# Patient Record
Sex: MALE | Race: WHITE | NOT HISPANIC OR LATINO | Employment: OTHER | URBAN - METROPOLITAN AREA
[De-identification: names, ages, dates, MRNs, and addresses within clinical notes are randomized per-mention and may not be internally consistent; named-entity substitution may affect disease eponyms.]

---

## 2017-01-02 RX ORDER — SIMVASTATIN 40 MG
TABLET ORAL
Qty: 90 TABLET | Refills: 2 | Status: CANCELLED | OUTPATIENT
Start: 2017-01-02

## 2017-01-03 RX ORDER — SIMVASTATIN 40 MG
40 TABLET ORAL NIGHTLY
Qty: 90 TABLET | Refills: 0 | Status: SHIPPED | OUTPATIENT
Start: 2017-01-03 | End: 2017-03-07

## 2017-01-19 DIAGNOSIS — IMO0002 UNCONTROLLED TYPE 2 DIABETES MELLITUS WITH OTHER SPECIFIED COMPLICATION: ICD-10-CM

## 2017-01-19 DIAGNOSIS — R53.82 CHRONIC FATIGUE: ICD-10-CM

## 2017-01-19 DIAGNOSIS — E78.49 OTHER HYPERLIPIDEMIA: ICD-10-CM

## 2017-01-19 DIAGNOSIS — E11.42 DIABETIC PERIPHERAL NEUROPATHY (HCC): ICD-10-CM

## 2017-01-19 DIAGNOSIS — E55.9 VITAMIN D DEFICIENCY: ICD-10-CM

## 2017-01-19 RX ORDER — CALCIUM CITRATE/VITAMIN D3 200MG-6.25
TABLET ORAL
Qty: 100 EACH | Refills: 5 | Status: SHIPPED | OUTPATIENT
Start: 2017-01-19 | End: 2017-05-09 | Stop reason: SDUPTHER

## 2017-03-07 ENCOUNTER — OFFICE VISIT (OUTPATIENT)
Dept: ENDOCRINOLOGY | Age: 77
End: 2017-03-07

## 2017-03-07 VITALS
BODY MASS INDEX: 24.36 KG/M2 | HEIGHT: 66 IN | WEIGHT: 151.6 LBS | DIASTOLIC BLOOD PRESSURE: 74 MMHG | SYSTOLIC BLOOD PRESSURE: 134 MMHG

## 2017-03-07 DIAGNOSIS — E78.49 OTHER HYPERLIPIDEMIA: ICD-10-CM

## 2017-03-07 DIAGNOSIS — IMO0002 UNCONTROLLED TYPE 2 DIABETES MELLITUS WITH OTHER SPECIFIED COMPLICATION: ICD-10-CM

## 2017-03-07 DIAGNOSIS — E11.42 DIABETIC PERIPHERAL NEUROPATHY (HCC): ICD-10-CM

## 2017-03-07 DIAGNOSIS — M79.604 PAIN IN BOTH LOWER EXTREMITIES: ICD-10-CM

## 2017-03-07 DIAGNOSIS — IMO0001 UNCONTROLLED DIABETES MELLITUS TYPE 2 WITHOUT COMPLICATIONS, UNSPECIFIED LONG TERM INSULIN USE STATUS: Primary | ICD-10-CM

## 2017-03-07 DIAGNOSIS — R53.82 CHRONIC FATIGUE: ICD-10-CM

## 2017-03-07 DIAGNOSIS — K59.00 CONSTIPATION, UNSPECIFIED CONSTIPATION TYPE: ICD-10-CM

## 2017-03-07 DIAGNOSIS — E55.9 VITAMIN D DEFICIENCY: ICD-10-CM

## 2017-03-07 DIAGNOSIS — M79.605 PAIN IN BOTH LOWER EXTREMITIES: ICD-10-CM

## 2017-03-07 PROBLEM — M79.606 LEG PAIN: Status: ACTIVE | Noted: 2017-03-07

## 2017-03-07 PROCEDURE — 99214 OFFICE O/P EST MOD 30 MIN: CPT | Performed by: NURSE PRACTITIONER

## 2017-03-07 RX ORDER — INSULIN GLARGINE 100 [IU]/ML
INJECTION, SOLUTION SUBCUTANEOUS
Qty: 15 ML | Refills: 5 | Status: SHIPPED | OUTPATIENT
Start: 2017-03-07 | End: 2017-06-01 | Stop reason: SDUPTHER

## 2017-03-07 RX ORDER — GLUCOSAMINE HCL 500 MG
1000 TABLET ORAL DAILY
COMMUNITY

## 2017-03-07 RX ORDER — LINACLOTIDE 145 UG/1
CAPSULE, GELATIN COATED ORAL
Qty: 30 CAPSULE | Refills: 5 | Status: SHIPPED | OUTPATIENT
Start: 2017-03-07 | End: 2017-08-23

## 2017-03-07 NOTE — PATIENT INSTRUCTIONS
Take linzess 1 tab 30 min prior to food  Discontinue simvastatin to see if leg pain improves. If it does not in several weeks then restart.

## 2017-03-08 ENCOUNTER — OFFICE VISIT (OUTPATIENT)
Dept: FAMILY MEDICINE CLINIC | Facility: CLINIC | Age: 77
End: 2017-03-08

## 2017-03-08 VITALS
RESPIRATION RATE: 16 BRPM | BODY MASS INDEX: 24.27 KG/M2 | HEART RATE: 75 BPM | TEMPERATURE: 97.8 F | OXYGEN SATURATION: 97 % | HEIGHT: 66 IN | SYSTOLIC BLOOD PRESSURE: 130 MMHG | WEIGHT: 151 LBS | DIASTOLIC BLOOD PRESSURE: 70 MMHG

## 2017-03-08 DIAGNOSIS — F41.9 ANXIETY: Primary | ICD-10-CM

## 2017-03-08 LAB
25(OH)D3+25(OH)D2 SERPL-MCNC: 48.3 NG/ML (ref 30–100)
ALBUMIN SERPL-MCNC: 4.4 G/DL (ref 3.5–4.8)
ALBUMIN/GLOB SERPL: 2.2 {RATIO} (ref 1.1–2.5)
ALP SERPL-CCNC: 61 IU/L (ref 39–117)
ALT SERPL-CCNC: 6 IU/L (ref 0–44)
AST SERPL-CCNC: 12 IU/L (ref 0–40)
BILIRUB SERPL-MCNC: <0.2 MG/DL (ref 0–1.2)
BUN SERPL-MCNC: 23 MG/DL (ref 8–27)
BUN/CREAT SERPL: 20 (ref 10–22)
C PEPTIDE SERPL-MCNC: 3 NG/ML (ref 1.1–4.4)
CALCIUM SERPL-MCNC: 9.8 MG/DL (ref 8.6–10.2)
CHLORIDE SERPL-SCNC: 103 MMOL/L (ref 96–106)
CHOLEST SERPL-MCNC: 129 MG/DL (ref 100–199)
CO2 SERPL-SCNC: 23 MMOL/L (ref 18–29)
CREAT SERPL-MCNC: 1.14 MG/DL (ref 0.76–1.27)
FT4I SERPL CALC-MCNC: 2 (ref 1.2–4.9)
GLOBULIN SER CALC-MCNC: 2 G/DL (ref 1.5–4.5)
GLUCOSE SERPL-MCNC: 108 MG/DL (ref 65–99)
HBA1C MFR BLD: 8.1 % (ref 4.8–5.6)
HDLC SERPL-MCNC: 39 MG/DL
LDLC SERPL CALC-MCNC: 47 MG/DL (ref 0–99)
MICROALBUMIN UR-MCNC: <3 UG/ML
POTASSIUM SERPL-SCNC: 5 MMOL/L (ref 3.5–5.2)
PROT SERPL-MCNC: 6.4 G/DL (ref 6–8.5)
SODIUM SERPL-SCNC: 143 MMOL/L (ref 134–144)
T3FREE SERPL-MCNC: 2.3 PG/ML (ref 2–4.4)
T3RU NFR SERPL: 31 % (ref 24–39)
T4 FREE SERPL-MCNC: 1.12 NG/DL (ref 0.82–1.77)
T4 SERPL-MCNC: 6.3 UG/DL (ref 4.5–12)
TRIGL SERPL-MCNC: 213 MG/DL (ref 0–149)
TSH SERPL DL<=0.005 MIU/L-ACNC: 1.78 UIU/ML (ref 0.45–4.5)
VLDLC SERPL CALC-MCNC: 43 MG/DL (ref 5–40)

## 2017-03-08 PROCEDURE — 99212 OFFICE O/P EST SF 10 MIN: CPT | Performed by: PHYSICIAN ASSISTANT

## 2017-03-08 NOTE — PATIENT INSTRUCTIONS
Valium is a controlled substance.  I only want you to take it as needed.  I do not want you to take it routinely.  Use the lowest effective dose.  It can be habit forming.  It can make you feel drowsy.   This could effect how you operate machinery, including a car.  Caution is advised.  I would avoid taking it and then driving.  Do not take this with alcohol.      Need to consider CT chest low dose for screening

## 2017-03-08 NOTE — PROGRESS NOTES
Subjective   Courtney Medrano is a 76 y.o. male.     History of Present Illness   Courtney Medrano male 76 y.o. who presents today for follow up of Anxiety and is situational and not daily.  He reports medication is working well, patient desires to continue on Rx, and needs refill. Onset of symptoms was approximately several years ago.  He denies current suicidal and homicidal ideation. Risk factors are lifestyle of multiple roles.  Previous treatment includes current Rx.  He complains of the following medication side effects: none.  The patient declines to go to counseling..      I agree with Asia CORTEZ that he needs to stop Simvastatin for the leg cramps    Patient comes in today to see me for the first time. Is a long term patient of Dr. Marcos. Seeing me today due to Dr. Marcos no longer working here. Discussed with pt today that I will not be writing pain medications or benzodiazepines for them past a 90 day window and that we will be aggressively decreasing doses along the way. They have a choice of being sent to pain management for any narcotics taken, if they wish. I will be getting them off of any benzos.they are taking, or they can see a psychiatrist to discuss this further (list of names and phone numbers given to pt today). They are also aware that they are free to transfer their care to a different facility before the 90 days are up if they wish. After that point, they are aware that they will no longer be seen in this facility by our doctors. Again, reiterated the fact that those types of medications will not be written here past 90 days, and pt voices understanding.    The following portions of the patient's history were reviewed and updated as appropriate: allergies, current medications, past family history, past medical history, past social history, past surgical history and problem list.    Review of Systems   Constitutional: Positive for fatigue. Negative for activity change, appetite change and  unexpected weight change.   HENT: Negative for nosebleeds and trouble swallowing.    Eyes: Negative for pain and visual disturbance.   Respiratory: Negative for chest tightness, shortness of breath and wheezing.    Cardiovascular: Negative for chest pain and palpitations.   Gastrointestinal: Negative for abdominal pain and blood in stool.   Endocrine: Negative.    Genitourinary: Negative for difficulty urinating and hematuria.   Musculoskeletal: Negative for joint swelling.   Skin: Negative for color change and rash.   Allergic/Immunologic: Negative.    Neurological: Negative for syncope and speech difficulty.   Hematological: Negative for adenopathy.   Psychiatric/Behavioral: Negative for agitation and confusion. The patient is nervous/anxious.    All other systems reviewed and are negative.      Objective   Physical Exam   Constitutional: He is oriented to person, place, and time. He appears well-developed and well-nourished. No distress.   Ambulates with cane     HENT:   Head: Normocephalic.   Eyes: Conjunctivae and EOM are normal. Pupils are equal, round, and reactive to light.   Neck: Normal range of motion. Neck supple.   Cardiovascular: Normal rate, regular rhythm and normal heart sounds.    No murmur heard.  Pulmonary/Chest: Effort normal and breath sounds normal.   Musculoskeletal: Normal range of motion.   Neurological: He is alert and oriented to person, place, and time.   Skin: Skin is warm and dry. No rash noted. He is not diaphoretic.   Psychiatric: His behavior is normal. Judgment and thought content normal. His affect is not inappropriate. He is not actively hallucinating. Cognition and memory are normal.   Nursing note and vitals reviewed.      Assessment/Plan   Problems Addressed this Visit     None      Visit Diagnoses     Anxiety    -  Primary                I have reviewed the notes, assessments, and/or procedures performed by Shantell Alvarez PA-C, I concur with her/his documentation of Courtney RIVERA  Mitchell.  I have reviewed the notes, assessments, and/or procedures performed by Shantell Alvarez PA-C, I concur with her/his documentation of Courtney Medrano.

## 2017-03-09 ENCOUNTER — TELEPHONE (OUTPATIENT)
Dept: ENDOCRINOLOGY | Age: 77
End: 2017-03-09

## 2017-03-09 RX ORDER — ICOSAPENT ETHYL 1000 MG/1
2 CAPSULE ORAL 2 TIMES DAILY WITH MEALS
Qty: 120 CAPSULE | Refills: 5 | Status: SHIPPED | OUTPATIENT
Start: 2017-03-09 | End: 2017-08-28

## 2017-03-09 RX ORDER — GLIMEPIRIDE 4 MG/1
TABLET ORAL
Qty: 60 TABLET | Refills: 5 | Status: SHIPPED | OUTPATIENT
Start: 2017-03-09 | End: 2017-07-11 | Stop reason: SDUPTHER

## 2017-03-11 RX ORDER — DIAZEPAM 5 MG/1
5 TABLET ORAL DAILY
Qty: 30 TABLET | Refills: 2
Start: 2017-03-11 | End: 2017-08-23

## 2017-05-09 DIAGNOSIS — R53.82 CHRONIC FATIGUE: ICD-10-CM

## 2017-05-09 DIAGNOSIS — E11.42 DIABETIC PERIPHERAL NEUROPATHY (HCC): ICD-10-CM

## 2017-05-09 DIAGNOSIS — IMO0002 UNCONTROLLED TYPE 2 DIABETES MELLITUS WITH OTHER SPECIFIED COMPLICATION: ICD-10-CM

## 2017-05-09 DIAGNOSIS — E55.9 VITAMIN D DEFICIENCY: ICD-10-CM

## 2017-05-09 DIAGNOSIS — E78.49 OTHER HYPERLIPIDEMIA: ICD-10-CM

## 2017-05-31 DIAGNOSIS — E11.42 DIABETIC PERIPHERAL NEUROPATHY (HCC): ICD-10-CM

## 2017-05-31 DIAGNOSIS — R53.82 CHRONIC FATIGUE: ICD-10-CM

## 2017-05-31 DIAGNOSIS — IMO0002 UNCONTROLLED TYPE 2 DIABETES MELLITUS WITH OTHER SPECIFIED COMPLICATION: ICD-10-CM

## 2017-05-31 DIAGNOSIS — E78.49 OTHER HYPERLIPIDEMIA: ICD-10-CM

## 2017-05-31 DIAGNOSIS — K59.00 CONSTIPATION, UNSPECIFIED CONSTIPATION TYPE: ICD-10-CM

## 2017-05-31 DIAGNOSIS — IMO0001 UNCONTROLLED DIABETES MELLITUS TYPE 2 WITHOUT COMPLICATIONS, UNSPECIFIED LONG TERM INSULIN USE STATUS: ICD-10-CM

## 2017-05-31 DIAGNOSIS — E55.9 VITAMIN D DEFICIENCY: ICD-10-CM

## 2017-05-31 RX ORDER — INSULIN GLARGINE 100 [IU]/ML
INJECTION, SOLUTION SUBCUTANEOUS
Qty: 15 ML | Refills: 5 | Status: CANCELLED | OUTPATIENT
Start: 2017-05-31

## 2017-05-31 RX ORDER — PEN NEEDLE, DIABETIC 32GX 5/32"
NEEDLE, DISPOSABLE MISCELLANEOUS
Refills: 5 | Status: CANCELLED | OUTPATIENT
Start: 2017-05-31

## 2017-06-01 ENCOUNTER — TELEPHONE (OUTPATIENT)
Dept: ENDOCRINOLOGY | Age: 77
End: 2017-06-01

## 2017-06-01 DIAGNOSIS — IMO0001 UNCONTROLLED DIABETES MELLITUS TYPE 2 WITHOUT COMPLICATIONS, UNSPECIFIED LONG TERM INSULIN USE STATUS: ICD-10-CM

## 2017-06-01 DIAGNOSIS — R53.82 CHRONIC FATIGUE: ICD-10-CM

## 2017-06-01 DIAGNOSIS — IMO0002 UNCONTROLLED TYPE 2 DIABETES MELLITUS WITH OTHER SPECIFIED COMPLICATION: ICD-10-CM

## 2017-06-01 DIAGNOSIS — E11.42 DIABETIC PERIPHERAL NEUROPATHY (HCC): ICD-10-CM

## 2017-06-01 DIAGNOSIS — E55.9 VITAMIN D DEFICIENCY: ICD-10-CM

## 2017-06-01 DIAGNOSIS — K59.00 CONSTIPATION, UNSPECIFIED CONSTIPATION TYPE: ICD-10-CM

## 2017-06-01 DIAGNOSIS — E78.49 OTHER HYPERLIPIDEMIA: ICD-10-CM

## 2017-06-01 RX ORDER — INSULIN GLARGINE 100 [IU]/ML
INJECTION, SOLUTION SUBCUTANEOUS
Qty: 45 ML | Refills: 0 | Status: SHIPPED | OUTPATIENT
Start: 2017-06-01 | End: 2017-08-23

## 2017-06-01 NOTE — TELEPHONE ENCOUNTER
----- Message from RUSSELL Seth sent at 6/1/2017  4:33 PM EDT -----  Contact: patient   Go to er  ----- Message -----     From: Vera Babin MA     Sent: 6/1/2017   3:38 PM       To: RUSSELL Seth     Please advise.   ----- Message -----     From: Shantell Valente     Sent: 6/1/2017   3:25 PM       To: Vera Babin MA    Very fatigue  Soreness in legs the muscles in the legs  Pain is horrific   Can barely stand up    Would like a shot of something to get rid of the pain      Took zocor  For cholesterol          I spoke with patient and stated that he should go to the ER. He expressed understanding.

## 2017-06-02 ENCOUNTER — APPOINTMENT (OUTPATIENT)
Dept: CARDIOLOGY | Facility: HOSPITAL | Age: 77
End: 2017-06-02

## 2017-06-02 ENCOUNTER — HOSPITAL ENCOUNTER (EMERGENCY)
Facility: HOSPITAL | Age: 77
Discharge: LEFT BEFORE DISCHARGE | End: 2017-06-02

## 2017-06-02 VITALS
BODY MASS INDEX: 24.11 KG/M2 | RESPIRATION RATE: 15 BRPM | SYSTOLIC BLOOD PRESSURE: 132 MMHG | TEMPERATURE: 96.7 F | HEART RATE: 68 BPM | DIASTOLIC BLOOD PRESSURE: 67 MMHG | OXYGEN SATURATION: 95 % | WEIGHT: 150 LBS | HEIGHT: 66 IN

## 2017-06-02 LAB
ALBUMIN SERPL-MCNC: 4.3 G/DL (ref 3.5–5.2)
ALBUMIN/GLOB SERPL: 1.7 G/DL
ALP SERPL-CCNC: 59 U/L (ref 39–117)
ALT SERPL W P-5'-P-CCNC: 7 U/L (ref 1–41)
ANION GAP SERPL CALCULATED.3IONS-SCNC: 12.2 MMOL/L
AST SERPL-CCNC: 15 U/L (ref 1–40)
BASOPHILS # BLD AUTO: 0.04 10*3/MM3 (ref 0–0.2)
BASOPHILS NFR BLD AUTO: 0.5 % (ref 0–1.5)
BILIRUB SERPL-MCNC: 0.2 MG/DL (ref 0.1–1.2)
BUN BLD-MCNC: 28 MG/DL (ref 8–23)
BUN/CREAT SERPL: 20.7 (ref 7–25)
CALCIUM SPEC-SCNC: 11.9 MG/DL (ref 8.6–10.5)
CHLORIDE SERPL-SCNC: 103 MMOL/L (ref 98–107)
CO2 SERPL-SCNC: 25.8 MMOL/L (ref 22–29)
CREAT BLD-MCNC: 1.35 MG/DL (ref 0.76–1.27)
DEPRECATED RDW RBC AUTO: 46.4 FL (ref 37–54)
EOSINOPHIL # BLD AUTO: 0.09 10*3/MM3 (ref 0–0.7)
EOSINOPHIL NFR BLD AUTO: 1 % (ref 0.3–6.2)
ERYTHROCYTE [DISTWIDTH] IN BLOOD BY AUTOMATED COUNT: 13.7 % (ref 11.5–14.5)
GFR SERPL CREATININE-BSD FRML MDRD: 52 ML/MIN/1.73
GLOBULIN UR ELPH-MCNC: 2.6 GM/DL
GLUCOSE BLD-MCNC: 149 MG/DL (ref 65–99)
HCT VFR BLD AUTO: 37.8 % (ref 40.4–52.2)
HGB BLD-MCNC: 12.6 G/DL (ref 13.7–17.6)
HOLD SPECIMEN: NORMAL
IMM GRANULOCYTES # BLD: 0 10*3/MM3 (ref 0–0.03)
IMM GRANULOCYTES NFR BLD: 0 % (ref 0–0.5)
LYMPHOCYTES # BLD AUTO: 2.43 10*3/MM3 (ref 0.9–4.8)
LYMPHOCYTES NFR BLD AUTO: 27.6 % (ref 19.6–45.3)
MCH RBC QN AUTO: 31 PG (ref 27–32.7)
MCHC RBC AUTO-ENTMCNC: 33.3 G/DL (ref 32.6–36.4)
MCV RBC AUTO: 92.9 FL (ref 79.8–96.2)
MONOCYTES # BLD AUTO: 0.63 10*3/MM3 (ref 0.2–1.2)
MONOCYTES NFR BLD AUTO: 7.2 % (ref 5–12)
NEUTROPHILS # BLD AUTO: 5.62 10*3/MM3 (ref 1.9–8.1)
NEUTROPHILS NFR BLD AUTO: 63.7 % (ref 42.7–76)
PLATELET # BLD AUTO: 195 10*3/MM3 (ref 140–500)
PMV BLD AUTO: 10.6 FL (ref 6–12)
POTASSIUM BLD-SCNC: 4.9 MMOL/L (ref 3.5–5.2)
PROT SERPL-MCNC: 6.9 G/DL (ref 6–8.5)
RBC # BLD AUTO: 4.07 10*6/MM3 (ref 4.6–6)
SODIUM BLD-SCNC: 141 MMOL/L (ref 136–145)
WBC NRBC COR # BLD: 8.81 10*3/MM3 (ref 4.5–10.7)
WHOLE BLOOD HOLD SPECIMEN: NORMAL

## 2017-06-02 PROCEDURE — 85025 COMPLETE CBC W/AUTO DIFF WBC: CPT | Performed by: NURSE PRACTITIONER

## 2017-06-02 PROCEDURE — 80053 COMPREHEN METABOLIC PANEL: CPT | Performed by: NURSE PRACTITIONER

## 2017-06-02 PROCEDURE — 93970 EXTREMITY STUDY: CPT

## 2017-06-02 PROCEDURE — 36415 COLL VENOUS BLD VENIPUNCTURE: CPT | Performed by: NURSE PRACTITIONER

## 2017-06-02 PROCEDURE — 99211 OFF/OP EST MAY X REQ PHY/QHP: CPT

## 2017-06-02 NOTE — ED NOTES
Pt here for bilat leg pain that is worse w walking. Discomfort better once sitting. No new swelling/weakness/discoloration. Reports talking about a neuropathy dx w pcp. Was dx w dm 8 years ago.      Sanna Fraser RN  06/02/17 0275

## 2017-06-02 NOTE — PROGRESS NOTES
Vascular lab bilateral lower extremity venous doppler complete, prelim negative DVT - Ronda, APRN aware

## 2017-06-03 LAB
BH CV LOW VAS LEFT POPLITEAL SPONT: 1
BH CV LOWER VASCULAR LEFT COMMON FEMORAL AUGMENT: NORMAL
BH CV LOWER VASCULAR LEFT COMMON FEMORAL COMPETENT: NORMAL
BH CV LOWER VASCULAR LEFT COMMON FEMORAL COMPRESS: NORMAL
BH CV LOWER VASCULAR LEFT COMMON FEMORAL PHASIC: NORMAL
BH CV LOWER VASCULAR LEFT COMMON FEMORAL SPONT: NORMAL
BH CV LOWER VASCULAR LEFT DISTAL FEMORAL COMPRESS: NORMAL
BH CV LOWER VASCULAR LEFT GASTRONEMIUS COMPRESS: NORMAL
BH CV LOWER VASCULAR LEFT GREATER SAPH AK COMPRESS: NORMAL
BH CV LOWER VASCULAR LEFT GREATER SAPH BK COMPRESS: NORMAL
BH CV LOWER VASCULAR LEFT LESSER SAPH COMPRESS: NORMAL
BH CV LOWER VASCULAR LEFT MID FEMORAL AUGMENT: NORMAL
BH CV LOWER VASCULAR LEFT MID FEMORAL COMPETENT: NORMAL
BH CV LOWER VASCULAR LEFT MID FEMORAL COMPRESS: NORMAL
BH CV LOWER VASCULAR LEFT MID FEMORAL PHASIC: NORMAL
BH CV LOWER VASCULAR LEFT MID FEMORAL SPONT: NORMAL
BH CV LOWER VASCULAR LEFT PERONEAL COMPRESS: NORMAL
BH CV LOWER VASCULAR LEFT POPLITEAL AUGMENT: NORMAL
BH CV LOWER VASCULAR LEFT POPLITEAL COMPETENT: NORMAL
BH CV LOWER VASCULAR LEFT POPLITEAL COMPRESS: NORMAL
BH CV LOWER VASCULAR LEFT POPLITEAL PHASIC: NORMAL
BH CV LOWER VASCULAR LEFT POPLITEAL SPONT: NORMAL
BH CV LOWER VASCULAR LEFT POSTERIOR TIBIAL COMPRESS: NORMAL
BH CV LOWER VASCULAR LEFT PROXIMAL FEMORAL COMPRESS: NORMAL
BH CV LOWER VASCULAR LEFT SAPHENOFEMORAL JUNCTION AUGMENT: NORMAL
BH CV LOWER VASCULAR LEFT SAPHENOFEMORAL JUNCTION COMPETENT: NORMAL
BH CV LOWER VASCULAR LEFT SAPHENOFEMORAL JUNCTION COMPRESS: NORMAL
BH CV LOWER VASCULAR LEFT SAPHENOFEMORAL JUNCTION PHASIC: NORMAL
BH CV LOWER VASCULAR LEFT SAPHENOFEMORAL JUNCTION SPONT: NORMAL
BH CV LOWER VASCULAR RIGHT COMMON FEMORAL AUGMENT: NORMAL
BH CV LOWER VASCULAR RIGHT COMMON FEMORAL COMPETENT: NORMAL
BH CV LOWER VASCULAR RIGHT COMMON FEMORAL COMPRESS: NORMAL
BH CV LOWER VASCULAR RIGHT COMMON FEMORAL PHASIC: NORMAL
BH CV LOWER VASCULAR RIGHT COMMON FEMORAL SPONT: NORMAL
BH CV LOWER VASCULAR RIGHT DISTAL FEMORAL COMPRESS: NORMAL
BH CV LOWER VASCULAR RIGHT GASTRONEMIUS COMPRESS: NORMAL
BH CV LOWER VASCULAR RIGHT GREATER SAPH AK COMPRESS: NORMAL
BH CV LOWER VASCULAR RIGHT GREATER SAPH BK COMPRESS: NORMAL
BH CV LOWER VASCULAR RIGHT LESSER SAPH COMPRESS: NORMAL
BH CV LOWER VASCULAR RIGHT MID FEMORAL AUGMENT: NORMAL
BH CV LOWER VASCULAR RIGHT MID FEMORAL COMPETENT: NORMAL
BH CV LOWER VASCULAR RIGHT MID FEMORAL COMPRESS: NORMAL
BH CV LOWER VASCULAR RIGHT MID FEMORAL PHASIC: NORMAL
BH CV LOWER VASCULAR RIGHT MID FEMORAL SPONT: NORMAL
BH CV LOWER VASCULAR RIGHT PERONEAL COMPRESS: NORMAL
BH CV LOWER VASCULAR RIGHT POPLITEAL AUGMENT: NORMAL
BH CV LOWER VASCULAR RIGHT POPLITEAL COMPETENT: NORMAL
BH CV LOWER VASCULAR RIGHT POPLITEAL COMPRESS: NORMAL
BH CV LOWER VASCULAR RIGHT POPLITEAL PHASIC: NORMAL
BH CV LOWER VASCULAR RIGHT POPLITEAL SPONT: NORMAL
BH CV LOWER VASCULAR RIGHT POSTERIOR TIBIAL COMPRESS: NORMAL
BH CV LOWER VASCULAR RIGHT PROXIMAL FEMORAL COMPRESS: NORMAL
BH CV LOWER VASCULAR RIGHT SAPHENOFEMORAL JUNCTION AUGMENT: NORMAL
BH CV LOWER VASCULAR RIGHT SAPHENOFEMORAL JUNCTION COMPETENT: NORMAL
BH CV LOWER VASCULAR RIGHT SAPHENOFEMORAL JUNCTION COMPRESS: NORMAL
BH CV LOWER VASCULAR RIGHT SAPHENOFEMORAL JUNCTION PHASIC: NORMAL
BH CV LOWER VASCULAR RIGHT SAPHENOFEMORAL JUNCTION SPONT: NORMAL

## 2017-06-15 ENCOUNTER — OFFICE VISIT (OUTPATIENT)
Dept: FAMILY MEDICINE CLINIC | Facility: CLINIC | Age: 77
End: 2017-06-15

## 2017-06-15 VITALS
RESPIRATION RATE: 16 BRPM | HEART RATE: 73 BPM | HEIGHT: 66 IN | SYSTOLIC BLOOD PRESSURE: 130 MMHG | TEMPERATURE: 98.6 F | OXYGEN SATURATION: 94 % | BODY MASS INDEX: 24.27 KG/M2 | WEIGHT: 151 LBS | DIASTOLIC BLOOD PRESSURE: 64 MMHG

## 2017-06-15 DIAGNOSIS — Z86.39 HISTORY OF DIABETES MELLITUS: ICD-10-CM

## 2017-06-15 DIAGNOSIS — I73.9 INTERMITTENT CLAUDICATION (HCC): Primary | ICD-10-CM

## 2017-06-15 PROCEDURE — 99213 OFFICE O/P EST LOW 20 MIN: CPT | Performed by: PHYSICIAN ASSISTANT

## 2017-06-15 NOTE — PATIENT INSTRUCTIONS
Refer to vascular doc for work up  Stop smoking  See Dr Hilton  Peripheral Vascular Disease  Peripheral vascular disease (PVD) is a disease of the blood vessels that are not part of your heart and brain. A simple term for PVD is poor circulation. In most cases, PVD narrows the blood vessels that carry blood from your heart to the rest of your body. This can result in a decreased supply of blood to your arms, legs, and internal organs, like your stomach or kidneys. However, it most often affects a person's lower legs and feet.  There are two types of PVD.  · Organic PVD. This is the more common type. It is caused by damage to the structure of blood vessels.  · Functional PVD. This is caused by conditions that make blood vessels contract and tighten (spasm).  Without treatment, PVD tends to get worse over time.  PVD can also lead to acute ischemic limb. This is when an arm or limb suddenly has trouble getting enough blood. This is a medical emergency.  CAUSES  Each type of PVD has many different causes. The most common cause of PVD is buildup of a fatty material (plaque) inside of your arteries (atherosclerosis). Small amounts of plaque can break off from the walls of the blood vessels and become lodged in a smaller artery. This blocks blood flow and can cause acute ischemic limb.  Other common causes of PVD include:  · Blood clots that form inside of blood vessels.  · Injuries to blood vessels.  · Diseases that cause inflammation of blood vessels or cause blood vessel spasms.  · Health behaviors and health history that increase your risk of developing PVD.  RISK FACTORS   You may have a greater risk of PVD if you:  · Have a family history of PVD.  · Have certain medical conditions, including:    High cholesterol.    Diabetes.    High blood pressure (hypertension).    Coronary heart disease.    Past problems with blood clots.    Past injury, such as burns or a broken bone. These may have damaged blood vessels in  your limbs.    Buerger disease. This is caused by inflamed blood vessels in your hands and feet.    Some forms of arthritis.    Rare birth defects that affect the arteries in your legs.  · Use tobacco.  · Do not get enough exercise.  · Are obese.  · Are age 50 or older.  SIGNS AND SYMPTOMS   PVD may cause many different symptoms. Your symptoms depend on what part of your body is not getting enough blood. Some common signs and symptoms include:  · Cramps in your lower legs. This may be a symptom of poor leg circulation (claudication).  · Pain and weakness in your legs while you are physically active that goes away when you rest (intermittent claudication).  · Leg pain when at rest.  · Leg numbness, tingling, or weakness.  · Coldness in a leg or foot, especially when compared with the other leg.  · Skin or hair changes. These can include:    Hair loss.    Shiny skin.    Pale or bluish skin.    Thick toenails.  · Inability to get or maintain an erection (erectile dysfunction).  People with PVD are more prone to developing ulcers and sores on their toes, feet, or legs. These may take longer than normal to heal.  DIAGNOSIS  Your health care provider may diagnose PVD from your signs and symptoms. The health care provider will also do a physical exam. You may have tests to find out what is causing your PVD and determine its severity. Tests may include:  · Blood pressure recordings from your arms and legs and measurements of the strength of your pulses (pulse volume recordings).  · Imaging studies using sound waves to take pictures of the blood flow through your blood vessels (Doppler ultrasound).  · Injecting a dye into your blood vessels before having imaging studies using:    X-rays (angiogram or arteriogram).    Computer-generated X-rays (CT angiogram).    A powerful electromagnetic field and a computer (magnetic resonance angiogram or MRA).  TREATMENT  Treatment for PVD depends on the cause of your condition and the  severity of your symptoms. It also depends on your age. Underlying causes need to be treated and controlled. These include long-lasting (chronic) conditions, such as diabetes, high cholesterol, and high blood pressure. You may need to first try making lifestyle changes and taking medicines. Surgery may be needed if these do not work.  Lifestyle changes may include:  · Quitting smoking.  · Exercising regularly.  · Following a low-fat, low-cholesterol diet.  Medicines may include:  · Blood thinners to prevent blood clots.  · Medicines to improve blood flow.  · Medicines to improve your blood cholesterol levels.  Surgical procedures may include:  · A procedure that uses an inflated balloon to open a blocked artery and improve blood flow (angioplasty).  · A procedure to put in a tube (stent) to keep a blocked artery open (stent implant).  · Surgery to reroute blood flow around a blocked artery (peripheral bypass surgery).  · Surgery to remove dead tissue from an infected wound on the affected limb.  · Amputation. This is surgical removal of the affected limb. This may be necessary in cases of acute ischemic limb that are not improved through medical or surgical treatments.  HOME CARE INSTRUCTIONS  · Take medicines only as directed by your health care provider.  · Do not use any tobacco products, including cigarettes, chewing tobacco, or electronic cigarettes.  If you need help quitting, ask your health care provider.  · Lose weight if you are overweight, and maintain a healthy weight as directed by your health care provider.  · Eat a diet that is low in fat and cholesterol. If you need help, ask your health care provider.  · Exercise regularly. Ask your health care provider to suggest some good activities for you.  · Use compression stockings or other mechanical devices as directed by your health care provider.  · Take good care of your feet.    Wear comfortable shoes that fit well.    Check your feet often for any cuts  or sores.  SEEK MEDICAL CARE IF:  · You have cramps in your legs while walking.  · You have leg pain when you are at rest.  · You have coldness in a leg or foot.  · Your skin changes.  · You have erectile dysfunction.  · You have cuts or sores on your feet that are not healing.  SEEK IMMEDIATE MEDICAL CARE IF:  · Your arm or leg turns cold and blue.  · Your arms or legs become red, warm, swollen, painful, or numb.  · You have chest pain or trouble breathing.  · You suddenly have weakness in your face, arm, or leg.  · You become very confused or lose the ability to speak.  · You suddenly have a very bad headache or lose your vision.     This information is not intended to replace advice given to you by your health care provider. Make sure you discuss any questions you have with your health care provider.     Document Released: 01/25/2006 Document Revised: 01/08/2016 Document Reviewed: 05/28/2015  [employer] Interactive Patient Education ©2017 [employer]    Intermittent Claudication  Intermittent claudication is pain in your leg that occurs when you walk or exercise and goes away when you rest. The pain can occur in one or both legs.  CAUSES  Intermittent claudication is caused by the buildup of plaque within the major arteries in the body (atherosclerosis). The plaque, which makes arteries stiff and narrow, prevents enough blood from reaching your leg muscles. The pain occurs when you walk or exercise because your muscles need more blood when you are moving and exercising.  RISK FACTORS  Risk factors include:  · A family history of atherosclerosis.  · A personal history of stroke or heart disease.  · Older age.  · Being inactive or overweight.  · Smoking cigarettes.  · Having another health condition such as:    Diabetes.    High blood pressure.    High cholesterol.  SIGNS AND SYMPTOMS   Your hip or leg may:   · Ache.  · Cramp.  · Feel tight.  · Feel weak.  · Feel heavy.  Over time, you may feel pain in your calf,  thigh, or hip.  DIAGNOSIS   Your health care provider may diagnose intermittent claudication based on your symptoms and medical history. Your health care provider may also do tests to learn more about your condition. These may include:  · Blood tests.  · An ultrasound.  · Imaging tests such as angiography, magnetic resonance angiography (MRA), and computed tomography angiography (CTA).  TREATMENT  You may be treated for problems such as:  · High blood pressure.  · High cholesterol.  · Diabetes.  Other treatments may include:  · Lifestyle changes such as:    Starting an exercise program.    Losing weight.    Quitting smoking.  · Medicines to help restore blood flow through your legs.  · Blood vessel surgery (angioplasty) to restore blood flow if your intermittent claudication is caused by severe peripheral artery disease.  HOME CARE INSTRUCTIONS  · Manage any other health conditions you have.  · Eat a diet low in saturated fats and calories to maintain a healthy weight.  · Quit smoking, if you smoke.  · Take medicines only as directed by your health care provider.  · If your health care provider recommended an exercise program for you, follow it as directed. Your exercise program may involve:    Walking three or more times a week.    Walking until you have certain symptoms of intermittent claudication.    Resting until symptoms go away.    Gradually increasing walking time to about 50 minutes a day.  SEEK MEDICAL CARE IF:  Your condition is not getting better or is getting worse.  SEEK IMMEDIATE MEDICAL CARE IF:   · You have chest pain.  · You have difficulty breathing.  · You develop arm weakness.  · You have trouble speaking.  · Your face begins to droop.  MAKE SURE YOU:  · Understand these instructions.  · Will watch your condition.  · Will get help if you are not doing well or get worse.     This information is not intended to replace advice given to you by your health care provider. Make sure you discuss any  questions you have with your health care provider.     Document Released: 10/20/2005 Document Revised: 01/08/2016 Document Reviewed: 03/26/2015  [employer] Interactive Patient Education ©2017 [employer]    Need vascular work up soon  Stop smoking  ER if worse

## 2017-06-15 NOTE — PROGRESS NOTES
Subjective   Courtney Medrano is a 77 y.o. male.     History of Present Illness     Courtney Medrano 77 y.o. male who presents today for leg pain  he has a history of   Patient Active Problem List   Diagnosis   • Anxiety disorder   • Depression   • Constipation   • Diabetes type 2, uncontrolled   • GERD (gastroesophageal reflux disease)   • Hyperlipidemia   • Low back pain   • Prostatitis   • Pulmonary nodule   • Diabetic peripheral neuropathy   • Chronic fatigue   • Vitamin D deficiency   • Noncompliance of patient with dietary regimen   • Uncontrolled type 2 diabetes mellitus   • Constipation   • Leg pain   .    He had gone to ER 6-2-17 with leg pain and was to have doppler studies and arterial doppler and left prior to getting it done    He has DMII and sees Dr Hilton.  He has smoked for 60 years    Has to stop after walking 25 feet;  This is for a year and getting worse.  Pain is anterior and posterior tib.fib; into calf area;  It is ONLY with activity and about 25 feet; rests and then can walk another 25 feet;  Very hard to go out.  No pain waking him up in legs;  No pain with rest, sitting    He is on ASA  This is very concerning d/t his DMII on insulin and smoking 60 years.  This may already be PAD.  The following portions of the patient's history were reviewed and updated as appropriate: allergies, current medications, past family history, past medical history, past social history, past surgical history and problem list.    Review of Systems   Constitutional: Positive for fatigue. Negative for activity change, appetite change and unexpected weight change.   HENT: Negative for nosebleeds and trouble swallowing.    Eyes: Negative for pain and visual disturbance.   Respiratory: Negative for chest tightness, shortness of breath and wheezing.    Cardiovascular: Negative for chest pain and palpitations.   Gastrointestinal: Negative for abdominal pain and blood in stool.   Endocrine: Negative.    Genitourinary:  Negative for difficulty urinating and hematuria.   Musculoskeletal: Positive for arthralgias, gait problem and neck pain. Negative for joint swelling.   Skin: Negative for color change and rash.   Allergic/Immunologic: Negative.    Neurological: Negative for syncope and speech difficulty.   Hematological: Negative for adenopathy.   Psychiatric/Behavioral: Negative for agitation and confusion. The patient is nervous/anxious.    All other systems reviewed and are negative.      Objective   Physical Exam   Constitutional: He is oriented to person, place, and time. He appears well-developed and well-nourished. No distress.   HENT:   Head: Normocephalic and atraumatic.   Eyes: Conjunctivae and EOM are normal. Pupils are equal, round, and reactive to light. Right eye exhibits no discharge. Left eye exhibits no discharge. No scleral icterus.   Neck: Normal range of motion. Neck supple. No tracheal deviation present. No thyromegaly present.   Cardiovascular: Normal rate, regular rhythm, normal heart sounds, intact distal pulses and normal pulses.  Exam reveals no gallop.    No murmur heard.  I took off his socks and his lower ext are pale; no hair on left great toe and one strand right great toe;  Dorsalis pedis pulses 1+=;  Posterior tibial are 1/2-1+ bilat  Some dependent rubor medial plantar right foot;  Legs are not cold but cool to touch;  Skin is pink and vascularized;  No pain to touch  Popliteal pulses 1+=   Pulmonary/Chest: Effort normal and breath sounds normal. No respiratory distress. He has no wheezes. He has no rales.   Musculoskeletal: Normal range of motion.   Neurological: He is alert and oriented to person, place, and time. He exhibits normal muscle tone. Coordination normal.   Skin: Skin is warm. No rash noted. No erythema. No pallor.   Psychiatric: He has a normal mood and affect. His behavior is normal. Judgment and thought content normal.   Nursing note and vitals reviewed.      Assessment/Plan    Problems Addressed this Visit     None      Visit Diagnoses     Intermittent claudication    -  Primary    Relevant Orders    Ambulatory Referral to Vascular Surgery (Completed)    History of diabetes mellitus        Relevant Orders    Ambulatory Referral to Vascular Surgery (Completed)

## 2017-07-10 RX ORDER — GLIMEPIRIDE 4 MG/1
TABLET ORAL
Qty: 180 TABLET | Refills: 5 | Status: CANCELLED | OUTPATIENT
Start: 2017-07-10

## 2017-07-11 RX ORDER — GLIMEPIRIDE 4 MG/1
TABLET ORAL
Qty: 180 TABLET | Refills: 0 | Status: SHIPPED | OUTPATIENT
Start: 2017-07-11 | End: 2017-10-10 | Stop reason: SDUPTHER

## 2017-07-11 RX ORDER — SIMVASTATIN 40 MG
TABLET ORAL
Qty: 90 TABLET | Refills: 0 | OUTPATIENT
Start: 2017-07-11

## 2017-07-11 RX ORDER — SIMVASTATIN 40 MG
40 TABLET ORAL NIGHTLY
Qty: 90 TABLET | Refills: 0 | Status: SHIPPED | OUTPATIENT
Start: 2017-07-11 | End: 2017-10-10 | Stop reason: SDUPTHER

## 2017-07-13 RX ORDER — DIAZEPAM 5 MG/1
TABLET ORAL
Qty: 30 TABLET | Refills: 0 | OUTPATIENT
Start: 2017-07-13

## 2017-07-20 DIAGNOSIS — E78.49 OTHER HYPERLIPIDEMIA: ICD-10-CM

## 2017-07-20 DIAGNOSIS — E11.42 DIABETIC PERIPHERAL NEUROPATHY (HCC): ICD-10-CM

## 2017-07-20 DIAGNOSIS — E55.9 VITAMIN D DEFICIENCY: ICD-10-CM

## 2017-07-20 DIAGNOSIS — IMO0002 UNCONTROLLED TYPE 2 DIABETES MELLITUS WITH OTHER SPECIFIED COMPLICATION: ICD-10-CM

## 2017-07-20 DIAGNOSIS — R53.82 CHRONIC FATIGUE: ICD-10-CM

## 2017-07-20 RX ORDER — CALCIUM CITRATE/VITAMIN D3 200MG-6.25
TABLET ORAL
Refills: 0 | Status: CANCELLED | OUTPATIENT
Start: 2017-07-20

## 2017-07-21 DIAGNOSIS — E78.49 OTHER HYPERLIPIDEMIA: ICD-10-CM

## 2017-07-21 DIAGNOSIS — IMO0002 UNCONTROLLED TYPE 2 DIABETES MELLITUS WITH OTHER SPECIFIED COMPLICATION: ICD-10-CM

## 2017-07-21 DIAGNOSIS — R53.82 CHRONIC FATIGUE: ICD-10-CM

## 2017-07-21 DIAGNOSIS — E55.9 VITAMIN D DEFICIENCY: ICD-10-CM

## 2017-07-21 DIAGNOSIS — E11.42 DIABETIC PERIPHERAL NEUROPATHY (HCC): ICD-10-CM

## 2017-08-23 ENCOUNTER — OFFICE VISIT (OUTPATIENT)
Dept: ENDOCRINOLOGY | Age: 77
End: 2017-08-23

## 2017-08-23 VITALS
WEIGHT: 148.6 LBS | DIASTOLIC BLOOD PRESSURE: 70 MMHG | HEIGHT: 66 IN | BODY MASS INDEX: 23.88 KG/M2 | SYSTOLIC BLOOD PRESSURE: 124 MMHG

## 2017-08-23 DIAGNOSIS — E78.49 OTHER HYPERLIPIDEMIA: ICD-10-CM

## 2017-08-23 DIAGNOSIS — E55.9 VITAMIN D DEFICIENCY: ICD-10-CM

## 2017-08-23 DIAGNOSIS — Z91.119 NONCOMPLIANCE OF PATIENT WITH DIETARY REGIMEN: ICD-10-CM

## 2017-08-23 DIAGNOSIS — IMO0002 UNCONTROLLED TYPE 2 DIABETES MELLITUS WITH OTHER SPECIFIED COMPLICATION, WITH LONG-TERM CURRENT USE OF INSULIN: Primary | ICD-10-CM

## 2017-08-23 DIAGNOSIS — E11.42 DIABETIC PERIPHERAL NEUROPATHY (HCC): ICD-10-CM

## 2017-08-23 PROCEDURE — 99214 OFFICE O/P EST MOD 30 MIN: CPT | Performed by: NURSE PRACTITIONER

## 2017-08-23 RX ORDER — LISINOPRIL 10 MG/1
10 TABLET ORAL DAILY
COMMUNITY
End: 2017-08-23 | Stop reason: SDUPTHER

## 2017-08-23 RX ORDER — CILOSTAZOL 100 MG/1
100 TABLET ORAL 2 TIMES DAILY
Refills: 1 | COMMUNITY
Start: 2017-06-27 | End: 2018-07-27 | Stop reason: ALTCHOICE

## 2017-08-23 RX ORDER — LISINOPRIL 10 MG/1
10 TABLET ORAL DAILY
Qty: 90 TABLET | Refills: 1
Start: 2017-08-23 | End: 2018-02-02 | Stop reason: SDUPTHER

## 2017-08-23 RX ORDER — GABAPENTIN 100 MG/1
100 CAPSULE ORAL 3 TIMES DAILY
Qty: 90 CAPSULE | Refills: 2 | Status: SHIPPED | OUTPATIENT
Start: 2017-08-23 | End: 2017-08-23 | Stop reason: SDUPTHER

## 2017-08-23 RX ORDER — GABAPENTIN 100 MG/1
100 CAPSULE ORAL 3 TIMES DAILY
Qty: 90 CAPSULE | Refills: 2 | Status: SHIPPED | OUTPATIENT
Start: 2017-08-23 | End: 2018-02-02 | Stop reason: SDDI

## 2017-08-23 NOTE — PATIENT INSTRUCTIONS
Gabapentin 100 mg 1 pill 3 times daily for diabetic peripheral neuropathy  Lisinopril 10 mg once daily for bp and kidney protection  Labs pending

## 2017-08-23 NOTE — PROGRESS NOTES
"Subjective   Mitchpari Medrano is a 77 y.o. male is here today for follow-up.  Chief Complaint   Patient presents with   • Diabetes     no recent labs, testing BG 1 time daily, pt brought meter   • Hyperlipidemia     patient is not taking vascepa,linzess, valium   • Vitamin D Deficiency     patient has not been taking lantus either     /70  Ht 66\" (167.6 cm)  Wt 148 lb 9.6 oz (67.4 kg)  BMI 23.98 kg/m2  Current Outpatient Prescriptions on File Prior to Visit   Medication Sig   • aspirin-acetaminophen-caffeine (EXCEDRIN MIGRAINE) 250-250-65 MG per tablet Take 1 tablet by mouth every 6 (six) hours as needed for headaches.   • cholecalciferol (VITAMIN D3) 1000 UNITS tablet Take 1,000 Units by mouth Daily.   • glimepiride (AMARYL) 4 MG tablet Take 1 by mouth twice daily with meal   • glucose blood (TRUE METRIX BLOOD GLUCOSE TEST) test strip Use to test BG 1 time daily   • metFORMIN (GLUCOPHAGE) 500 MG tablet Take 2 tablets twice daily   • simvastatin (ZOCOR) 40 MG tablet Take 1 tablet by mouth Every Night.   • icosapent ethyl (VASCEPA) 1 G capsule capsule Take 2 g by mouth 2 (Two) Times a Day With Meals.   • [DISCONTINUED] diazePAM (VALIUM) 5 MG tablet Take 1 tablet by mouth Daily. Prn anxiety   • [DISCONTINUED] Insulin Pen Needle (BD PEN NEEDLE ZAY U/F) 32G X 4 MM misc Use to inject insulin one time   • [DISCONTINUED] LANTUS SOLOSTAR 100 UNIT/ML injection pen 5 units daily in am   • [DISCONTINUED] LINZESS 145 MCG capsule Take 1 daily 30 min prior to food     No current facility-administered medications on file prior to visit.      History reviewed. No pertinent family history.  Social History   Substance Use Topics   • Smoking status: Current Every Day Smoker   • Smokeless tobacco: Never Used   • Alcohol use No     Allergies   Allergen Reactions   • Flexeril [Cyclobenzaprine]          History of Present Illness   Encounter Diagnoses   Name Primary?   • Uncontrolled type 2 diabetes mellitus with other " specified complication, with long-term current use of insulin Yes   • Vitamin D deficiency    • Other hyperlipidemia    • Diabetic peripheral neuropathy      This is a 77-year-old male patient here today for a routine follow-up visit.  He is being seen for the above mentioned problems.  He is accompanied today by his wife.  He brought all his medications with him for review.  He states he recently went to the emergency room for leg pain and after spending 6 Corey he went up legal without being seen.  He stopped his statin for 3 weeks to see if the statin was causing the pain however it was not so he restarted it.  He has been diagnosed with intermittent claudication and is being followed by a specialist.  He quit taking his Lantus insulin because he was experiencing blood sugars in the 70 range which she states made him feel hypoglycemic.  He has some confusion regarding some his medications and is not sure which medications he is supposed to be taking.  He is not taking his vascepa, linzess, and Valium because he states his provider will no longer prescribe it for him.  He does have continued complaints of pain in his lower extremities that is possibly also a result of peripheral neuropathy.    The following portions of the patient's history were reviewed and updated as appropriate: allergies, current medications, past family history, past medical history, past social history, past surgical history and problem list.    Review of Systems   Constitutional: Negative for fatigue.   HENT: Negative for trouble swallowing.    Eyes: Negative for visual disturbance.   Respiratory: Negative for shortness of breath.    Cardiovascular: Negative for leg swelling.   Endocrine: Negative for polyphagia.   Skin: Negative for wound.   Neurological: Negative for numbness.       Objective   Physical Exam   Constitutional: He is oriented to person, place, and time. He appears well-developed and well-nourished. No distress.   HENT:    Head: Normocephalic and atraumatic.   Right Ear: External ear normal.   Left Ear: External ear normal.   Nose: Nose normal.   Eyes: Pupils are equal, round, and reactive to light. Right eye exhibits no discharge. Left eye exhibits no discharge.   Neck: Normal range of motion. Neck supple. Carotid bruit is not present. No tracheal deviation, no edema and no erythema present. No thyromegaly present.   Cardiovascular: Normal rate, regular rhythm, normal heart sounds and intact distal pulses.  Exam reveals no gallop and no friction rub.    No murmur heard.  Pulmonary/Chest: Effort normal and breath sounds normal. No respiratory distress. He has no wheezes. He has no rales.   Abdominal: Soft. Bowel sounds are normal. He exhibits no distension. There is no tenderness.   Musculoskeletal: He exhibits edema. He exhibits no deformity.   Uses cane. C/o pain in calf muscles   Lymphadenopathy:     He has no cervical adenopathy.   Neurological: He is alert and oriented to person, place, and time. Coordination normal.   Skin: Skin is warm and dry. No rash noted. He is not diaphoretic. No erythema. No pallor.   Psychiatric: He has a normal mood and affect. His behavior is normal. Judgment and thought content normal.   Nursing note and vitals reviewed.    Lab Results   Component Value Date    HGBA1C 8.1 (H) 03/07/2017         Assessment/Plan   Problems Addressed this Visit        Cardiovascular and Mediastinum    Hyperlipidemia       Digestive    Vitamin D deficiency       Endocrine    Diabetic peripheral neuropathy    Uncontrolled type 2 diabetes mellitus - Primary       Other    Noncompliance of patient with dietary regimen        In summary, patient was seen and examined.  Spent considerable time educating the patient regarding his medications and the mechanism of action.  He was advised that he needs to take his medications as prescribed without stopping and starting his medications on his own.  I've asked that he contact the  office with his blood sugar readings if he continues to have any problems with hypoglycemic reactions.  He was prescribed gabapentin 100 mg 1 pill 3 times daily for peripheral neuropathy.  DEEP Kincaid was reviewed at today's visit.  He was given a written prescription for the gabapentin  Gabapentin 100 mg 1 pill 3 times daily for diabetic peripheral neuropathy.  Patient was instructed in how to start off this medication taking 1 a day and titrate up to 3 times daily.  He will have extensive laboratory evaluation done at today's visit and will be notified of the results along with any further recommendations.  He is to follow-up in 4 months with labs prior.  I've encouraged him to contact the office should he have any questions or concerns prior to then  Lisinopril 10 mg once daily for bp and kidney protection  Labs pending

## 2017-08-24 LAB
25(OH)D3+25(OH)D2 SERPL-MCNC: 43.6 NG/ML (ref 30–100)
ALBUMIN SERPL-MCNC: 4 G/DL (ref 3.5–5.2)
ALBUMIN/GLOB SERPL: 1.9 G/DL
ALP SERPL-CCNC: 54 U/L (ref 39–117)
ALT SERPL-CCNC: 10 U/L (ref 1–41)
AST SERPL-CCNC: 15 U/L (ref 1–40)
BILIRUB SERPL-MCNC: <0.2 MG/DL (ref 0.1–1.2)
BUN SERPL-MCNC: 24 MG/DL (ref 8–23)
BUN/CREAT SERPL: 17.9 (ref 7–25)
C PEPTIDE SERPL-MCNC: 5 NG/ML (ref 1.1–4.4)
CALCIUM SERPL-MCNC: 9.2 MG/DL (ref 8.6–10.5)
CHLORIDE SERPL-SCNC: 105 MMOL/L (ref 98–107)
CHOLEST SERPL-MCNC: 125 MG/DL (ref 0–200)
CO2 SERPL-SCNC: 22 MMOL/L (ref 22–29)
CREAT SERPL-MCNC: 1.34 MG/DL (ref 0.76–1.27)
FT4I SERPL CALC-MCNC: 1.5 (ref 1.2–4.9)
GLOBULIN SER CALC-MCNC: 2.1 GM/DL
GLUCOSE SERPL-MCNC: 179 MG/DL (ref 65–99)
HBA1C MFR BLD: 8.4 % (ref 4.8–5.6)
HDLC SERPL-MCNC: 33 MG/DL (ref 40–60)
LDLC SERPL CALC-MCNC: 49 MG/DL (ref 0–100)
MICROALBUMIN UR-MCNC: <3 UG/ML
POTASSIUM SERPL-SCNC: 5.5 MMOL/L (ref 3.5–5.2)
PROT SERPL-MCNC: 6.1 G/DL (ref 6–8.5)
SODIUM SERPL-SCNC: 142 MMOL/L (ref 136–145)
T3FREE SERPL-MCNC: 1.7 PG/ML (ref 2–4.4)
T3RU NFR SERPL: 28 % (ref 24–39)
T4 FREE SERPL-MCNC: 1.12 NG/DL (ref 0.93–1.7)
T4 SERPL-MCNC: 5.2 UG/DL (ref 4.5–12)
TRIGL SERPL-MCNC: 215 MG/DL (ref 0–150)
TSH SERPL DL<=0.005 MIU/L-ACNC: 1.65 UIU/ML (ref 0.45–4.5)
VLDLC SERPL CALC-MCNC: 43 MG/DL (ref 5–40)

## 2017-08-28 RX ORDER — ICOSAPENT ETHYL 1000 MG/1
CAPSULE ORAL
Qty: 120 CAPSULE | Refills: 5 | Status: SHIPPED | OUTPATIENT
Start: 2017-08-28 | End: 2018-02-02

## 2017-08-28 RX ORDER — INSULIN GLARGINE 100 [IU]/ML
INJECTION, SOLUTION SUBCUTANEOUS
Qty: 15 ML | Refills: 5 | Status: SHIPPED | OUTPATIENT
Start: 2017-08-28 | End: 2018-02-02

## 2017-10-10 DIAGNOSIS — R53.82 CHRONIC FATIGUE: ICD-10-CM

## 2017-10-10 DIAGNOSIS — E11.42 DIABETIC PERIPHERAL NEUROPATHY (HCC): ICD-10-CM

## 2017-10-10 DIAGNOSIS — E55.9 VITAMIN D DEFICIENCY: ICD-10-CM

## 2017-10-10 DIAGNOSIS — E78.49 OTHER HYPERLIPIDEMIA: ICD-10-CM

## 2017-10-12 RX ORDER — CALCIUM CITRATE/VITAMIN D3 200MG-6.25
TABLET ORAL
Qty: 100 EACH | Refills: 0 | Status: SHIPPED | OUTPATIENT
Start: 2017-10-12 | End: 2018-02-06 | Stop reason: SDUPTHER

## 2017-10-12 RX ORDER — SIMVASTATIN 40 MG
TABLET ORAL
Qty: 90 TABLET | Refills: 0 | Status: SHIPPED | OUTPATIENT
Start: 2017-10-12 | End: 2018-02-02 | Stop reason: SDUPTHER

## 2017-10-12 RX ORDER — GLIMEPIRIDE 4 MG/1
TABLET ORAL
Qty: 180 TABLET | Refills: 0 | Status: SHIPPED | OUTPATIENT
Start: 2017-10-12 | End: 2018-02-02 | Stop reason: SDUPTHER

## 2018-01-17 DIAGNOSIS — IMO0002 UNCONTROLLED TYPE 2 DIABETES MELLITUS WITH OTHER SPECIFIED COMPLICATION, WITH LONG-TERM CURRENT USE OF INSULIN: Primary | ICD-10-CM

## 2018-01-17 DIAGNOSIS — E55.9 VITAMIN D DEFICIENCY: ICD-10-CM

## 2018-02-02 ENCOUNTER — OFFICE VISIT (OUTPATIENT)
Dept: ENDOCRINOLOGY | Age: 78
End: 2018-02-02

## 2018-02-02 VITALS
HEIGHT: 66 IN | SYSTOLIC BLOOD PRESSURE: 150 MMHG | RESPIRATION RATE: 16 BRPM | DIASTOLIC BLOOD PRESSURE: 86 MMHG | BODY MASS INDEX: 22.82 KG/M2 | WEIGHT: 142 LBS

## 2018-02-02 DIAGNOSIS — E11.42 DIABETIC PERIPHERAL NEUROPATHY (HCC): ICD-10-CM

## 2018-02-02 DIAGNOSIS — Z91.119 NONCOMPLIANCE OF PATIENT WITH DIETARY REGIMEN: ICD-10-CM

## 2018-02-02 DIAGNOSIS — E55.9 VITAMIN D DEFICIENCY: ICD-10-CM

## 2018-02-02 DIAGNOSIS — IMO0001 UNCONTROLLED DIABETES MELLITUS TYPE 2 WITHOUT COMPLICATIONS, UNSPECIFIED LONG TERM INSULIN USE STATUS: Primary | ICD-10-CM

## 2018-02-02 DIAGNOSIS — E78.49 OTHER HYPERLIPIDEMIA: ICD-10-CM

## 2018-02-02 PROCEDURE — 99214 OFFICE O/P EST MOD 30 MIN: CPT | Performed by: NURSE PRACTITIONER

## 2018-02-02 RX ORDER — LISINOPRIL 20 MG/1
20 TABLET ORAL DAILY
Qty: 90 TABLET | Refills: 1
Start: 2018-02-02 | End: 2018-07-27

## 2018-02-02 RX ORDER — GLIMEPIRIDE 4 MG/1
4 TABLET ORAL
Qty: 180 TABLET | Refills: 1 | Status: SHIPPED | OUTPATIENT
Start: 2018-02-02 | End: 2018-07-27 | Stop reason: SDUPTHER

## 2018-02-02 RX ORDER — SIMVASTATIN 40 MG
40 TABLET ORAL NIGHTLY
Qty: 90 TABLET | Refills: 1 | Status: SHIPPED | OUTPATIENT
Start: 2018-02-02 | End: 2019-02-01 | Stop reason: SDUPTHER

## 2018-02-02 NOTE — PROGRESS NOTES
"Subjective   Courtney Medrano is a 77 y.o. male is here today for follow-up.  Chief Complaint   Patient presents with   • Diabetes     is not taking any injections due to price; checking BG once a day; no lab review.    • Vitamin D Deficiency     Chief Complaint   Patient presents with   • Diabetes     is not taking any injections due to price; checking BG once a day; no lab review.    • Vitamin D Deficiency     /86  Resp 16  Ht 167.6 cm (66\")  Wt 64.4 kg (142 lb)  BMI 22.92 kg/m2  Current Outpatient Prescriptions on File Prior to Visit   Medication Sig   • aspirin-acetaminophen-caffeine (EXCEDRIN MIGRAINE) 250-250-65 MG per tablet Take 1 tablet by mouth every 6 (six) hours as needed for headaches.   • cholecalciferol (VITAMIN D3) 1000 UNITS tablet Take 1,000 Units by mouth Daily.   • cilostazol (PLETAL) 100 MG tablet Take 100 mg by mouth 2 (Two) Times a Day.   • glimepiride (AMARYL) 4 MG tablet TAKE 1 TABLET TWICE DAILY WITH A MEAL   • lisinopril (PRINIVIL,ZESTRIL) 10 MG tablet Take 1 tablet by mouth Daily.   • metFORMIN (GLUCOPHAGE) 500 MG tablet Take 2 tablets twice daily   • simvastatin (ZOCOR) 40 MG tablet TAKE 1 TABLET EVERY NIGHT   • TRUE METRIX BLOOD GLUCOSE TEST test strip TEST BLOOD GLUCOSE ONE TIME DAILY   • [DISCONTINUED] gabapentin (NEURONTIN) 100 MG capsule Take 1 capsule by mouth 3 (Three) Times a Day.   • [DISCONTINUED] LANTUS SOLOSTAR 100 UNIT/ML injection pen 15 units daily in am   • [DISCONTINUED] VASCEPA 1 g capsule capsule Take 2 capsules twice daily with meals     No current facility-administered medications on file prior to visit.      History reviewed. No pertinent family history.  Social History   Substance Use Topics   • Smoking status: Current Every Day Smoker   • Smokeless tobacco: Never Used   • Alcohol use No     Allergies   Allergen Reactions   • Flexeril [Cyclobenzaprine]          History of Present Illness  Encounter Diagnoses   Name Primary?   • Other hyperlipidemia Yes "   • Vitamin D deficiency    • Uncontrolled diabetes mellitus type 2 without complications, unspecified long term insulin use status    • Diabetic peripheral neuropathy      This is a 77-year-old male patient here today for routine follow-up visit.  He has not had any recent labs done.  He is being seen for the above diagnoses.  He is accompanied by his son.  He states he is tired today because.mouth at 6 AM to go the bathroom.  He is slightly hypertensive at today's visit.  He states he is taking his medications as prescribed however he states he did not get his Lantus so due to cost.  He states they wanted over $700 for that medication.  He is needing refills to his mail order pharmacy.  He has no complaints of hypoglycemic reactions.  His blood sugars were reviewed and are typically less than 1 50 mg/dL according to his meter download.  The following portions of the patient's history were reviewed and updated as appropriate: allergies, current medications, past family history, past medical history, past social history, past surgical history and problem list.    Review of Systems   Constitutional: Negative for fatigue.   Endocrine: Negative for cold intolerance and heat intolerance.   Neurological: Positive for headaches.       Objective   Physical Exam   Constitutional: He is oriented to person, place, and time. He appears well-developed and well-nourished. No distress.   HENT:   Head: Normocephalic and atraumatic.   Right Ear: External ear normal.   Left Ear: External ear normal.   Nose: Nose normal.   Eyes: Pupils are equal, round, and reactive to light. Right eye exhibits no discharge. Left eye exhibits no discharge.   Neck: Normal range of motion. Neck supple. Carotid bruit is not present. No tracheal deviation, no edema and no erythema present. No thyromegaly present.   Cardiovascular: Normal rate, regular rhythm, normal heart sounds and intact distal pulses.  Exam reveals no gallop and no friction rub.    No  murmur heard.  Pulmonary/Chest: Effort normal and breath sounds normal. No respiratory distress. He has no wheezes. He has no rales.   Abdominal: Soft. Bowel sounds are normal. He exhibits no distension. There is no tenderness.   Musculoskeletal: He exhibits edema. He exhibits no deformity.   Uses cane. C/o pain in calf muscles   Lymphadenopathy:     He has no cervical adenopathy.   Neurological: He is alert and oriented to person, place, and time. Coordination normal.   Skin: Skin is warm and dry. No rash noted. He is not diaphoretic. No erythema. No pallor.   Psychiatric: He has a normal mood and affect. His behavior is normal. Judgment and thought content normal.   Nursing note and vitals reviewed.      Results for orders placed or performed in visit on 08/23/17   Comprehensive Metabolic Panel   Result Value Ref Range    Glucose 179 (H) 65 - 99 mg/dL    BUN 24 (H) 8 - 23 mg/dL    Creatinine 1.34 (H) 0.76 - 1.27 mg/dL    eGFR Non African Am 52 (L) >60 mL/min/1.73    eGFR African Am 63 >60 mL/min/1.73    BUN/Creatinine Ratio 17.9 7.0 - 25.0    Sodium 142 136 - 145 mmol/L    Potassium 5.5 (H) 3.5 - 5.2 mmol/L    Chloride 105 98 - 107 mmol/L    Total CO2 22.0 22.0 - 29.0 mmol/L    Calcium 9.2 8.6 - 10.5 mg/dL    Total Protein 6.1 6.0 - 8.5 g/dL    Albumin 4.00 3.50 - 5.20 g/dL    Globulin 2.1 gm/dL    A/G Ratio 1.9 g/dL    Total Bilirubin <0.2 0.1 - 1.2 mg/dL    Alkaline Phosphatase 54 39 - 117 U/L    AST (SGOT) 15 1 - 40 U/L    ALT (SGPT) 10 1 - 41 U/L   C-Peptide   Result Value Ref Range    C-Peptide 5.0 (H) 1.1 - 4.4 ng/mL   Hemoglobin A1c   Result Value Ref Range    Hemoglobin A1C 8.40 (H) 4.80 - 5.60 %   Lipid Panel   Result Value Ref Range    Total Cholesterol 125 0 - 200 mg/dL    Triglycerides 215 (H) 0 - 150 mg/dL    HDL Cholesterol 33 (L) 40 - 60 mg/dL    VLDL Cholesterol 43 (H) 5 - 40 mg/dL    LDL Cholesterol  49 0 - 100 mg/dL   MicroAlbumin, Urine, Random   Result Value Ref Range    Microalbumin, Urine  <3.0 Not Estab. ug/mL   T3, Free   Result Value Ref Range    T3, Free 1.7 (L) 2.0 - 4.4 pg/mL   T4, Free   Result Value Ref Range    Free T4 1.12 0.93 - 1.70 ng/dL   Thyroid Panel With TSH   Result Value Ref Range    TSH 1.650 0.450 - 4.500 uIU/mL    T4, Total 5.2 4.5 - 12.0 ug/dL    T3 Uptake 28 24 - 39 %    Free Thyroxine Index 1.5 1.2 - 4.9   Vitamin D 25 Hydroxy   Result Value Ref Range    25 Hydroxy, Vitamin D 43.6 30.0 - 100.0 ng/mL       Assessment/Plan   Problems Addressed this Visit        Cardiovascular and Mediastinum    Hyperlipidemia - Primary       Digestive    Vitamin D deficiency       Endocrine    Diabetes type 2, uncontrolled    Diabetic peripheral neuropathy          In summary, patient was seen and examined.  His lab results from his last visit were reviewed and he was provided a copy.  He will have extensive laboratory evaluation done at today's visit will be notified of the results along with any further recommendations.  He's had no hypoglycemic events according to his meter download.  He has been cautioned on hypoglycemia risk associated with glimepiride.  He is not taking vascepa or Lantus that was prescribed for him last visit for hemoglobin A1c of 8.4.  His medications were refilled at today's visit.  Metabolically he is stable.  He's been advised to monitor his blood pressure.  He will follow-up in 4 months with labs

## 2018-02-03 LAB
25(OH)D3+25(OH)D2 SERPL-MCNC: 79 NG/ML (ref 30–100)
ALBUMIN SERPL-MCNC: 3.9 G/DL (ref 3.5–5.2)
ALBUMIN/GLOB SERPL: 1.6 G/DL
ALP SERPL-CCNC: 57 U/L (ref 39–117)
ALT SERPL-CCNC: 12 U/L (ref 1–41)
AST SERPL-CCNC: 17 U/L (ref 1–40)
BILIRUB SERPL-MCNC: 0.2 MG/DL (ref 0.1–1.2)
BUN SERPL-MCNC: 23 MG/DL (ref 8–23)
BUN/CREAT SERPL: 21.9 (ref 7–25)
C PEPTIDE SERPL-MCNC: 3.6 NG/ML (ref 1.1–4.4)
CALCIUM SERPL-MCNC: 9.8 MG/DL (ref 8.6–10.5)
CHLORIDE SERPL-SCNC: 104 MMOL/L (ref 98–107)
CHOLEST SERPL-MCNC: 177 MG/DL (ref 0–200)
CO2 SERPL-SCNC: 23.5 MMOL/L (ref 22–29)
CREAT SERPL-MCNC: 1.05 MG/DL (ref 0.76–1.27)
GFR SERPLBLD CREATININE-BSD FMLA CKD-EPI: 69 ML/MIN/1.73
GFR SERPLBLD CREATININE-BSD FMLA CKD-EPI: 83 ML/MIN/1.73
GLOBULIN SER CALC-MCNC: 2.4 GM/DL
GLUCOSE SERPL-MCNC: 174 MG/DL (ref 65–99)
HBA1C MFR BLD: 7.96 % (ref 4.8–5.6)
HDLC SERPL-MCNC: 45 MG/DL (ref 40–60)
INTERPRETATION: NORMAL
LDLC SERPL CALC-MCNC: 94 MG/DL (ref 0–100)
Lab: NORMAL
MICROALBUMIN UR-MCNC: 11 UG/ML
POTASSIUM SERPL-SCNC: 4.4 MMOL/L (ref 3.5–5.2)
PROT SERPL-MCNC: 6.3 G/DL (ref 6–8.5)
SODIUM SERPL-SCNC: 144 MMOL/L (ref 136–145)
TRIGL SERPL-MCNC: 192 MG/DL (ref 0–150)
VLDLC SERPL CALC-MCNC: 38.4 MG/DL (ref 5–40)

## 2018-02-06 DIAGNOSIS — E78.49 OTHER HYPERLIPIDEMIA: ICD-10-CM

## 2018-02-06 DIAGNOSIS — E55.9 VITAMIN D DEFICIENCY: ICD-10-CM

## 2018-02-06 DIAGNOSIS — R53.82 CHRONIC FATIGUE: ICD-10-CM

## 2018-02-06 DIAGNOSIS — E11.42 DIABETIC PERIPHERAL NEUROPATHY (HCC): ICD-10-CM

## 2018-02-17 ENCOUNTER — RESULTS ENCOUNTER (OUTPATIENT)
Dept: ENDOCRINOLOGY | Age: 78
End: 2018-02-17

## 2018-02-17 DIAGNOSIS — E55.9 VITAMIN D DEFICIENCY: ICD-10-CM

## 2018-02-17 DIAGNOSIS — IMO0002 UNCONTROLLED TYPE 2 DIABETES MELLITUS WITH OTHER SPECIFIED COMPLICATION, WITH LONG-TERM CURRENT USE OF INSULIN: ICD-10-CM

## 2018-06-22 DIAGNOSIS — H57.9 EYE EXAM ABNORMAL: Primary | ICD-10-CM

## 2018-06-22 DIAGNOSIS — IMO0001 UNCONTROLLED DIABETES MELLITUS TYPE 2 WITHOUT COMPLICATIONS, UNSPECIFIED LONG TERM INSULIN USE STATUS: ICD-10-CM

## 2018-06-22 DIAGNOSIS — H43.391 VITREOUS FLOATERS OF RIGHT EYE: ICD-10-CM

## 2018-07-27 ENCOUNTER — OFFICE VISIT (OUTPATIENT)
Dept: ENDOCRINOLOGY | Age: 78
End: 2018-07-27

## 2018-07-27 VITALS
SYSTOLIC BLOOD PRESSURE: 138 MMHG | DIASTOLIC BLOOD PRESSURE: 100 MMHG | BODY MASS INDEX: 22.34 KG/M2 | WEIGHT: 139 LBS | HEIGHT: 66 IN

## 2018-07-27 DIAGNOSIS — E78.49 OTHER HYPERLIPIDEMIA: ICD-10-CM

## 2018-07-27 DIAGNOSIS — IMO0001 UNCONTROLLED DIABETES MELLITUS TYPE 2 WITHOUT COMPLICATIONS, UNSPECIFIED LONG TERM INSULIN USE STATUS: Primary | ICD-10-CM

## 2018-07-27 DIAGNOSIS — Z91.14 NONCOMPLIANCE WITH MEDICATIONS: ICD-10-CM

## 2018-07-27 DIAGNOSIS — N41.9 PROSTATITIS, UNSPECIFIED PROSTATITIS TYPE: ICD-10-CM

## 2018-07-27 DIAGNOSIS — I10 BENIGN ESSENTIAL HTN: ICD-10-CM

## 2018-07-27 DIAGNOSIS — E55.9 VITAMIN D DEFICIENCY: ICD-10-CM

## 2018-07-27 DIAGNOSIS — Z91.119 NONCOMPLIANCE OF PATIENT WITH DIETARY REGIMEN: ICD-10-CM

## 2018-07-27 DIAGNOSIS — E11.42 DIABETIC PERIPHERAL NEUROPATHY (HCC): ICD-10-CM

## 2018-07-27 PROBLEM — Z91.148 NONCOMPLIANCE WITH MEDICATIONS: Status: ACTIVE | Noted: 2018-07-27

## 2018-07-27 PROCEDURE — 99214 OFFICE O/P EST MOD 30 MIN: CPT | Performed by: NURSE PRACTITIONER

## 2018-07-27 RX ORDER — LISINOPRIL 10 MG/1
10 TABLET ORAL DAILY
Qty: 30 TABLET | Refills: 5 | Status: SHIPPED | OUTPATIENT
Start: 2018-07-27 | End: 2019-02-01 | Stop reason: SDUPTHER

## 2018-07-27 RX ORDER — GLIMEPIRIDE 4 MG/1
8 TABLET ORAL
Qty: 180 TABLET | Refills: 1 | Status: SHIPPED | OUTPATIENT
Start: 2018-07-27 | End: 2019-02-01 | Stop reason: SDUPTHER

## 2018-07-27 NOTE — PROGRESS NOTES
"Subjective   Courtney Medrano is a 78 y.o. male is here today for follow-up.  Chief Complaint   Patient presents with   • Diabetes     no recent labs, pt tests BG 1x daily, pt did not bring meter   • Hyperlipidemia     pt no longer taking lisinopril   • Vitamin D Deficiency   • Peripheral Neuropathy     /100   Ht 167.6 cm (66\")   Wt 63 kg (139 lb)   BMI 22.44 kg/m²   Current Outpatient Prescriptions on File Prior to Visit   Medication Sig   • aspirin-acetaminophen-caffeine (EXCEDRIN MIGRAINE) 250-250-65 MG per tablet Take 1 tablet by mouth every 6 (six) hours as needed for headaches.   • cholecalciferol (VITAMIN D3) 1000 UNITS tablet Take 1,000 Units by mouth Daily.   • glimepiride (AMARYL) 4 MG tablet Take 1 tablet by mouth 2 (Two) Times a Day Before Meals.   • glucose blood (TRUE METRIX BLOOD GLUCOSE TEST) test strip Use to test up to 2x daily. DX CODE: E11.65   • metFORMIN (GLUCOPHAGE) 500 MG tablet Take 2 tablets twice daily (Patient taking differently: Take 2 tablets daily)   • Multiple Vitamins-Minerals (MULTIVITAMIN MEN 50+) tablet Take  by mouth.   • simvastatin (ZOCOR) 40 MG tablet Take 1 tablet by mouth Every Night.   • [DISCONTINUED] lisinopril (PRINIVIL,ZESTRIL) 20 MG tablet Take 1 tablet by mouth Daily.   • [DISCONTINUED] cilostazol (PLETAL) 100 MG tablet Take 100 mg by mouth 2 (Two) Times a Day.     No current facility-administered medications on file prior to visit.      No family history on file.  Social History   Substance Use Topics   • Smoking status: Current Every Day Smoker   • Smokeless tobacco: Never Used   • Alcohol use No     Allergies   Allergen Reactions   • Flexeril [Cyclobenzaprine]          History of Present Illness  Encounter Diagnoses   Name Primary?   • Other hyperlipidemia    • Vitamin D deficiency    • Uncontrolled diabetes mellitus type 2 without complications, unspecified long term insulin use status (CMS/[facility]) Yes   • Diabetic peripheral neuropathy (CMS/[facility])    • " Benign essential HTN      78-year-old male patient here today for routine follow-up visit.  He is a coming by his wife and his son.  He is being seen for the above-mentioned proms.  He has not had recent labs.  He forgot to bring his meter states he was running late to his appointment today.  He is not taking lisinopril however he doesn't recall ever taking it.  He is hypertensive at today's visit.  He is currently not on any blood pressure medication.  He states he did not get medication so that was going to cost him over $700 due to cost.  His previous medical record was reviewed and he was prescribed vascepa as well as Lantus in the past based on his triglycerides in his hemoglobin A1c.  He is due for labs at today's visit will have labs done today and will be notified of the results along with any further recommendations.  He has on occasion had a hypoglycemic events or his cut down his metformin to only 2 pills daily.  He states his blood sugar this morning he was in the 80 range.  He has had a recent fall when was out cutting grass but states his foot fell on a hole.  He does use a cane for mobility.  A foot exam was performed at today's visit.  He has no neuropathy.  The following portions of the patient's history were reviewed and updated as appropriate: allergies, current medications, past family history, past medical history, past social history, past surgical history and problem list.    Review of Systems   Constitutional: Negative for fatigue.   HENT: Negative for trouble swallowing.    Eyes: Negative for visual disturbance.   Respiratory: Negative for shortness of breath.    Cardiovascular: Negative for leg swelling.   Endocrine: Negative for polyuria.   Skin: Negative for wound.   Neurological: Negative for numbness.       Objective   Physical Exam   Constitutional: He is oriented to person, place, and time. He appears well-developed and well-nourished. No distress.   HENT:   Head: Normocephalic and  atraumatic.   Right Ear: External ear normal.   Left Ear: External ear normal.   Nose: Nose normal.   Eyes: Pupils are equal, round, and reactive to light. Right eye exhibits no discharge. Left eye exhibits no discharge.   Neck: Normal range of motion. Neck supple. Carotid bruit is not present. No tracheal deviation, no edema and no erythema present. No thyromegaly present.   Cardiovascular: Normal rate, regular rhythm, normal heart sounds and intact distal pulses.  Exam reveals no gallop and no friction rub.    No murmur heard.  Pulmonary/Chest: Effort normal and breath sounds normal. No respiratory distress. He has no wheezes. He has no rales.   Abdominal: Soft. Bowel sounds are normal. He exhibits no distension. There is no tenderness.   Musculoskeletal: He exhibits no edema or deformity.   Uses cane    Diabetic foot exam performed: exam performed.   During the foot exam he had a monofilament test performed (normal).  Vascular Status -  His right foot exhibits normal foot vasculature  and no edema. His left foot exhibits normal foot vasculature  and no edema.  Skin Integrity  -  His right foot skin is intact.His left foot skin is intact..  Lymphadenopathy:     He has no cervical adenopathy.   Neurological: He is alert and oriented to person, place, and time. Coordination normal.   Skin: Skin is warm and dry. No rash noted. He is not diaphoretic. No erythema. No pallor.   Psychiatric: He has a normal mood and affect. His behavior is normal. Judgment and thought content normal.   Nursing note and vitals reviewed.        Assessment/Plan   Problems Addressed this Visit        Cardiovascular and Mediastinum    Hyperlipidemia    Benign essential HTN       Digestive    Vitamin D deficiency       Endocrine    Diabetes type 2, uncontrolled (CMS/HCC) - Primary    Diabetic peripheral neuropathy (CMS/HCC)        In summary, patient was seen and examined.  Exam was normal.  He does have some discoloration on his left great  toe that maybe fungal.  He is hypertensive at today's visit.  Currently he is not on any blood pressure medication.  He will be started on lisinopril 10 mg once daily.  The cost was reviewed and should be affordable to patient.  Just in case it was sent to his local pharmacy [facility] for 30 day supply.  I've asked that he monitor his blood pressure as well as blood sugars and bring his meter with him to each visit so that we can adjust his medications based on blood sugars.  He will have labs done at today's visit will be notified of the results.  He was provided his lab results from his last 2 visits along with recommendations.  I've asked the family that they compile a list of his current medications why he is currently taking so that we can make sure that we have his medication records correct.  He will follow-up in 6 months with labs.

## 2018-07-27 NOTE — PATIENT INSTRUCTIONS
Labs pending  Monitor bp  Bring meter each visit  Lisinopril 10 mg once daily  Continue current meds pending labs

## 2018-07-28 LAB
25(OH)D3+25(OH)D2 SERPL-MCNC: 65 NG/ML (ref 30–100)
ALBUMIN SERPL-MCNC: 4.4 G/DL (ref 3.5–5.2)
ALBUMIN/GLOB SERPL: 2.2 G/DL
ALP SERPL-CCNC: 56 U/L (ref 39–117)
ALT SERPL-CCNC: 11 U/L (ref 1–41)
AST SERPL-CCNC: 14 U/L (ref 1–40)
BILIRUB SERPL-MCNC: 0.2 MG/DL (ref 0.1–1.2)
BUN SERPL-MCNC: 23 MG/DL (ref 8–23)
BUN/CREAT SERPL: 19 (ref 7–25)
C PEPTIDE SERPL-MCNC: 3.4 NG/ML (ref 1.1–4.4)
CALCIUM SERPL-MCNC: 10.2 MG/DL (ref 8.6–10.5)
CHLORIDE SERPL-SCNC: 104 MMOL/L (ref 98–107)
CHOLEST SERPL-MCNC: 179 MG/DL (ref 0–200)
CO2 SERPL-SCNC: 23 MMOL/L (ref 22–29)
CREAT SERPL-MCNC: 1.21 MG/DL (ref 0.76–1.27)
FT4I SERPL CALC-MCNC: 1.7 (ref 1.2–4.9)
GLOBULIN SER CALC-MCNC: 2 GM/DL
GLUCOSE SERPL-MCNC: 159 MG/DL (ref 65–99)
HBA1C MFR BLD: 7.6 % (ref 4.8–5.6)
HDLC SERPL-MCNC: 40 MG/DL (ref 40–60)
INTERPRETATION: NORMAL
LDLC SERPL CALC-MCNC: 88 MG/DL (ref 0–100)
Lab: NORMAL
POTASSIUM SERPL-SCNC: 4.9 MMOL/L (ref 3.5–5.2)
PROT SERPL-MCNC: 6.4 G/DL (ref 6–8.5)
SODIUM SERPL-SCNC: 142 MMOL/L (ref 136–145)
T3FREE SERPL-MCNC: 2 PG/ML (ref 2–4.4)
T3RU NFR SERPL: 29 % (ref 24–39)
T4 FREE SERPL-MCNC: 1.28 NG/DL (ref 0.93–1.7)
T4 SERPL-MCNC: 5.9 UG/DL (ref 4.5–12)
TRIGL SERPL-MCNC: 254 MG/DL (ref 0–150)
TSH SERPL DL<=0.005 MIU/L-ACNC: 1.17 UIU/ML (ref 0.45–4.5)
UNABLE TO VOID: NORMAL
VLDLC SERPL CALC-MCNC: 50.8 MG/DL (ref 5–40)

## 2018-09-13 DIAGNOSIS — E55.9 VITAMIN D DEFICIENCY: ICD-10-CM

## 2018-09-13 DIAGNOSIS — E78.49 OTHER HYPERLIPIDEMIA: ICD-10-CM

## 2018-09-13 DIAGNOSIS — R53.82 CHRONIC FATIGUE: ICD-10-CM

## 2018-09-13 DIAGNOSIS — E11.42 DIABETIC PERIPHERAL NEUROPATHY (HCC): ICD-10-CM

## 2018-09-14 RX ORDER — CALCIUM CITRATE/VITAMIN D3 200MG-6.25
TABLET ORAL
Qty: 100 EACH | Refills: 0 | Status: SHIPPED | OUTPATIENT
Start: 2018-09-14 | End: 2019-02-01 | Stop reason: SDUPTHER

## 2019-02-01 ENCOUNTER — OFFICE VISIT (OUTPATIENT)
Dept: ENDOCRINOLOGY | Age: 79
End: 2019-02-01

## 2019-02-01 VITALS
HEIGHT: 66 IN | BODY MASS INDEX: 22.66 KG/M2 | DIASTOLIC BLOOD PRESSURE: 80 MMHG | SYSTOLIC BLOOD PRESSURE: 132 MMHG | WEIGHT: 141 LBS

## 2019-02-01 DIAGNOSIS — R53.82 CHRONIC FATIGUE: ICD-10-CM

## 2019-02-01 DIAGNOSIS — IMO0001 UNCONTROLLED DIABETES MELLITUS TYPE 2 WITHOUT COMPLICATIONS: ICD-10-CM

## 2019-02-01 DIAGNOSIS — E11.65 UNCONTROLLED TYPE 2 DIABETES MELLITUS WITH HYPERGLYCEMIA (HCC): Primary | ICD-10-CM

## 2019-02-01 DIAGNOSIS — Z91.119 NONCOMPLIANCE OF PATIENT WITH DIETARY REGIMEN: ICD-10-CM

## 2019-02-01 DIAGNOSIS — E11.42 DIABETIC PERIPHERAL NEUROPATHY (HCC): ICD-10-CM

## 2019-02-01 DIAGNOSIS — E78.49 OTHER HYPERLIPIDEMIA: ICD-10-CM

## 2019-02-01 DIAGNOSIS — F41.9 ANXIETY: ICD-10-CM

## 2019-02-01 DIAGNOSIS — I10 BENIGN ESSENTIAL HTN: ICD-10-CM

## 2019-02-01 DIAGNOSIS — N41.9 PROSTATITIS, UNSPECIFIED PROSTATITIS TYPE: ICD-10-CM

## 2019-02-01 DIAGNOSIS — Z91.14 NONCOMPLIANCE WITH MEDICATIONS: ICD-10-CM

## 2019-02-01 DIAGNOSIS — E55.9 VITAMIN D DEFICIENCY: ICD-10-CM

## 2019-02-01 DIAGNOSIS — K61.1 PERIRECTAL ABSCESS: ICD-10-CM

## 2019-02-01 PROCEDURE — 99214 OFFICE O/P EST MOD 30 MIN: CPT | Performed by: NURSE PRACTITIONER

## 2019-02-01 RX ORDER — SIMVASTATIN 40 MG
40 TABLET ORAL NIGHTLY
Qty: 90 TABLET | Refills: 1 | Status: SHIPPED | OUTPATIENT
Start: 2019-02-01 | End: 2019-08-27 | Stop reason: SDUPTHER

## 2019-02-01 RX ORDER — LISINOPRIL 10 MG/1
10 TABLET ORAL DAILY
Qty: 90 TABLET | Refills: 1 | Status: SHIPPED | OUTPATIENT
Start: 2019-02-01 | End: 2019-08-27 | Stop reason: SDUPTHER

## 2019-02-01 RX ORDER — ESCITALOPRAM OXALATE 10 MG/1
10 TABLET ORAL DAILY
Qty: 90 TABLET | Refills: 1 | Status: SHIPPED | OUTPATIENT
Start: 2019-02-01 | End: 2019-07-15

## 2019-02-01 RX ORDER — GLIMEPIRIDE 4 MG/1
8 TABLET ORAL
Qty: 180 TABLET | Refills: 1 | Status: SHIPPED | OUTPATIENT
Start: 2019-02-01 | End: 2019-08-27 | Stop reason: SDUPTHER

## 2019-02-01 NOTE — PATIENT INSTRUCTIONS
lexapro 10 mg once daily for anxiety  Gastro referrred for perirectal abscess  Labs pending  Continue all current meds the same

## 2019-02-01 NOTE — PROGRESS NOTES
"Subjective   Mitchpari Medrano is a 78 y.o. male is here today for follow-up.  Chief Complaint   Patient presents with   • Diabetes     no recent labs, pt is not testing BG and did not bring meter   • Hyperlipidemia   • Hypertension   • Vitamin D Deficiency     /80   Ht 167.6 cm (66\")   Wt 64 kg (141 lb)   BMI 22.76 kg/m²   Current Outpatient Medications on File Prior to Visit   Medication Sig   • aspirin-acetaminophen-caffeine (EXCEDRIN MIGRAINE) 250-250-65 MG per tablet Take 1 tablet by mouth every 6 (six) hours as needed for headaches.   • cholecalciferol (VITAMIN D3) 1000 UNITS tablet Take 1,000 Units by mouth Daily.   • glimepiride (AMARYL) 4 MG tablet Take 2 tablets by mouth Every Morning Before Breakfast.   • lisinopril (PRINIVIL,ZESTRIL) 10 MG tablet Take 1 tablet by mouth Daily.   • metFORMIN (GLUCOPHAGE) 500 MG tablet Take 2 tablets twice daily (Patient taking differently: Take 2 tablets daily)   • Multiple Vitamins-Minerals (MULTIVITAMIN MEN 50+) tablet Take  by mouth.   • simvastatin (ZOCOR) 40 MG tablet Take 1 tablet by mouth Every Night.   • TRUE METRIX BLOOD GLUCOSE TEST test strip TEST BLOOD GLUCOSE ONE TIME DAILY     No current facility-administered medications on file prior to visit.      History reviewed. No pertinent family history.  Social History     Tobacco Use   • Smoking status: Current Every Day Smoker   • Smokeless tobacco: Never Used   Substance Use Topics   • Alcohol use: No   • Drug use: No     Allergies   Allergen Reactions   • Flexeril [Cyclobenzaprine]          History of Present Illness  Encounter Diagnoses   Name Primary?   • Benign essential HTN    • Other hyperlipidemia    • Perirectal abscess    • Vitamin D deficiency    • Uncontrolled type 2 diabetes mellitus with hyperglycemia (CMS/HCC) Yes   • Diabetic peripheral neuropathy (CMS/HCC)    This is a 78-year-old male patient here today for a follow-up visit.  He is a copy by his wife.  He has not had recent labs done.  " He states he is taking his medications daily and consistently.  He brought his blood glucose meter but states he's been out of test strips and has not checked his blood sugars in 3 weeks.  He denies any hypoglycemic events.  He was recently seen in the emergency room for a perirectal abscess and was prescribed Bactrim.  He is currently taking this medication however he feels like it is not completely healed.  He also has a history of colon cancer according to his wife and he has not been to follow-up with a gastroenterologist.  He will be referred from today's visit.  He is complaining of anxiety.  He is requesting a prescription for Valium which used to be prescribed by his primary care provider.  Patient was informed that a controlled substance such as Valium would not be prescribed from this office.  We did discuss other options for treatment using Lexapro for anxiety.  He does not want to follow-up his primary care regarding this issue at this time.      The following portions of the patient's history were reviewed and updated as appropriate: allergies, current medications, past family history, past medical history, past social history, past surgical history and problem list.    Review of Systems   Constitutional: Negative for fatigue.   HENT: Negative for voice change.    Eyes: Negative for visual disturbance.   Cardiovascular: Negative for leg swelling.   Endocrine: Negative for polyuria.   Skin: Negative for wound.   Neurological: Negative for numbness.       Objective   Physical Exam   Constitutional: He is oriented to person, place, and time. He appears well-developed and well-nourished. No distress.   HENT:   Head: Normocephalic and atraumatic.   Right Ear: External ear normal.   Left Ear: External ear normal.   Nose: Nose normal.   Eyes: Pupils are equal, round, and reactive to light. Right eye exhibits no discharge. Left eye exhibits no discharge.   Neck: Normal range of motion. Neck supple. Carotid  bruit is not present. No tracheal deviation, no edema and no erythema present. No thyromegaly present.   Cardiovascular: Normal rate, regular rhythm, normal heart sounds and intact distal pulses. Exam reveals no gallop and no friction rub.   No murmur heard.  Pulmonary/Chest: Effort normal and breath sounds normal. No respiratory distress. He has no wheezes. He has no rales.   Abdominal: Soft. Bowel sounds are normal. He exhibits no distension. There is no tenderness.   Musculoskeletal: He exhibits no edema or deformity.   Uses cane    Diabetic foot exam performed: exam performed.   During the foot exam he had a monofilament test not performed.  Vascular Status -  His right foot exhibits normal foot vasculature  and no edema. His left foot exhibits normal foot vasculature  and no edema.  Skin Integrity  -  His right foot skin is intact.His left foot skin is intact..  Lymphadenopathy:     He has no cervical adenopathy.   Neurological: He is alert and oriented to person, place, and time. Coordination normal.   Skin: Skin is warm and dry. No rash noted. He is not diaphoretic. No erythema. No pallor.   Psychiatric: He has a normal mood and affect. His behavior is normal. Judgment and thought content normal.   Nursing note and vitals reviewed.    Results for orders placed or performed in visit on 07/27/18   Comprehensive Metabolic Panel   Result Value Ref Range    Glucose 159 (H) 65 - 99 mg/dL    BUN 23 8 - 23 mg/dL    Creatinine 1.21 0.76 - 1.27 mg/dL    eGFR Non African Am 58 (L) >60 mL/min/1.73    eGFR African Am 71 >60 mL/min/1.73    BUN/Creatinine Ratio 19.0 7.0 - 25.0    Sodium 142 136 - 145 mmol/L    Potassium 4.9 3.5 - 5.2 mmol/L    Chloride 104 98 - 107 mmol/L    Total CO2 23.0 22.0 - 29.0 mmol/L    Calcium 10.2 8.6 - 10.5 mg/dL    Total Protein 6.4 6.0 - 8.5 g/dL    Albumin 4.40 3.50 - 5.20 g/dL    Globulin 2.0 gm/dL    A/G Ratio 2.2 g/dL    Total Bilirubin 0.2 0.1 - 1.2 mg/dL    Alkaline Phosphatase 56 39 -  117 U/L    AST (SGOT) 14 1 - 40 U/L    ALT (SGPT) 11 1 - 41 U/L   C-Peptide   Result Value Ref Range    C-Peptide 3.4 1.1 - 4.4 ng/mL   Hemoglobin A1c   Result Value Ref Range    Hemoglobin A1C 7.60 (H) 4.80 - 5.60 %   Lipid Panel   Result Value Ref Range    Total Cholesterol 179 0 - 200 mg/dL    Triglycerides 254 (H) 0 - 150 mg/dL    HDL Cholesterol 40 40 - 60 mg/dL    VLDL Cholesterol 50.8 (H) 5 - 40 mg/dL    LDL Cholesterol  88 0 - 100 mg/dL   Vitamin D 25 Hydroxy   Result Value Ref Range    25 Hydroxy, Vitamin D 65.0 30.0 - 100.0 ng/ml   T3, Free   Result Value Ref Range    T3, Free 2.0 2.0 - 4.4 pg/mL   T4, Free   Result Value Ref Range    Free T4 1.28 0.93 - 1.70 ng/dL   Thyroid Panel With TSH   Result Value Ref Range    TSH 1.170 0.450 - 4.500 uIU/mL    T4, Total 5.9 4.5 - 12.0 ug/dL    T3 Uptake 29 24 - 39 %    Free Thyroxine Index 1.7 1.2 - 4.9   Unable To Void   Result Value Ref Range    Unable to Void Comment    Cardiovascular Risk Assessment   Result Value Ref Range    Interpretation Note    Diabetes Patient Education   Result Value Ref Range    PDF Image Not applicable          Assessment/Plan   Problems Addressed this Visit        Cardiovascular and Mediastinum    Hyperlipidemia    Relevant Orders    Ambulatory Referral to Gastroenterology    Comprehensive Metabolic Panel    C-Peptide    Hemoglobin A1c    Lipid Panel    MicroAlbumin, Urine, Random - Urine, Clean Catch    Vitamin D 25 Hydroxy    T3, Free    T4, Free    Thyroid Panel With TSH    Benign essential HTN    Relevant Orders    Ambulatory Referral to Gastroenterology    Comprehensive Metabolic Panel    C-Peptide    Hemoglobin A1c    Lipid Panel    MicroAlbumin, Urine, Random - Urine, Clean Catch    Vitamin D 25 Hydroxy    T3, Free    T4, Free    Thyroid Panel With TSH       Digestive    Vitamin D deficiency    Relevant Orders    Ambulatory Referral to Gastroenterology    Comprehensive Metabolic Panel    C-Peptide    Hemoglobin A1c    Lipid  Panel    MicroAlbumin, Urine, Random - Urine, Clean Catch    Vitamin D 25 Hydroxy    T3, Free    T4, Free    Thyroid Panel With TSH       Endocrine    Diabetes type 2, uncontrolled (CMS/HCC) - Primary    Relevant Orders    Ambulatory Referral to Gastroenterology    Comprehensive Metabolic Panel    C-Peptide    Hemoglobin A1c    Lipid Panel    MicroAlbumin, Urine, Random - Urine, Clean Catch    Vitamin D 25 Hydroxy    T3, Free    T4, Free    Thyroid Panel With TSH    Diabetic peripheral neuropathy (CMS/HCC)    Relevant Orders    Ambulatory Referral to Gastroenterology    Comprehensive Metabolic Panel    C-Peptide    Hemoglobin A1c    Lipid Panel    MicroAlbumin, Urine, Random - Urine, Clean Catch    Vitamin D 25 Hydroxy    T3, Free    T4, Free    Thyroid Panel With TSH       Other    Chronic fatigue    Perirectal abscess    Relevant Orders    Ambulatory Referral to Gastroenterology    Comprehensive Metabolic Panel    C-Peptide    Hemoglobin A1c    Lipid Panel    MicroAlbumin, Urine, Random - Urine, Clean Catch    Vitamin D 25 Hydroxy    T3, Free    T4, Free    Thyroid Panel With TSH    Anxiety    Relevant Orders    Ambulatory Referral to Gastroenterology    Comprehensive Metabolic Panel    C-Peptide    Hemoglobin A1c    Lipid Panel    MicroAlbumin, Urine, Random - Urine, Clean Catch    Vitamin D 25 Hydroxy    T3, Free    T4, Free    Thyroid Panel With TSH        In summary, patient was seen and examined.  Metabolically he presents stable however he will have extensive labs at today's visit will be notified of the results along with any further recommendations.  He is having problems with anxiety so he has been prescribed Lexapro 10 mg and advised to take this medication daily as prescribed.  He is requesting refills for his prescriptions to mail order.  He has also needing test strips for his meter.  He has not been checking his blood sugars over the past several weeks.  He denies having any hypoglycemic events.  His  blood pressure is an satisfactory range.  He was recently diagnosed with a perirectal abscess and has a history of colon cancer so he has been referred to gastroenterology.  He will follow-up in our office in 6 mo.

## 2019-02-02 LAB
25(OH)D3+25(OH)D2 SERPL-MCNC: 89.6 NG/ML (ref 30–100)
ALBUMIN SERPL-MCNC: 4.3 G/DL (ref 3.5–5.2)
ALBUMIN/GLOB SERPL: 2 G/DL
ALP SERPL-CCNC: 69 U/L (ref 39–117)
ALT SERPL-CCNC: 8 U/L (ref 1–41)
AST SERPL-CCNC: 12 U/L (ref 1–40)
BILIRUB SERPL-MCNC: <0.2 MG/DL (ref 0.1–1.2)
BUN SERPL-MCNC: 25 MG/DL (ref 8–23)
BUN/CREAT SERPL: 19.1 (ref 7–25)
C PEPTIDE SERPL-MCNC: 2.9 NG/ML (ref 1.1–4.4)
CALCIUM SERPL-MCNC: 9.9 MG/DL (ref 8.6–10.5)
CHLORIDE SERPL-SCNC: 102 MMOL/L (ref 98–107)
CHOLEST SERPL-MCNC: 203 MG/DL (ref 0–200)
CO2 SERPL-SCNC: 21.5 MMOL/L (ref 22–29)
CREAT SERPL-MCNC: 1.31 MG/DL (ref 0.76–1.27)
FT4I SERPL CALC-MCNC: 1.5 (ref 1.2–4.9)
GLOBULIN SER CALC-MCNC: 2.2 GM/DL
GLUCOSE SERPL-MCNC: 128 MG/DL (ref 65–99)
HBA1C MFR BLD: 7.3 % (ref 4.8–5.6)
HDLC SERPL-MCNC: 37 MG/DL (ref 40–60)
INTERPRETATION: NORMAL
LDLC SERPL CALC-MCNC: 87 MG/DL (ref 0–100)
Lab: NORMAL
POTASSIUM SERPL-SCNC: 4.4 MMOL/L (ref 3.5–5.2)
PROT SERPL-MCNC: 6.5 G/DL (ref 6–8.5)
SODIUM SERPL-SCNC: 141 MMOL/L (ref 136–145)
T3FREE SERPL-MCNC: 1.9 PG/ML (ref 2–4.4)
T3RU NFR SERPL: 28 % (ref 24–39)
T4 FREE SERPL-MCNC: 1.11 NG/DL (ref 0.93–1.7)
T4 SERPL-MCNC: 5.5 UG/DL (ref 4.5–12)
TRIGL SERPL-MCNC: 393 MG/DL (ref 0–150)
TSH SERPL DL<=0.005 MIU/L-ACNC: 1.94 UIU/ML (ref 0.45–4.5)
UNABLE TO VOID: NORMAL
VLDLC SERPL CALC-MCNC: 78.6 MG/DL (ref 5–40)

## 2019-03-08 LAB — MICROALBUMIN UR-MCNC: 9.7 UG/ML

## 2019-07-15 ENCOUNTER — OFFICE VISIT (OUTPATIENT)
Dept: FAMILY MEDICINE CLINIC | Facility: CLINIC | Age: 79
End: 2019-07-15

## 2019-07-15 VITALS
OXYGEN SATURATION: 97 % | BODY MASS INDEX: 21.38 KG/M2 | HEART RATE: 74 BPM | WEIGHT: 133 LBS | DIASTOLIC BLOOD PRESSURE: 68 MMHG | HEIGHT: 66 IN | TEMPERATURE: 98.2 F | RESPIRATION RATE: 16 BRPM | SYSTOLIC BLOOD PRESSURE: 110 MMHG

## 2019-07-15 DIAGNOSIS — I73.9 PAD (PERIPHERAL ARTERY DISEASE) (HCC): Primary | ICD-10-CM

## 2019-07-15 DIAGNOSIS — E11.65 UNCONTROLLED TYPE 2 DIABETES MELLITUS WITH HYPERGLYCEMIA (HCC): ICD-10-CM

## 2019-07-15 DIAGNOSIS — M79.604 PAIN IN BOTH LOWER EXTREMITIES: ICD-10-CM

## 2019-07-15 DIAGNOSIS — F33.1 MODERATE EPISODE OF RECURRENT MAJOR DEPRESSIVE DISORDER (HCC): ICD-10-CM

## 2019-07-15 DIAGNOSIS — N18.30 CKD (CHRONIC KIDNEY DISEASE), STAGE III (HCC): ICD-10-CM

## 2019-07-15 DIAGNOSIS — Z86.010 HISTORY OF COLON POLYPS: ICD-10-CM

## 2019-07-15 DIAGNOSIS — M79.605 PAIN IN BOTH LOWER EXTREMITIES: ICD-10-CM

## 2019-07-15 PROCEDURE — 99214 OFFICE O/P EST MOD 30 MIN: CPT | Performed by: PHYSICIAN ASSISTANT

## 2019-07-15 RX ORDER — CHLORAL HYDRATE 500 MG
CAPSULE ORAL
COMMUNITY
End: 2020-01-11

## 2019-07-15 RX ORDER — ESCITALOPRAM OXALATE 20 MG/1
20 TABLET ORAL DAILY
Qty: 90 TABLET | Refills: 0 | Status: SHIPPED | OUTPATIENT
Start: 2019-07-15 | End: 2019-07-29 | Stop reason: SDUPTHER

## 2019-07-15 NOTE — PROGRESS NOTES
Subjective   Courtney Medrano is a 79 y.o. male.     History of Present Illness   Courtney Medrano 79 y.o. male who presents today for routine follow up check and medication refills.  he has a history of   Patient Active Problem List   Diagnosis   • Anxiety disorder   • Depression   • Constipation   • Diabetes type 2, uncontrolled (CMS/HCC)   • GERD (gastroesophageal reflux disease)   • Hyperlipidemia   • Low back pain   • Prostatitis   • Pulmonary nodule   • Diabetic peripheral neuropathy (CMS/HCC)   • Chronic fatigue   • Vitamin D deficiency   • Noncompliance of patient with dietary regimen   • Uncontrolled type 2 diabetes mellitus (CMS/HCC)   • Constipation   • Leg pain   • Benign essential HTN   • Noncompliance with medications   • Perirectal abscess   • Anxiety   .  Since the last visit, he has overall felt depressed and anxious.  He has DMII and is under the care of their Endocrinologist for medical management and here for referral to vascular doc. Wife concerned about loss of appetite and low moods.  Weight about the same since 2010.  Walks 5 minutes and has leg pain bilat and has to stop and rest; this is chronic; saw vasc 2016. Weight is down since was inpatient last month for renal stones and required surgery.  Concern with picking at food and depression.  I will still update labs and update colonoscopy screen.  Will go up on Lexapro dose and see him back in few weeks.  he has been compliant with current medications have reviewed them.  The patient denies medication side effects.  Had hx colon polyps and needs f/u with LGA;   He admits to depression.  No SI/HI    Weight similar since 2010; may be down a few pounds since was inpatient [facility] for stone---  Results for orders placed or performed in visit on 03/07/19   MicroAlbumin, Urine, Random -   Result Value Ref Range    Microalbumin, Urine 9.7 Not Estab. ug/mL   last free T3 lab was slightly low in Feb--repeat  Also update CMP and CBC    CMP done  inpatient LFT in range;   A1C 6-2-19 was 8.2--see endocrine for DMII, HTN, hyperlipidemia  The following portions of the patient's history were reviewed and updated as appropriate: allergies, current medications, past family history, past medical history, past social history, past surgical history and problem list.    Review of Systems   Constitutional: Positive for appetite change, diaphoresis, fatigue and unexpected weight change. Negative for activity change.   HENT: Positive for dental problem, tinnitus and voice change. Negative for nosebleeds and trouble swallowing.    Eyes: Positive for discharge. Negative for pain and visual disturbance.   Respiratory: Negative for chest tightness, shortness of breath and wheezing.    Cardiovascular: Positive for leg swelling. Negative for chest pain and palpitations.   Gastrointestinal: Positive for constipation. Negative for abdominal pain and blood in stool.   Endocrine: Negative.    Genitourinary: Positive for difficulty urinating, dysuria, hematuria and urgency.   Musculoskeletal: Positive for arthralgias, neck pain and neck stiffness. Negative for joint swelling.   Skin: Positive for pallor. Negative for color change and rash.   Allergic/Immunologic: Negative.    Neurological: Positive for speech difficulty, weakness and headaches. Negative for syncope.   Hematological: Negative for adenopathy.   Psychiatric/Behavioral: Positive for agitation, decreased concentration and dysphoric mood. Negative for confusion. The patient is nervous/anxious.    All other systems reviewed and are negative.      Objective   Physical Exam   Constitutional: He is oriented to person, place, and time. He appears well-developed and well-nourished. No distress.   HENT:   Head: Normocephalic and atraumatic.   Eyes: Conjunctivae and EOM are normal. Pupils are equal, round, and reactive to light. Right eye exhibits no discharge. Left eye exhibits no discharge. No scleral icterus.   Neck: Normal  range of motion. Neck supple. No tracheal deviation present. No thyromegaly present.   Cardiovascular: Normal rate, regular rhythm, normal heart sounds and normal pulses. Exam reveals no gallop.   No murmur heard.  Difficult to palp pulses lower ext:  Dorsalis pedis and posterior tibia 1/2/4----hard to feel; feet are warm, not hot or cool; no hair on toes   Pulmonary/Chest: Effort normal and breath sounds normal. No respiratory distress. He has no wheezes. He has no rales.   Musculoskeletal: Normal range of motion.   Neurological: He is alert and oriented to person, place, and time. He exhibits normal muscle tone. Coordination normal.   Skin: Skin is warm. No rash noted. No erythema. No pallor.   Psychiatric: His behavior is normal.   Nursing note and vitals reviewed.      Assessment/Plan   Courtney was seen today for surgery follow up.    Diagnoses and all orders for this visit:    Uncontrolled type 2 diabetes mellitus with hyperglycemia (CMS/HCC)  -     Comprehensive Metabolic Panel  -     CBC & Differential  -     T3, Free  -     T4, Free  -     TSH    PAD (peripheral artery disease) (CMS/HCC)  -     Comprehensive Metabolic Panel  -     CBC & Differential  -     T3, Free  -     T4, Free  -     TSH  -     Ambulatory Referral to Vascular Surgery    Moderate episode of recurrent major depressive disorder (CMS/HCC)  -     Comprehensive Metabolic Panel  -     CBC & Differential  -     T3, Free  -     T4, Free  -     TSH    History of colon polyps  -     Ambulatory Referral to Gastroenterology    Other orders  -     escitalopram (LEXAPRO) 20 MG tablet; Take 1 tablet by mouth Daily. For depression; new dose    go up on Lexapro for depression and anxiety;  Also get thyroid labs, CBC, CMP today--watching weight --eating less; recent surgery--renal stones; see me in few weeks f/u on this also and going up on Lexapro  Needs colonoscopy d/t hx colon polyps--LGA  Refer to vascular doc for PAD and has pain  F/u endocrine for  DMII

## 2019-07-17 LAB
ALBUMIN SERPL-MCNC: 4.4 G/DL (ref 3.5–5.2)
ALBUMIN/GLOB SERPL: 3.1 G/DL
ALP SERPL-CCNC: 63 U/L (ref 39–117)
ALT SERPL-CCNC: 7 U/L (ref 1–41)
AST SERPL-CCNC: 10 U/L (ref 1–40)
BASOPHILS # BLD AUTO: 0.06 10*3/MM3 (ref 0–0.2)
BASOPHILS NFR BLD AUTO: 0.9 % (ref 0–1.5)
BILIRUB SERPL-MCNC: 0.3 MG/DL (ref 0.2–1.2)
BUN SERPL-MCNC: 25 MG/DL (ref 8–23)
BUN/CREAT SERPL: 17.6 (ref 7–25)
CALCIUM SERPL-MCNC: 9.2 MG/DL (ref 8.6–10.5)
CHLORIDE SERPL-SCNC: 103 MMOL/L (ref 98–107)
CO2 SERPL-SCNC: 23.8 MMOL/L (ref 22–29)
CREAT SERPL-MCNC: 1.42 MG/DL (ref 0.76–1.27)
EOSINOPHIL # BLD AUTO: 0.19 10*3/MM3 (ref 0–0.4)
EOSINOPHIL NFR BLD AUTO: 2.8 % (ref 0.3–6.2)
ERYTHROCYTE [DISTWIDTH] IN BLOOD BY AUTOMATED COUNT: 14.4 % (ref 12.3–15.4)
GLOBULIN SER CALC-MCNC: 1.4 GM/DL
GLUCOSE SERPL-MCNC: 141 MG/DL (ref 65–99)
HCT VFR BLD AUTO: 36.4 % (ref 37.5–51)
HGB BLD-MCNC: 11.2 G/DL (ref 13–17.7)
IMM GRANULOCYTES # BLD AUTO: 0.01 10*3/MM3 (ref 0–0.05)
IMM GRANULOCYTES NFR BLD AUTO: 0.1 % (ref 0–0.5)
LYMPHOCYTES # BLD AUTO: 2.04 10*3/MM3 (ref 0.7–3.1)
LYMPHOCYTES NFR BLD AUTO: 30.2 % (ref 19.6–45.3)
MCH RBC QN AUTO: 30.2 PG (ref 26.6–33)
MCHC RBC AUTO-ENTMCNC: 30.8 G/DL (ref 31.5–35.7)
MCV RBC AUTO: 98.1 FL (ref 79–97)
MONOCYTES # BLD AUTO: 0.51 10*3/MM3 (ref 0.1–0.9)
MONOCYTES NFR BLD AUTO: 7.5 % (ref 5–12)
NEUTROPHILS # BLD AUTO: 3.95 10*3/MM3 (ref 1.7–7)
NEUTROPHILS NFR BLD AUTO: 58.5 % (ref 42.7–76)
NRBC BLD AUTO-RTO: 0 /100 WBC (ref 0–0.2)
PLATELET # BLD AUTO: 220 10*3/MM3 (ref 140–450)
POTASSIUM SERPL-SCNC: 4.6 MMOL/L (ref 3.5–5.2)
PROT SERPL-MCNC: 5.8 G/DL (ref 6–8.5)
RBC # BLD AUTO: 3.71 10*6/MM3 (ref 4.14–5.8)
SODIUM SERPL-SCNC: 141 MMOL/L (ref 136–145)
T3FREE SERPL-MCNC: 2.3 PG/ML (ref 2–4.4)
T4 FREE SERPL-MCNC: 1.21 NG/DL (ref 0.93–1.7)
TSH SERPL DL<=0.005 MIU/L-ACNC: 1.5 MIU/ML (ref 0.27–4.2)
WBC # BLD AUTO: 6.76 10*3/MM3 (ref 3.4–10.8)

## 2019-07-19 LAB
FOLATE SERPL-MCNC: 6.3 NG/ML (ref 4.78–24.2)
IRON SATN MFR SERPL: 14 % (ref 20–50)
IRON SERPL-MCNC: 65 MCG/DL (ref 59–158)
Lab: NORMAL
TIBC SERPL-MCNC: 448 MCG/DL
UIBC SERPL-MCNC: 383 MCG/DL (ref 112–346)
VIT B12 SERPL-MCNC: 695 PG/ML (ref 211–946)
WRITTEN AUTHORIZATION: NORMAL

## 2019-07-29 ENCOUNTER — OFFICE VISIT (OUTPATIENT)
Dept: FAMILY MEDICINE CLINIC | Facility: CLINIC | Age: 79
End: 2019-07-29

## 2019-07-29 VITALS
TEMPERATURE: 98.5 F | HEIGHT: 66 IN | SYSTOLIC BLOOD PRESSURE: 128 MMHG | DIASTOLIC BLOOD PRESSURE: 78 MMHG | OXYGEN SATURATION: 98 % | RESPIRATION RATE: 16 BRPM | BODY MASS INDEX: 21.69 KG/M2 | WEIGHT: 135 LBS | HEART RATE: 68 BPM

## 2019-07-29 DIAGNOSIS — I73.9 PAD (PERIPHERAL ARTERY DISEASE) (HCC): ICD-10-CM

## 2019-07-29 DIAGNOSIS — F41.1 GENERALIZED ANXIETY DISORDER: ICD-10-CM

## 2019-07-29 DIAGNOSIS — E11.65 UNCONTROLLED TYPE 2 DIABETES MELLITUS WITH HYPERGLYCEMIA (HCC): ICD-10-CM

## 2019-07-29 DIAGNOSIS — F33.0 MILD EPISODE OF RECURRENT MAJOR DEPRESSIVE DISORDER (HCC): Primary | ICD-10-CM

## 2019-07-29 DIAGNOSIS — D63.8 ANEMIA OF CHRONIC DISEASE: ICD-10-CM

## 2019-07-29 DIAGNOSIS — E78.49 OTHER HYPERLIPIDEMIA: ICD-10-CM

## 2019-07-29 DIAGNOSIS — D64.9 LOW HEMOGLOBIN: ICD-10-CM

## 2019-07-29 DIAGNOSIS — K21.00 GASTROESOPHAGEAL REFLUX DISEASE WITH ESOPHAGITIS: ICD-10-CM

## 2019-07-29 DIAGNOSIS — K63.5 POLYP OF COLON, UNSPECIFIED PART OF COLON, UNSPECIFIED TYPE: ICD-10-CM

## 2019-07-29 DIAGNOSIS — N18.30 CKD (CHRONIC KIDNEY DISEASE) STAGE 3, GFR 30-59 ML/MIN (HCC): ICD-10-CM

## 2019-07-29 PROCEDURE — 99214 OFFICE O/P EST MOD 30 MIN: CPT | Performed by: PHYSICIAN ASSISTANT

## 2019-07-29 RX ORDER — ESCITALOPRAM OXALATE 20 MG/1
20 TABLET ORAL DAILY
Qty: 90 TABLET | Refills: 1 | Status: SHIPPED | OUTPATIENT
Start: 2019-07-29 | End: 2019-10-22 | Stop reason: SDUPTHER

## 2019-07-29 NOTE — PROGRESS NOTES
Subjective   Courtney Medrano is a 79 y.o. male.     History of Present Illness   Courtney Medrano male 79 y.o. who presents today for follow up of Depression and Anxiety.  He reports some help with going up on the Lexapro with depression.  Wife here and can tell it is helping.  He wants to stay on this and not change yet---some help.. Onset of symptoms was approximately several years ago.  He denies current suicidal and homicidal ideation. Risk factors are family history of anxiety and or depression, chronic illness and chronic pain.  Previous treatment includes current Rx.  He complains of the following medication side effects: none.  The patient declines to go to counseling..    I did do labs and d/t renal functions---referred to nephrologist----this was due to   Lab Results   Component Value Date    GLUCOSE 149 (H) 06/02/2017    BUN 25 (H) 07/16/2019    CREATININE 1.42 (H) 07/16/2019    EGFRIFNONA 48 (L) 07/16/2019    EGFRIFAFRI 59 (L) 07/16/2019    BCR 17.6 07/16/2019    K 4.6 07/16/2019    CO2 23.8 07/16/2019    CALCIUM 9.2 07/16/2019    PROTENTOTREF 5.8 (L) 07/16/2019    ALBUMIN 4.40 07/16/2019    LABIL2 3.1 07/16/2019    AST 10 07/16/2019    ALT 7 07/16/2019      Lab Results   Component Value Date    WBC 6.76 07/16/2019    HGB 11.2 (L) 07/16/2019    HCT 36.4 (L) 07/16/2019    MCV 98.1 (H) 07/16/2019     07/16/2019     Then I added iron profile--serum iron was normal but sat lower range TIBC upper range---concern about mild iron def but also anemia of chronic disease----with DMII---see nephrologist-----I do want him to see DR Babin---suspect his eating changed but I still want to consider EGD with the needed colonoscopy  DR Babin; do have him on MVI for iron stores and lower range folic acid.; hgb 14.2 in June; I will get CBC today  Still want him to see vascular doc for re eval PAD.  Sees endocrine for the DMII and lipids  He does have GERD and takes antiacids--no help Prilosec----another reason to see  GI--DR Babin  Weight is up a little    The following portions of the patient's history were reviewed and updated as appropriate: allergies, current medications, past family history, past medical history, past social history, past surgical history and problem list.    Review of Systems   Constitutional: Negative for activity change, appetite change and unexpected weight change.   HENT: Positive for tinnitus. Negative for nosebleeds and trouble swallowing.    Eyes: Negative for pain and visual disturbance.   Respiratory: Negative for chest tightness, shortness of breath and wheezing.    Cardiovascular: Negative for chest pain and palpitations.   Gastrointestinal: Negative for abdominal pain and blood in stool.   Endocrine: Negative.    Genitourinary: Positive for difficulty urinating, dysuria and enuresis. Negative for hematuria.   Musculoskeletal: Negative for joint swelling.   Skin: Negative for color change and rash.   Allergic/Immunologic: Negative.    Neurological: Positive for headaches. Negative for syncope and speech difficulty.   Hematological: Negative for adenopathy.   Psychiatric/Behavioral: Negative for agitation and confusion.   All other systems reviewed and are negative.      Objective   Physical Exam   Constitutional: He is oriented to person, place, and time. He appears well-developed and well-nourished. No distress.   HENT:   Head: Normocephalic and atraumatic.   Eyes: Conjunctivae and EOM are normal. Pupils are equal, round, and reactive to light. Right eye exhibits no discharge. Left eye exhibits no discharge. No scleral icterus.   Neck: Normal range of motion. Neck supple. No tracheal deviation present. No thyromegaly present.   Cardiovascular: Normal rate, regular rhythm, normal heart sounds, intact distal pulses and normal pulses. Exam reveals no gallop.   No murmur heard.  Pulmonary/Chest: Effort normal and breath sounds normal. No respiratory distress. He has no wheezes. He has no rales.    Musculoskeletal: Normal range of motion.   Neurological: He is alert and oriented to person, place, and time. He exhibits normal muscle tone. Coordination normal.   Skin: Skin is warm. No rash noted. No erythema. No pallor.   Psychiatric: He has a normal mood and affect. His behavior is normal. Judgment and thought content normal.   Nursing note and vitals reviewed.      Assessment/Plan   Courtney was seen today for follow-up.    Diagnoses and all orders for this visit:    Mild episode of recurrent major depressive disorder (CMS/[facility])    Generalized anxiety disorder    Other hyperlipidemia    Gastroesophageal reflux disease with esophagitis  -     Ambulatory Referral to Gastroenterology    Low hemoglobin  -     Ambulatory Referral to Gastroenterology  -     CBC & Differential  -     Basic Metabolic Panel  -     Ferritin    Anemia of chronic disease  -     Ambulatory Referral to Nephrology  -     Ambulatory Referral to Gastroenterology  -     CBC & Differential  -     Basic Metabolic Panel  -     Ferritin    CKD (chronic kidney disease) stage 3, GFR 30-59 ml/min (CMS/HCC)  -     Ambulatory Referral to Nephrology  -     CBC & Differential  -     Basic Metabolic Panel  -     Ferritin    Uncontrolled type 2 diabetes mellitus with hyperglycemia ([facility])  -     Ambulatory Referral to Nephrology    Polyp of colon, unspecified part of colon, unspecified type  -     Ambulatory Referral to Gastroenterology    PAD (peripheral artery disease) (CMS/HCC)    Other orders  -     escitalopram (LEXAPRO) 20 MG tablet; Take 1 tablet by mouth Daily. For depression;        I did do labs and d/t renal functions---referred to nephrologist----this was due to Then I added iron profile--serum iron was normal but sat lower range TIBC upper range---concern about mild iron def but also anemia of chronic disease----with DMII---see nephrologist-----I do want him to see DR Babin---suspect his eating changed but I still want to consider EGD with the  needed colonoscopy  DR Babin; do have him on MVI for iron stores and lower range folic acid.; hgb 14.2 in June; I will get CBC today  Still want him to see vascular doc for re eval PAD.  Sees endocrine for the DMII and lipids  He does have GERD and takes antiacids--no help Prilosec----another reason to see GI--DR Babin  Weight is up a little=---better

## 2019-07-30 LAB
BASOPHILS # BLD AUTO: 0.04 10*3/MM3 (ref 0–0.2)
BASOPHILS NFR BLD AUTO: 0.6 % (ref 0–1.5)
BUN SERPL-MCNC: 25 MG/DL (ref 8–23)
BUN/CREAT SERPL: 16.8 (ref 7–25)
CALCIUM SERPL-MCNC: 9.7 MG/DL (ref 8.6–10.5)
CHLORIDE SERPL-SCNC: 103 MMOL/L (ref 98–107)
CO2 SERPL-SCNC: 23.8 MMOL/L (ref 22–29)
CREAT SERPL-MCNC: 1.49 MG/DL (ref 0.76–1.27)
EOSINOPHIL # BLD AUTO: 0.11 10*3/MM3 (ref 0–0.4)
EOSINOPHIL NFR BLD AUTO: 1.7 % (ref 0.3–6.2)
ERYTHROCYTE [DISTWIDTH] IN BLOOD BY AUTOMATED COUNT: 14.5 % (ref 12.3–15.4)
FERRITIN SERPL-MCNC: 20.2 NG/ML (ref 30–400)
GLUCOSE SERPL-MCNC: 133 MG/DL (ref 65–99)
HCT VFR BLD AUTO: 39 % (ref 37.5–51)
HGB BLD-MCNC: 11.9 G/DL (ref 13–17.7)
IMM GRANULOCYTES # BLD AUTO: 0.02 10*3/MM3 (ref 0–0.05)
IMM GRANULOCYTES NFR BLD AUTO: 0.3 % (ref 0–0.5)
LYMPHOCYTES # BLD AUTO: 2.18 10*3/MM3 (ref 0.7–3.1)
LYMPHOCYTES NFR BLD AUTO: 33 % (ref 19.6–45.3)
MCH RBC QN AUTO: 30.2 PG (ref 26.6–33)
MCHC RBC AUTO-ENTMCNC: 30.5 G/DL (ref 31.5–35.7)
MCV RBC AUTO: 99 FL (ref 79–97)
MONOCYTES # BLD AUTO: 0.48 10*3/MM3 (ref 0.1–0.9)
MONOCYTES NFR BLD AUTO: 7.3 % (ref 5–12)
NEUTROPHILS # BLD AUTO: 3.77 10*3/MM3 (ref 1.7–7)
NEUTROPHILS NFR BLD AUTO: 57.1 % (ref 42.7–76)
NRBC BLD AUTO-RTO: 0 /100 WBC (ref 0–0.2)
PLATELET # BLD AUTO: 239 10*3/MM3 (ref 140–450)
POTASSIUM SERPL-SCNC: 5.1 MMOL/L (ref 3.5–5.2)
RBC # BLD AUTO: 3.94 10*6/MM3 (ref 4.14–5.8)
SODIUM SERPL-SCNC: 141 MMOL/L (ref 136–145)
WBC # BLD AUTO: 6.6 10*3/MM3 (ref 3.4–10.8)

## 2019-07-30 RX ORDER — FERROUS SULFATE 325(65) MG
TABLET ORAL
Qty: 90 TABLET | Refills: 0 | OUTPATIENT
Start: 2019-07-30 | End: 2020-01-11

## 2019-07-30 NOTE — PROGRESS NOTES
"Spoke to pt and advised he states he is taking Excedrin daily and \"will try to stop\" but he said his is unsure if he can.    He is picking up FOBT and lab results and knows Dr. Babin's office will be calling"

## 2019-08-15 DIAGNOSIS — Z91.119 NONCOMPLIANCE OF PATIENT WITH DIETARY REGIMEN: ICD-10-CM

## 2019-08-15 DIAGNOSIS — IMO0001 UNCONTROLLED DIABETES MELLITUS TYPE 2 WITHOUT COMPLICATIONS: ICD-10-CM

## 2019-08-15 DIAGNOSIS — E78.49 OTHER HYPERLIPIDEMIA: ICD-10-CM

## 2019-08-15 DIAGNOSIS — E55.9 VITAMIN D DEFICIENCY: ICD-10-CM

## 2019-08-15 DIAGNOSIS — E11.42 DIABETIC PERIPHERAL NEUROPATHY (HCC): ICD-10-CM

## 2019-08-15 DIAGNOSIS — Z91.14 NONCOMPLIANCE WITH MEDICATIONS: ICD-10-CM

## 2019-08-15 DIAGNOSIS — N41.9 PROSTATITIS, UNSPECIFIED PROSTATITIS TYPE: ICD-10-CM

## 2019-08-15 DIAGNOSIS — I10 BENIGN ESSENTIAL HTN: ICD-10-CM

## 2019-08-15 RX ORDER — GLIMEPIRIDE 4 MG/1
8 TABLET ORAL
Qty: 180 TABLET | Refills: 1 | OUTPATIENT
Start: 2019-08-15

## 2019-08-15 RX ORDER — SIMVASTATIN 40 MG
TABLET ORAL
Qty: 90 TABLET | Refills: 1 | OUTPATIENT
Start: 2019-08-15

## 2019-08-15 RX ORDER — LISINOPRIL 10 MG/1
TABLET ORAL
Qty: 90 TABLET | Refills: 1 | OUTPATIENT
Start: 2019-08-15

## 2019-08-27 DIAGNOSIS — IMO0001 UNCONTROLLED DIABETES MELLITUS TYPE 2 WITHOUT COMPLICATIONS: ICD-10-CM

## 2019-08-27 DIAGNOSIS — Z91.14 NONCOMPLIANCE WITH MEDICATIONS: ICD-10-CM

## 2019-08-27 DIAGNOSIS — Z91.119 NONCOMPLIANCE OF PATIENT WITH DIETARY REGIMEN: ICD-10-CM

## 2019-08-27 DIAGNOSIS — E55.9 VITAMIN D DEFICIENCY: ICD-10-CM

## 2019-08-27 DIAGNOSIS — I10 BENIGN ESSENTIAL HTN: ICD-10-CM

## 2019-08-27 DIAGNOSIS — E78.49 OTHER HYPERLIPIDEMIA: ICD-10-CM

## 2019-08-27 DIAGNOSIS — N41.9 PROSTATITIS, UNSPECIFIED PROSTATITIS TYPE: ICD-10-CM

## 2019-08-27 DIAGNOSIS — E11.42 DIABETIC PERIPHERAL NEUROPATHY (HCC): ICD-10-CM

## 2019-08-27 RX ORDER — LISINOPRIL 10 MG/1
10 TABLET ORAL DAILY
Qty: 90 TABLET | Refills: 1 | Status: ON HOLD | OUTPATIENT
Start: 2019-08-27 | End: 2020-01-18 | Stop reason: SDUPTHER

## 2019-08-27 RX ORDER — GLIMEPIRIDE 4 MG/1
8 TABLET ORAL
Qty: 180 TABLET | Refills: 0 | Status: SHIPPED | OUTPATIENT
Start: 2019-08-27 | End: 2020-02-11 | Stop reason: SDUPTHER

## 2019-08-27 RX ORDER — SIMVASTATIN 40 MG
40 TABLET ORAL NIGHTLY
Qty: 90 TABLET | Refills: 1 | Status: SHIPPED | OUTPATIENT
Start: 2019-08-27 | End: 2020-02-11 | Stop reason: SDUPTHER

## 2019-08-30 ENCOUNTER — RESULTS ENCOUNTER (OUTPATIENT)
Dept: FAMILY MEDICINE CLINIC | Facility: CLINIC | Age: 79
End: 2019-08-30

## 2019-08-30 DIAGNOSIS — D64.9 LOW HEMOGLOBIN: ICD-10-CM

## 2019-08-30 DIAGNOSIS — D63.8 ANEMIA OF CHRONIC DISEASE: ICD-10-CM

## 2019-09-24 DIAGNOSIS — D64.9 LOW HEMOGLOBIN: Primary | ICD-10-CM

## 2019-09-24 LAB
DEVELOPER EXPIRATION DATE: NORMAL
DEVELOPER LOT NUMBER: NORMAL
EXPIRATION DATE: NORMAL
FECAL OCCULT BLOOD SCREEN, POC: NEGATIVE
Lab: NORMAL
NEGATIVE CONTROL: NEGATIVE
POSITIVE CONTROL: POSITIVE

## 2019-09-24 PROCEDURE — 82270 OCCULT BLOOD FECES: CPT | Performed by: PHYSICIAN ASSISTANT

## 2019-10-23 RX ORDER — ESCITALOPRAM OXALATE 20 MG/1
TABLET ORAL
Qty: 90 TABLET | Refills: 0 | Status: SHIPPED | OUTPATIENT
Start: 2019-10-23 | End: 2020-02-11 | Stop reason: SDUPTHER

## 2019-10-24 ENCOUNTER — TELEPHONE (OUTPATIENT)
Dept: FAMILY MEDICINE CLINIC | Facility: CLINIC | Age: 79
End: 2019-10-24

## 2019-10-24 NOTE — TELEPHONE ENCOUNTER
Pt's wife wanted you to know that pt is having echo done due to low heart rate before being cleared for surgery.

## 2019-11-18 ENCOUNTER — OFFICE (OUTPATIENT)
Dept: URBAN - METROPOLITAN AREA CLINIC 2 | Facility: CLINIC | Age: 79
End: 2019-11-18

## 2019-11-18 VITALS
SYSTOLIC BLOOD PRESSURE: 142 MMHG | HEART RATE: 68 BPM | OXYGEN SATURATION: 98 % | WEIGHT: 134 LBS | HEIGHT: 67 IN | DIASTOLIC BLOOD PRESSURE: 70 MMHG

## 2019-11-18 DIAGNOSIS — I10 ESSENTIAL (PRIMARY) HYPERTENSION: ICD-10-CM

## 2019-11-18 DIAGNOSIS — D50.9 IRON DEFICIENCY ANEMIA, UNSPECIFIED: ICD-10-CM

## 2019-11-18 DIAGNOSIS — K21.9 GASTRO-ESOPHAGEAL REFLUX DISEASE WITHOUT ESOPHAGITIS: ICD-10-CM

## 2019-11-18 DIAGNOSIS — E11.9 TYPE 2 DIABETES MELLITUS WITHOUT COMPLICATIONS: ICD-10-CM

## 2019-11-18 DIAGNOSIS — Z86.010 PERSONAL HISTORY OF COLONIC POLYPS: ICD-10-CM

## 2019-11-18 DIAGNOSIS — R63.4 ABNORMAL WEIGHT LOSS: ICD-10-CM

## 2019-11-18 PROCEDURE — 99204 OFFICE O/P NEW MOD 45 MIN: CPT | Performed by: INTERNAL MEDICINE

## 2019-11-18 NOTE — SERVICENOTES
records reviewed.  TSH 1.5.  Serum iron 65.  Iron saturation low at 14.  Ferritin 20.  B12 folate within normal limits.  Hemoglobin 11.2.  Hematocrit 36.4.  MCV 98.1.  Hepatic function panel within normal limits.

## 2019-11-18 NOTE — SERVICEHPINOTES
Thank you very much for referring Mister Costello for evaluation. As you know he is a 79-year-old gentleman who does have a history of polyps, Dr. Bridges did a colonoscopy and polypectomy on him in . He had 2 tubular adenomas. He never followed up and is past due for colonoscopy. He has no lower GI complaints. His bowels are regular. There is no diarrhea, constipation or bleeding. However he tells me his mother  of colon cancer at the age of 37. She  back in the 50s and I'm not sure if that is the accurate diagnosis or not.Also as you know he is anemic. His ferritin is 20 his iron saturation is low serum iron 65. He also has reflux but he says he only has situational reflux and uses over-the-counter antacids about once a week. There is no dysphagia, odynophagia nausea or vomiting. There is no melena or hematemesis. He smokes 2 cigars a day. He doesn't drink. He also reports weight loss. He says he doesn't have a good appetite needs lost 30 pounds but then he tells me he started losing weight in  at the time of his back surgery. He is in no distress, he does not look acutely ill.

## 2019-12-02 DIAGNOSIS — E78.49 OTHER HYPERLIPIDEMIA: Primary | ICD-10-CM

## 2019-12-02 DIAGNOSIS — E55.9 VITAMIN D DEFICIENCY: ICD-10-CM

## 2019-12-02 DIAGNOSIS — E11.65 UNCONTROLLED TYPE 2 DIABETES MELLITUS WITH HYPERGLYCEMIA (HCC): ICD-10-CM

## 2019-12-02 DIAGNOSIS — R53.82 CHRONIC FATIGUE: ICD-10-CM

## 2019-12-26 VITALS
SYSTOLIC BLOOD PRESSURE: 142 MMHG | WEIGHT: 134 LBS | SYSTOLIC BLOOD PRESSURE: 103 MMHG | SYSTOLIC BLOOD PRESSURE: 88 MMHG | SYSTOLIC BLOOD PRESSURE: 137 MMHG | OXYGEN SATURATION: 96 % | DIASTOLIC BLOOD PRESSURE: 78 MMHG | DIASTOLIC BLOOD PRESSURE: 96 MMHG | SYSTOLIC BLOOD PRESSURE: 211 MMHG | DIASTOLIC BLOOD PRESSURE: 75 MMHG | DIASTOLIC BLOOD PRESSURE: 53 MMHG | OXYGEN SATURATION: 100 % | SYSTOLIC BLOOD PRESSURE: 155 MMHG | DIASTOLIC BLOOD PRESSURE: 65 MMHG | HEIGHT: 67 IN | RESPIRATION RATE: 20 BRPM | RESPIRATION RATE: 16 BRPM | SYSTOLIC BLOOD PRESSURE: 163 MMHG | HEART RATE: 72 BPM | SYSTOLIC BLOOD PRESSURE: 99 MMHG | DIASTOLIC BLOOD PRESSURE: 91 MMHG | DIASTOLIC BLOOD PRESSURE: 79 MMHG | OXYGEN SATURATION: 98 % | DIASTOLIC BLOOD PRESSURE: 55 MMHG | HEART RATE: 71 BPM | RESPIRATION RATE: 13 BRPM | HEART RATE: 69 BPM | DIASTOLIC BLOOD PRESSURE: 57 MMHG | OXYGEN SATURATION: 99 % | SYSTOLIC BLOOD PRESSURE: 186 MMHG | DIASTOLIC BLOOD PRESSURE: 86 MMHG | HEART RATE: 65 BPM | DIASTOLIC BLOOD PRESSURE: 69 MMHG | SYSTOLIC BLOOD PRESSURE: 96 MMHG | RESPIRATION RATE: 7 BRPM | SYSTOLIC BLOOD PRESSURE: 123 MMHG | HEART RATE: 64 BPM | DIASTOLIC BLOOD PRESSURE: 70 MMHG | TEMPERATURE: 96.6 F | OXYGEN SATURATION: 94 % | RESPIRATION RATE: 18 BRPM | RESPIRATION RATE: 11 BRPM | DIASTOLIC BLOOD PRESSURE: 68 MMHG | SYSTOLIC BLOOD PRESSURE: 167 MMHG | DIASTOLIC BLOOD PRESSURE: 62 MMHG | HEART RATE: 63 BPM | HEART RATE: 67 BPM | SYSTOLIC BLOOD PRESSURE: 117 MMHG | TEMPERATURE: 97.5 F | HEART RATE: 80 BPM | SYSTOLIC BLOOD PRESSURE: 119 MMHG | HEART RATE: 62 BPM | SYSTOLIC BLOOD PRESSURE: 86 MMHG | HEART RATE: 57 BPM | DIASTOLIC BLOOD PRESSURE: 87 MMHG | DIASTOLIC BLOOD PRESSURE: 51 MMHG | SYSTOLIC BLOOD PRESSURE: 138 MMHG

## 2019-12-27 ENCOUNTER — OFFICE (OUTPATIENT)
Dept: URBAN - METROPOLITAN AREA PATHOLOGY 4 | Facility: PATHOLOGY | Age: 79
End: 2019-12-27
Payer: MEDICARE

## 2019-12-27 ENCOUNTER — AMBULATORY SURGICAL CENTER (OUTPATIENT)
Dept: URBAN - METROPOLITAN AREA SURGERY 17 | Facility: SURGERY | Age: 79
End: 2019-12-27
Payer: MEDICARE

## 2019-12-27 DIAGNOSIS — K21.9 GASTRO-ESOPHAGEAL REFLUX DISEASE WITHOUT ESOPHAGITIS: ICD-10-CM

## 2019-12-27 DIAGNOSIS — K44.9 DIAPHRAGMATIC HERNIA WITHOUT OBSTRUCTION OR GANGRENE: ICD-10-CM

## 2019-12-27 DIAGNOSIS — D12.5 BENIGN NEOPLASM OF SIGMOID COLON: ICD-10-CM

## 2019-12-27 DIAGNOSIS — K22.2 ESOPHAGEAL OBSTRUCTION: ICD-10-CM

## 2019-12-27 DIAGNOSIS — Z86.010 PERSONAL HISTORY OF COLONIC POLYPS: ICD-10-CM

## 2019-12-27 DIAGNOSIS — D12.2 BENIGN NEOPLASM OF ASCENDING COLON: ICD-10-CM

## 2019-12-27 DIAGNOSIS — D50.9 IRON DEFICIENCY ANEMIA, UNSPECIFIED: ICD-10-CM

## 2019-12-27 DIAGNOSIS — K29.70 GASTRITIS, UNSPECIFIED, WITHOUT BLEEDING: ICD-10-CM

## 2019-12-27 DIAGNOSIS — K31.89 OTHER DISEASES OF STOMACH AND DUODENUM: ICD-10-CM

## 2019-12-27 DIAGNOSIS — R63.4 ABNORMAL WEIGHT LOSS: ICD-10-CM

## 2019-12-27 DIAGNOSIS — D12.3 BENIGN NEOPLASM OF TRANSVERSE COLON: ICD-10-CM

## 2019-12-27 LAB
GI HISTOLOGY: A. UNSPECIFIED: (no result)
GI HISTOLOGY: B. UNSPECIFIED: (no result)
GI HISTOLOGY: C. UNSPECIFIED: (no result)
GI HISTOLOGY: D. SELECT: (no result)
GI HISTOLOGY: E. UNSPECIFIED: (no result)
GI HISTOLOGY: PDF REPORT: (no result)

## 2019-12-27 PROCEDURE — 88305 TISSUE EXAM BY PATHOLOGIST: CPT | Performed by: INTERNAL MEDICINE

## 2019-12-27 PROCEDURE — 43239 EGD BIOPSY SINGLE/MULTIPLE: CPT | Performed by: INTERNAL MEDICINE

## 2019-12-27 PROCEDURE — 45385 COLONOSCOPY W/LESION REMOVAL: CPT | Performed by: INTERNAL MEDICINE

## 2020-01-02 ENCOUNTER — RESULTS ENCOUNTER (OUTPATIENT)
Dept: ENDOCRINOLOGY | Age: 80
End: 2020-01-02

## 2020-01-02 DIAGNOSIS — R53.82 CHRONIC FATIGUE: ICD-10-CM

## 2020-01-02 DIAGNOSIS — E11.65 UNCONTROLLED TYPE 2 DIABETES MELLITUS WITH HYPERGLYCEMIA (HCC): ICD-10-CM

## 2020-01-02 DIAGNOSIS — E78.49 OTHER HYPERLIPIDEMIA: ICD-10-CM

## 2020-01-02 DIAGNOSIS — E55.9 VITAMIN D DEFICIENCY: ICD-10-CM

## 2020-01-11 ENCOUNTER — HOSPITAL ENCOUNTER (INPATIENT)
Facility: HOSPITAL | Age: 80
LOS: 7 days | Discharge: NURSING, REHAB OR LONG-TERM CARE | End: 2020-01-18
Attending: EMERGENCY MEDICINE | Admitting: UROLOGY

## 2020-01-11 ENCOUNTER — ANESTHESIA EVENT (OUTPATIENT)
Dept: PERIOP | Facility: HOSPITAL | Age: 80
End: 2020-01-11

## 2020-01-11 ENCOUNTER — HOSPITAL ENCOUNTER (EMERGENCY)
Facility: HOSPITAL | Age: 80
Discharge: HOME | End: 2020-01-11
Attending: EMERGENCY MEDICINE | Admitting: EMERGENCY MEDICINE

## 2020-01-11 ENCOUNTER — APPOINTMENT (OUTPATIENT)
Dept: GENERAL RADIOLOGY | Facility: HOSPITAL | Age: 80
End: 2020-01-11

## 2020-01-11 ENCOUNTER — APPOINTMENT (OUTPATIENT)
Dept: CT IMAGING | Facility: HOSPITAL | Age: 80
End: 2020-01-11

## 2020-01-11 ENCOUNTER — ANESTHESIA (OUTPATIENT)
Dept: PERIOP | Facility: HOSPITAL | Age: 80
End: 2020-01-11

## 2020-01-11 VITALS
TEMPERATURE: 99.4 F | SYSTOLIC BLOOD PRESSURE: 136 MMHG | HEART RATE: 72 BPM | OXYGEN SATURATION: 97 % | DIASTOLIC BLOOD PRESSURE: 70 MMHG | BODY MASS INDEX: 20.26 KG/M2 | RESPIRATION RATE: 16 BRPM | WEIGHT: 129.1 LBS | HEIGHT: 67 IN

## 2020-01-11 DIAGNOSIS — IMO0001 UNCONTROLLED DIABETES MELLITUS TYPE 2 WITHOUT COMPLICATIONS: ICD-10-CM

## 2020-01-11 DIAGNOSIS — N39.0 URINARY TRACT INFECTION IN MALE: Primary | ICD-10-CM

## 2020-01-11 DIAGNOSIS — E11.42 DIABETIC PERIPHERAL NEUROPATHY (HCC): ICD-10-CM

## 2020-01-11 DIAGNOSIS — N12 PYELONEPHRITIS: Primary | ICD-10-CM

## 2020-01-11 DIAGNOSIS — N41.9 PROSTATITIS, UNSPECIFIED PROSTATITIS TYPE: ICD-10-CM

## 2020-01-11 DIAGNOSIS — R53.1 GENERALIZED WEAKNESS: ICD-10-CM

## 2020-01-11 DIAGNOSIS — I10 BENIGN ESSENTIAL HTN: ICD-10-CM

## 2020-01-11 DIAGNOSIS — Z91.14 NONCOMPLIANCE WITH MEDICATIONS: ICD-10-CM

## 2020-01-11 DIAGNOSIS — N17.9 ACUTE KIDNEY INJURY (HCC): ICD-10-CM

## 2020-01-11 DIAGNOSIS — E78.49 OTHER HYPERLIPIDEMIA: ICD-10-CM

## 2020-01-11 DIAGNOSIS — E55.9 VITAMIN D DEFICIENCY: ICD-10-CM

## 2020-01-11 DIAGNOSIS — E87.20 LACTIC ACIDOSIS: ICD-10-CM

## 2020-01-11 DIAGNOSIS — Z91.119 NONCOMPLIANCE OF PATIENT WITH DIETARY REGIMEN: ICD-10-CM

## 2020-01-11 DIAGNOSIS — R41.82 ALTERED MENTAL STATUS, UNSPECIFIED ALTERED MENTAL STATUS TYPE: ICD-10-CM

## 2020-01-11 DIAGNOSIS — N20.0 KIDNEY STONE ON LEFT SIDE: ICD-10-CM

## 2020-01-11 LAB
ALBUMIN SERPL-MCNC: 3.8 G/DL (ref 3.5–5.2)
ALBUMIN SERPL-MCNC: 4.1 G/DL (ref 3.5–5.2)
ALBUMIN/GLOB SERPL: 1.7 G/DL
ALBUMIN/GLOB SERPL: 1.8 G/DL
ALP SERPL-CCNC: 53 U/L (ref 39–117)
ALP SERPL-CCNC: 69 U/L (ref 39–117)
ALT SERPL W P-5'-P-CCNC: 8 U/L (ref 1–41)
ALT SERPL W P-5'-P-CCNC: 8 U/L (ref 1–41)
ANION GAP SERPL CALCULATED.3IONS-SCNC: 14.7 MMOL/L (ref 5–15)
ANION GAP SERPL CALCULATED.3IONS-SCNC: 23.4 MMOL/L (ref 5–15)
AST SERPL-CCNC: 11 U/L (ref 1–40)
AST SERPL-CCNC: 19 U/L (ref 1–40)
BACTERIA UR QL AUTO: ABNORMAL /HPF
BASOPHILS # BLD AUTO: 0.03 10*3/MM3 (ref 0–0.2)
BASOPHILS NFR BLD AUTO: 0.3 % (ref 0–1.5)
BILIRUB SERPL-MCNC: 0.6 MG/DL (ref 0.2–1.2)
BILIRUB SERPL-MCNC: 0.6 MG/DL (ref 0.2–1.2)
BILIRUB UR QL STRIP: NEGATIVE
BUN BLD-MCNC: 22 MG/DL (ref 8–23)
BUN BLD-MCNC: 23 MG/DL (ref 8–23)
BUN/CREAT SERPL: 14 (ref 7–25)
BUN/CREAT SERPL: 15 (ref 7–25)
BURR CELLS BLD QL SMEAR: ABNORMAL
CALCIUM SPEC-SCNC: 9.4 MG/DL (ref 8.6–10.5)
CALCIUM SPEC-SCNC: 9.4 MG/DL (ref 8.6–10.5)
CHLORIDE SERPL-SCNC: 95 MMOL/L (ref 98–107)
CHLORIDE SERPL-SCNC: 98 MMOL/L (ref 98–107)
CK SERPL-CCNC: 167 U/L (ref 20–200)
CLARITY UR: ABNORMAL
CO2 SERPL-SCNC: 16.6 MMOL/L (ref 22–29)
CO2 SERPL-SCNC: 25.3 MMOL/L (ref 22–29)
COLOR UR: ABNORMAL
CREAT BLD-MCNC: 1.53 MG/DL (ref 0.76–1.27)
CREAT BLD-MCNC: 1.57 MG/DL (ref 0.76–1.27)
D-LACTATE SERPL-SCNC: 3.9 MMOL/L (ref 0.5–2)
D-LACTATE SERPL-SCNC: 7.2 MMOL/L (ref 0.5–2)
DEPRECATED RDW RBC AUTO: 43.1 FL (ref 37–54)
DEPRECATED RDW RBC AUTO: 46.3 FL (ref 37–54)
EOSINOPHIL # BLD AUTO: 0 10*3/MM3 (ref 0–0.4)
EOSINOPHIL NFR BLD AUTO: 0 % (ref 0.3–6.2)
ERYTHROCYTE [DISTWIDTH] IN BLOOD BY AUTOMATED COUNT: 12.8 % (ref 12.3–15.4)
ERYTHROCYTE [DISTWIDTH] IN BLOOD BY AUTOMATED COUNT: 13.3 % (ref 12.3–15.4)
GFR SERPL CREATININE-BSD FRML MDRD: 43 ML/MIN/1.73
GFR SERPL CREATININE-BSD FRML MDRD: 44 ML/MIN/1.73
GLOBULIN UR ELPH-MCNC: 2.1 GM/DL
GLOBULIN UR ELPH-MCNC: 2.4 GM/DL
GLUCOSE BLD-MCNC: 293 MG/DL (ref 65–99)
GLUCOSE BLD-MCNC: 310 MG/DL (ref 65–99)
GLUCOSE BLDC GLUCOMTR-MCNC: 210 MG/DL (ref 70–130)
GLUCOSE BLDC GLUCOMTR-MCNC: 283 MG/DL (ref 70–130)
GLUCOSE BLDC GLUCOMTR-MCNC: 314 MG/DL (ref 70–130)
GLUCOSE UR STRIP-MCNC: ABNORMAL MG/DL
HCT VFR BLD AUTO: 32.6 % (ref 37.5–51)
HCT VFR BLD AUTO: 35.4 % (ref 37.5–51)
HGB BLD-MCNC: 11 G/DL (ref 13–17.7)
HGB BLD-MCNC: 11.7 G/DL (ref 13–17.7)
HGB UR QL STRIP.AUTO: ABNORMAL
HOLD SPECIMEN: NORMAL
HYALINE CASTS UR QL AUTO: ABNORMAL /LPF
IMM GRANULOCYTES # BLD AUTO: 0.05 10*3/MM3 (ref 0–0.05)
IMM GRANULOCYTES NFR BLD AUTO: 0.4 % (ref 0–0.5)
KETONES UR QL STRIP: ABNORMAL
LEUKOCYTE ESTERASE UR QL STRIP.AUTO: ABNORMAL
LYMPHOCYTES # BLD AUTO: 0.27 10*3/MM3 (ref 0.7–3.1)
LYMPHOCYTES # BLD MANUAL: 0.17 10*3/MM3 (ref 0.7–3.1)
LYMPHOCYTES NFR BLD AUTO: 2.4 % (ref 19.6–45.3)
LYMPHOCYTES NFR BLD MANUAL: 3 % (ref 19.6–45.3)
MCH RBC QN AUTO: 31 PG (ref 26.6–33)
MCH RBC QN AUTO: 31.1 PG (ref 26.6–33)
MCHC RBC AUTO-ENTMCNC: 33.1 G/DL (ref 31.5–35.7)
MCHC RBC AUTO-ENTMCNC: 33.7 G/DL (ref 31.5–35.7)
MCV RBC AUTO: 91.8 FL (ref 79–97)
MCV RBC AUTO: 94.1 FL (ref 79–97)
MONOCYTES # BLD AUTO: 0.33 10*3/MM3 (ref 0.1–0.9)
MONOCYTES NFR BLD AUTO: 2.9 % (ref 5–12)
MYELOCYTES NFR BLD MANUAL: 1 % (ref 0–0)
NEUTROPHILS # BLD AUTO: 10.55 10*3/MM3 (ref 1.7–7)
NEUTROPHILS # BLD AUTO: 5.44 10*3/MM3 (ref 1.7–7)
NEUTROPHILS NFR BLD AUTO: 94 % (ref 42.7–76)
NEUTROPHILS NFR BLD MANUAL: 96 % (ref 42.7–76)
NITRITE UR QL STRIP: POSITIVE
NRBC BLD AUTO-RTO: 0 /100 WBC (ref 0–0.2)
PH UR STRIP.AUTO: <=5 [PH] (ref 5–8)
PLAT MORPH BLD: NORMAL
PLATELET # BLD AUTO: 150 10*3/MM3 (ref 140–450)
PLATELET # BLD AUTO: 161 10*3/MM3 (ref 140–450)
PMV BLD AUTO: 10.6 FL (ref 6–12)
PMV BLD AUTO: 10.8 FL (ref 6–12)
POIKILOCYTOSIS BLD QL SMEAR: ABNORMAL
POTASSIUM BLD-SCNC: 3.6 MMOL/L (ref 3.5–5.2)
POTASSIUM BLD-SCNC: 4.1 MMOL/L (ref 3.5–5.2)
PROT SERPL-MCNC: 5.9 G/DL (ref 6–8.5)
PROT SERPL-MCNC: 6.5 G/DL (ref 6–8.5)
PROT UR QL STRIP: ABNORMAL
RBC # BLD AUTO: 3.55 10*6/MM3 (ref 4.14–5.8)
RBC # BLD AUTO: 3.76 10*6/MM3 (ref 4.14–5.8)
RBC # UR: ABNORMAL /HPF
REF LAB TEST METHOD: ABNORMAL
SCHISTOCYTES BLD QL SMEAR: ABNORMAL
SODIUM BLD-SCNC: 135 MMOL/L (ref 136–145)
SODIUM BLD-SCNC: 138 MMOL/L (ref 136–145)
SP GR UR STRIP: 1.02 (ref 1–1.03)
SQUAMOUS #/AREA URNS HPF: ABNORMAL /HPF
UROBILINOGEN UR QL STRIP: ABNORMAL
WBC MORPH BLD: NORMAL
WBC NRBC COR # BLD: 11.23 10*3/MM3 (ref 3.4–10.8)
WBC NRBC COR # BLD: 5.67 10*3/MM3 (ref 3.4–10.8)
WBC UR QL AUTO: ABNORMAL /HPF
WHOLE BLOOD HOLD SPECIMEN: NORMAL
WHOLE BLOOD HOLD SPECIMEN: NORMAL

## 2020-01-11 PROCEDURE — C2617 STENT, NON-COR, TEM W/O DEL: HCPCS | Performed by: UROLOGY

## 2020-01-11 PROCEDURE — 25010000002 ONDANSETRON PER 1 MG: Performed by: EMERGENCY MEDICINE

## 2020-01-11 PROCEDURE — 85025 COMPLETE CBC W/AUTO DIFF WBC: CPT | Performed by: NURSE PRACTITIONER

## 2020-01-11 PROCEDURE — 82550 ASSAY OF CK (CPK): CPT | Performed by: NURSE PRACTITIONER

## 2020-01-11 PROCEDURE — 0T778DZ DILATION OF LEFT URETER WITH INTRALUMINAL DEVICE, VIA NATURAL OR ARTIFICIAL OPENING ENDOSCOPIC: ICD-10-PCS | Performed by: UROLOGY

## 2020-01-11 PROCEDURE — 82962 GLUCOSE BLOOD TEST: CPT

## 2020-01-11 PROCEDURE — P9612 CATHETERIZE FOR URINE SPEC: HCPCS

## 2020-01-11 PROCEDURE — 87040 BLOOD CULTURE FOR BACTERIA: CPT | Performed by: EMERGENCY MEDICINE

## 2020-01-11 PROCEDURE — 80053 COMPREHEN METABOLIC PANEL: CPT | Performed by: EMERGENCY MEDICINE

## 2020-01-11 PROCEDURE — 87186 SC STD MICRODIL/AGAR DIL: CPT | Performed by: NURSE PRACTITIONER

## 2020-01-11 PROCEDURE — C1769 GUIDE WIRE: HCPCS | Performed by: UROLOGY

## 2020-01-11 PROCEDURE — 85007 BL SMEAR W/DIFF WBC COUNT: CPT | Performed by: EMERGENCY MEDICINE

## 2020-01-11 PROCEDURE — 99285 EMERGENCY DEPT VISIT HI MDM: CPT

## 2020-01-11 PROCEDURE — 82360 CALCULUS ASSAY QUANT: CPT | Performed by: UROLOGY

## 2020-01-11 PROCEDURE — 25010000002 PROPOFOL 10 MG/ML EMULSION: Performed by: ANESTHESIOLOGY

## 2020-01-11 PROCEDURE — 87088 URINE BACTERIA CULTURE: CPT | Performed by: NURSE PRACTITIONER

## 2020-01-11 PROCEDURE — 25010000002 ONDANSETRON PER 1 MG: Performed by: ANESTHESIOLOGY

## 2020-01-11 PROCEDURE — 25010000002 PHENYLEPHRINE PER 1 ML: Performed by: ANESTHESIOLOGY

## 2020-01-11 PROCEDURE — 25010000002 FENTANYL CITRATE (PF) 100 MCG/2ML SOLUTION: Performed by: ANESTHESIOLOGY

## 2020-01-11 PROCEDURE — 0 IOTHALAMATE 60 % SOLUTION: Performed by: UROLOGY

## 2020-01-11 PROCEDURE — 99284 EMERGENCY DEPT VISIT MOD MDM: CPT

## 2020-01-11 PROCEDURE — 96365 THER/PROPH/DIAG IV INF INIT: CPT

## 2020-01-11 PROCEDURE — 80053 COMPREHEN METABOLIC PANEL: CPT | Performed by: NURSE PRACTITIONER

## 2020-01-11 PROCEDURE — 87086 URINE CULTURE/COLONY COUNT: CPT | Performed by: NURSE PRACTITIONER

## 2020-01-11 PROCEDURE — 25010000002 LEVOFLOXACIN PER 250 MG: Performed by: EMERGENCY MEDICINE

## 2020-01-11 PROCEDURE — 83605 ASSAY OF LACTIC ACID: CPT | Performed by: EMERGENCY MEDICINE

## 2020-01-11 PROCEDURE — 93005 ELECTROCARDIOGRAM TRACING: CPT | Performed by: EMERGENCY MEDICINE

## 2020-01-11 PROCEDURE — 74420 UROGRAPHY RTRGR +-KUB: CPT

## 2020-01-11 PROCEDURE — 71046 X-RAY EXAM CHEST 2 VIEWS: CPT

## 2020-01-11 PROCEDURE — 25010000002 CEFTRIAXONE PER 250 MG: Performed by: NURSE PRACTITIONER

## 2020-01-11 PROCEDURE — 74176 CT ABD & PELVIS W/O CONTRAST: CPT

## 2020-01-11 PROCEDURE — 63710000001 INSULIN REGULAR HUMAN PER 5 UNITS: Performed by: INTERNAL MEDICINE

## 2020-01-11 PROCEDURE — 25010000002 SUCCINYLCHOLINE PER 20 MG: Performed by: ANESTHESIOLOGY

## 2020-01-11 PROCEDURE — 81001 URINALYSIS AUTO W/SCOPE: CPT | Performed by: NURSE PRACTITIONER

## 2020-01-11 PROCEDURE — 93010 ELECTROCARDIOGRAM REPORT: CPT | Performed by: INTERNAL MEDICINE

## 2020-01-11 PROCEDURE — 93005 ELECTROCARDIOGRAM TRACING: CPT

## 2020-01-11 PROCEDURE — 85025 COMPLETE CBC W/AUTO DIFF WBC: CPT | Performed by: EMERGENCY MEDICINE

## 2020-01-11 DEVICE — URETERAL STENT
Type: IMPLANTABLE DEVICE | Site: URETER | Status: FUNCTIONAL
Brand: CONTOUR™

## 2020-01-11 RX ORDER — ACETAMINOPHEN 325 MG/1
650 TABLET ORAL EVERY 4 HOURS PRN
Status: DISCONTINUED | OUTPATIENT
Start: 2020-01-11 | End: 2020-01-19 | Stop reason: HOSPADM

## 2020-01-11 RX ORDER — SODIUM CHLORIDE, SODIUM LACTATE, POTASSIUM CHLORIDE, CALCIUM CHLORIDE 600; 310; 30; 20 MG/100ML; MG/100ML; MG/100ML; MG/100ML
INJECTION, SOLUTION INTRAVENOUS CONTINUOUS PRN
Status: DISCONTINUED | OUTPATIENT
Start: 2020-01-11 | End: 2020-01-11 | Stop reason: SURG

## 2020-01-11 RX ORDER — FLUMAZENIL 0.1 MG/ML
0.2 INJECTION INTRAVENOUS AS NEEDED
Status: DISCONTINUED | OUTPATIENT
Start: 2020-01-11 | End: 2020-01-11 | Stop reason: HOSPADM

## 2020-01-11 RX ORDER — ACETAMINOPHEN 500 MG
1000 TABLET ORAL ONCE
Status: COMPLETED | OUTPATIENT
Start: 2020-01-11 | End: 2020-01-11

## 2020-01-11 RX ORDER — CEFTRIAXONE SODIUM 1 G/50ML
1 INJECTION, SOLUTION INTRAVENOUS ONCE
Status: COMPLETED | OUTPATIENT
Start: 2020-01-11 | End: 2020-01-11

## 2020-01-11 RX ORDER — CEPHALEXIN 500 MG/1
500 CAPSULE ORAL 4 TIMES DAILY
Qty: 20 CAPSULE | Refills: 0 | Status: SHIPPED | OUTPATIENT
Start: 2020-01-11 | End: 2020-01-18 | Stop reason: HOSPADM

## 2020-01-11 RX ORDER — NICOTINE POLACRILEX 4 MG
15 LOZENGE BUCCAL
Status: DISCONTINUED | OUTPATIENT
Start: 2020-01-11 | End: 2020-01-19 | Stop reason: HOSPADM

## 2020-01-11 RX ORDER — SODIUM CHLORIDE 0.9 % (FLUSH) 0.9 %
10 SYRINGE (ML) INJECTION AS NEEDED
Status: DISCONTINUED | OUTPATIENT
Start: 2020-01-11 | End: 2020-01-11 | Stop reason: HOSPADM

## 2020-01-11 RX ORDER — ONDANSETRON 2 MG/ML
4 INJECTION INTRAMUSCULAR; INTRAVENOUS ONCE AS NEEDED
Status: DISCONTINUED | OUTPATIENT
Start: 2020-01-11 | End: 2020-01-11 | Stop reason: HOSPADM

## 2020-01-11 RX ORDER — LEVOFLOXACIN 5 MG/ML
750 INJECTION, SOLUTION INTRAVENOUS ONCE
Status: COMPLETED | OUTPATIENT
Start: 2020-01-11 | End: 2020-01-11

## 2020-01-11 RX ORDER — ONDANSETRON 4 MG/1
4 TABLET, FILM COATED ORAL EVERY 6 HOURS PRN
Status: DISCONTINUED | OUTPATIENT
Start: 2020-01-11 | End: 2020-01-19 | Stop reason: HOSPADM

## 2020-01-11 RX ORDER — FENTANYL CITRATE 50 UG/ML
INJECTION, SOLUTION INTRAMUSCULAR; INTRAVENOUS AS NEEDED
Status: DISCONTINUED | OUTPATIENT
Start: 2020-01-11 | End: 2020-01-11 | Stop reason: SURG

## 2020-01-11 RX ORDER — DIPHENHYDRAMINE HCL 25 MG
25 CAPSULE ORAL
Status: DISCONTINUED | OUTPATIENT
Start: 2020-01-11 | End: 2020-01-11 | Stop reason: HOSPADM

## 2020-01-11 RX ORDER — LIDOCAINE HYDROCHLORIDE 20 MG/ML
INJECTION, SOLUTION INFILTRATION; PERINEURAL AS NEEDED
Status: DISCONTINUED | OUTPATIENT
Start: 2020-01-11 | End: 2020-01-11 | Stop reason: SURG

## 2020-01-11 RX ORDER — EPHEDRINE SULFATE 50 MG/ML
5 INJECTION, SOLUTION INTRAVENOUS ONCE AS NEEDED
Status: DISCONTINUED | OUTPATIENT
Start: 2020-01-11 | End: 2020-01-11 | Stop reason: HOSPADM

## 2020-01-11 RX ORDER — ACETAMINOPHEN 325 MG/1
650 TABLET ORAL ONCE AS NEEDED
Status: DISCONTINUED | OUTPATIENT
Start: 2020-01-11 | End: 2020-01-11 | Stop reason: HOSPADM

## 2020-01-11 RX ORDER — ONDANSETRON 2 MG/ML
4 INJECTION INTRAMUSCULAR; INTRAVENOUS EVERY 6 HOURS PRN
Status: DISCONTINUED | OUTPATIENT
Start: 2020-01-11 | End: 2020-01-19 | Stop reason: HOSPADM

## 2020-01-11 RX ORDER — ONDANSETRON 2 MG/ML
INJECTION INTRAMUSCULAR; INTRAVENOUS AS NEEDED
Status: DISCONTINUED | OUTPATIENT
Start: 2020-01-11 | End: 2020-01-11 | Stop reason: SURG

## 2020-01-11 RX ORDER — PROPOFOL 10 MG/ML
VIAL (ML) INTRAVENOUS AS NEEDED
Status: DISCONTINUED | OUTPATIENT
Start: 2020-01-11 | End: 2020-01-11 | Stop reason: SURG

## 2020-01-11 RX ORDER — EPHEDRINE SULFATE 50 MG/ML
INJECTION, SOLUTION INTRAVENOUS AS NEEDED
Status: DISCONTINUED | OUTPATIENT
Start: 2020-01-11 | End: 2020-01-11 | Stop reason: SURG

## 2020-01-11 RX ORDER — LABETALOL HYDROCHLORIDE 5 MG/ML
5 INJECTION, SOLUTION INTRAVENOUS
Status: DISCONTINUED | OUTPATIENT
Start: 2020-01-11 | End: 2020-01-11 | Stop reason: HOSPADM

## 2020-01-11 RX ORDER — FENTANYL CITRATE 50 UG/ML
INJECTION, SOLUTION INTRAMUSCULAR; INTRAVENOUS
Status: COMPLETED
Start: 2020-01-11 | End: 2020-01-11

## 2020-01-11 RX ORDER — HYDRALAZINE HYDROCHLORIDE 20 MG/ML
5 INJECTION INTRAMUSCULAR; INTRAVENOUS
Status: DISCONTINUED | OUTPATIENT
Start: 2020-01-11 | End: 2020-01-11 | Stop reason: HOSPADM

## 2020-01-11 RX ORDER — PROMETHAZINE HYDROCHLORIDE 25 MG/ML
6.25 INJECTION, SOLUTION INTRAMUSCULAR; INTRAVENOUS
Status: DISCONTINUED | OUTPATIENT
Start: 2020-01-11 | End: 2020-01-11 | Stop reason: HOSPADM

## 2020-01-11 RX ORDER — OXYCODONE AND ACETAMINOPHEN 7.5; 325 MG/1; MG/1
1 TABLET ORAL ONCE AS NEEDED
Status: DISCONTINUED | OUTPATIENT
Start: 2020-01-11 | End: 2020-01-11 | Stop reason: HOSPADM

## 2020-01-11 RX ORDER — HYDROCODONE BITARTRATE AND ACETAMINOPHEN 7.5; 325 MG/1; MG/1
1 TABLET ORAL ONCE AS NEEDED
Status: DISCONTINUED | OUTPATIENT
Start: 2020-01-11 | End: 2020-01-11 | Stop reason: HOSPADM

## 2020-01-11 RX ORDER — MAGNESIUM HYDROXIDE 1200 MG/15ML
LIQUID ORAL AS NEEDED
Status: DISCONTINUED | OUTPATIENT
Start: 2020-01-11 | End: 2020-01-19 | Stop reason: HOSPADM

## 2020-01-11 RX ORDER — PROMETHAZINE HYDROCHLORIDE 25 MG/1
25 TABLET ORAL ONCE AS NEEDED
Status: DISCONTINUED | OUTPATIENT
Start: 2020-01-11 | End: 2020-01-11 | Stop reason: HOSPADM

## 2020-01-11 RX ORDER — SODIUM CHLORIDE 0.9 % (FLUSH) 0.9 %
10 SYRINGE (ML) INJECTION EVERY 12 HOURS SCHEDULED
Status: DISCONTINUED | OUTPATIENT
Start: 2020-01-11 | End: 2020-01-19 | Stop reason: HOSPADM

## 2020-01-11 RX ORDER — HYDROMORPHONE HYDROCHLORIDE 1 MG/ML
0.5 INJECTION, SOLUTION INTRAMUSCULAR; INTRAVENOUS; SUBCUTANEOUS
Status: DISCONTINUED | OUTPATIENT
Start: 2020-01-11 | End: 2020-01-11 | Stop reason: HOSPADM

## 2020-01-11 RX ORDER — NALOXONE HCL 0.4 MG/ML
0.2 VIAL (ML) INJECTION AS NEEDED
Status: DISCONTINUED | OUTPATIENT
Start: 2020-01-11 | End: 2020-01-11 | Stop reason: HOSPADM

## 2020-01-11 RX ORDER — SODIUM CHLORIDE 0.9 % (FLUSH) 0.9 %
10 SYRINGE (ML) INJECTION AS NEEDED
Status: DISCONTINUED | OUTPATIENT
Start: 2020-01-11 | End: 2020-01-19 | Stop reason: HOSPADM

## 2020-01-11 RX ORDER — FENTANYL CITRATE 50 UG/ML
50 INJECTION, SOLUTION INTRAMUSCULAR; INTRAVENOUS
Status: DISCONTINUED | OUTPATIENT
Start: 2020-01-11 | End: 2020-01-11 | Stop reason: HOSPADM

## 2020-01-11 RX ORDER — PROMETHAZINE HYDROCHLORIDE 25 MG/ML
12.5 INJECTION, SOLUTION INTRAMUSCULAR; INTRAVENOUS ONCE AS NEEDED
Status: DISCONTINUED | OUTPATIENT
Start: 2020-01-11 | End: 2020-01-11 | Stop reason: HOSPADM

## 2020-01-11 RX ORDER — DIPHENHYDRAMINE HYDROCHLORIDE 50 MG/ML
12.5 INJECTION INTRAMUSCULAR; INTRAVENOUS
Status: DISCONTINUED | OUTPATIENT
Start: 2020-01-11 | End: 2020-01-11 | Stop reason: HOSPADM

## 2020-01-11 RX ORDER — DEXTROSE MONOHYDRATE 25 G/50ML
25 INJECTION, SOLUTION INTRAVENOUS
Status: DISCONTINUED | OUTPATIENT
Start: 2020-01-11 | End: 2020-01-19 | Stop reason: HOSPADM

## 2020-01-11 RX ORDER — ONDANSETRON 2 MG/ML
4 INJECTION INTRAMUSCULAR; INTRAVENOUS ONCE
Status: COMPLETED | OUTPATIENT
Start: 2020-01-11 | End: 2020-01-11

## 2020-01-11 RX ORDER — SUCCINYLCHOLINE CHLORIDE 20 MG/ML
INJECTION INTRAMUSCULAR; INTRAVENOUS AS NEEDED
Status: DISCONTINUED | OUTPATIENT
Start: 2020-01-11 | End: 2020-01-11 | Stop reason: SURG

## 2020-01-11 RX ORDER — PROMETHAZINE HYDROCHLORIDE 25 MG/1
25 SUPPOSITORY RECTAL ONCE AS NEEDED
Status: DISCONTINUED | OUTPATIENT
Start: 2020-01-11 | End: 2020-01-11 | Stop reason: HOSPADM

## 2020-01-11 RX ADMIN — SODIUM CHLORIDE, POTASSIUM CHLORIDE, SODIUM LACTATE AND CALCIUM CHLORIDE 1755 ML: 600; 310; 30; 20 INJECTION, SOLUTION INTRAVENOUS at 18:26

## 2020-01-11 RX ADMIN — PHENYLEPHRINE HYDROCHLORIDE 100 MCG: 10 INJECTION INTRAVENOUS at 22:03

## 2020-01-11 RX ADMIN — FENTANYL CITRATE 50 MCG: 50 INJECTION INTRAMUSCULAR; INTRAVENOUS at 21:48

## 2020-01-11 RX ADMIN — CEFTRIAXONE SODIUM 1 G: 1 INJECTION, SOLUTION INTRAVENOUS at 13:07

## 2020-01-11 RX ADMIN — SODIUM CHLORIDE 1000 ML: 9 INJECTION, SOLUTION INTRAVENOUS at 11:28

## 2020-01-11 RX ADMIN — LIDOCAINE HYDROCHLORIDE 50 MG: 20 INJECTION, SOLUTION INFILTRATION; PERINEURAL at 21:50

## 2020-01-11 RX ADMIN — ONDANSETRON HYDROCHLORIDE 4 MG: 2 SOLUTION INTRAMUSCULAR; INTRAVENOUS at 22:06

## 2020-01-11 RX ADMIN — EPHEDRINE SULFATE 10 MG: 50 INJECTION INTRAVENOUS at 22:06

## 2020-01-11 RX ADMIN — PROPOFOL 100 MG: 10 INJECTION, EMULSION INTRAVENOUS at 21:50

## 2020-01-11 RX ADMIN — SODIUM CHLORIDE, PRESERVATIVE FREE 10 ML: 5 INJECTION INTRAVENOUS at 20:05

## 2020-01-11 RX ADMIN — SUCCINYLCHOLINE CHLORIDE 100 MG: 20 INJECTION, SOLUTION INTRAMUSCULAR; INTRAVENOUS; PARENTERAL at 21:50

## 2020-01-11 RX ADMIN — ACETAMINOPHEN 1000 MG: 500 TABLET, FILM COATED ORAL at 17:15

## 2020-01-11 RX ADMIN — LEVOFLOXACIN 750 MG: 5 INJECTION, SOLUTION INTRAVENOUS at 18:51

## 2020-01-11 RX ADMIN — SODIUM CHLORIDE, POTASSIUM CHLORIDE, SODIUM LACTATE AND CALCIUM CHLORIDE: 600; 310; 30; 20 INJECTION, SOLUTION INTRAVENOUS at 21:45

## 2020-01-11 RX ADMIN — ONDANSETRON 4 MG: 2 INJECTION INTRAMUSCULAR; INTRAVENOUS at 17:15

## 2020-01-11 RX ADMIN — INSULIN HUMAN 7 UNITS: 100 INJECTION, SOLUTION PARENTERAL at 20:04

## 2020-01-11 RX ADMIN — PHENYLEPHRINE HYDROCHLORIDE 100 MCG: 10 INJECTION INTRAVENOUS at 21:59

## 2020-01-12 LAB
GLUCOSE BLDC GLUCOMTR-MCNC: 145 MG/DL (ref 70–130)
GLUCOSE BLDC GLUCOMTR-MCNC: 215 MG/DL (ref 70–130)
GLUCOSE BLDC GLUCOMTR-MCNC: 270 MG/DL (ref 70–130)
GLUCOSE BLDC GLUCOMTR-MCNC: 284 MG/DL (ref 70–130)
GLUCOSE BLDC GLUCOMTR-MCNC: 318 MG/DL (ref 70–130)
GLUCOSE BLDC GLUCOMTR-MCNC: 327 MG/DL (ref 70–130)

## 2020-01-12 PROCEDURE — 63710000001 INSULIN REGULAR HUMAN PER 5 UNITS: Performed by: INTERNAL MEDICINE

## 2020-01-12 PROCEDURE — 82962 GLUCOSE BLOOD TEST: CPT

## 2020-01-12 PROCEDURE — 25010000003 CEFTRIAXONE PER 250 MG: Performed by: INTERNAL MEDICINE

## 2020-01-12 PROCEDURE — 63710000001 INSULIN REGULAR HUMAN PER 5 UNITS: Performed by: UROLOGY

## 2020-01-12 RX ORDER — CEFTRIAXONE SODIUM 2 G/50ML
2 INJECTION, SOLUTION INTRAVENOUS EVERY 24 HOURS
Status: COMPLETED | OUTPATIENT
Start: 2020-01-12 | End: 2020-01-16

## 2020-01-12 RX ADMIN — INSULIN HUMAN 7 UNITS: 100 INJECTION, SOLUTION PARENTERAL at 06:33

## 2020-01-12 RX ADMIN — INSULIN HUMAN 6 UNITS: 100 INJECTION, SOLUTION PARENTERAL at 17:43

## 2020-01-12 RX ADMIN — SODIUM CHLORIDE, PRESERVATIVE FREE 10 ML: 5 INJECTION INTRAVENOUS at 20:14

## 2020-01-12 RX ADMIN — CEFTRIAXONE SODIUM 2 G: 2 INJECTION, SOLUTION INTRAVENOUS at 16:41

## 2020-01-12 RX ADMIN — SODIUM CHLORIDE, PRESERVATIVE FREE 10 ML: 5 INJECTION INTRAVENOUS at 09:01

## 2020-01-12 RX ADMIN — INSULIN HUMAN 7 UNITS: 100 INJECTION, SOLUTION PARENTERAL at 22:45

## 2020-01-12 NOTE — ANESTHESIA PREPROCEDURE EVALUATION
Anesthesia Evaluation     Patient summary reviewed and Nursing notes reviewed   no history of anesthetic complications:  NPO Solid Status: > 6 hours  NPO Liquid Status: > 6 hours           Airway   Mallampati: II  TM distance: >3 FB  Neck ROM: full  no difficulty expected and No difficulty expected  Dental - normal exam     Pulmonary - negative pulmonary ROS and normal exam    breath sounds clear to auscultation  (-) rhonchi, decreased breath sounds, wheezes, rales, stridor  Cardiovascular - normal exam    NYHA Classification: I  ECG reviewed  Rhythm: regular  Rate: normal    (+) hypertension, PVD, hyperlipidemia,   (-) murmur, weak pulses, friction rub, systolic click, carotid bruits, JVD, peripheral edema      Neuro/Psych  (+) numbness, psychiatric history Anxiety and Depression,     GI/Hepatic/Renal/Endo    (+)  GERD,  renal disease stones and CRI, diabetes mellitus type 2 poorly controlled,     Musculoskeletal (-) negative ROS    Abdominal  - normal exam    Abdomen: soft.   Substance History - negative use     OB/GYN negative ob/gyn ROS         Other - negative ROS                       Anesthesia Plan    ASA 3 - emergent     general     intravenous induction     Anesthetic plan, all risks, benefits, and alternatives have been provided, discussed and informed consent has been obtained with: patient.

## 2020-01-12 NOTE — ANESTHESIA POSTPROCEDURE EVALUATION
Patient: Courtney Medrano    Procedure Summary     Date:  01/11/20 Room / Location:  [facility] OR 01 / [facility] MAIN OR    Anesthesia Start:  2145 Anesthesia Stop:  2237    Procedure:  CYSTOSCOPY URETERAL CATHETER/STENT INSERTION (Left ) Diagnosis:       Pyelonephritis, acute      Ureteral calculus, left      (Obstructing Pyelonephritis)    Surgeon:  Clark Ramirez MD Provider:  Balaji Moy MD    Anesthesia Type:  general ASA Status:  3 - Emergent          Anesthesia Type: general    Vitals  Vitals Value Taken Time   /71 1/11/2020 11:10 PM   Temp 36.9 °C (98.4 °F) 1/11/2020 10:34 PM   Pulse 70 1/11/2020 11:21 PM   Resp 20 1/11/2020 10:34 PM   SpO2 100 % 1/11/2020 11:21 PM   Vitals shown include unvalidated device data.        Post Anesthesia Care and Evaluation    No anesthesia care post op

## 2020-01-13 LAB
BACTERIA SPEC AEROBE CULT: ABNORMAL
GLUCOSE BLDC GLUCOMTR-MCNC: 136 MG/DL (ref 70–130)
GLUCOSE BLDC GLUCOMTR-MCNC: 253 MG/DL (ref 70–130)
GLUCOSE BLDC GLUCOMTR-MCNC: 280 MG/DL (ref 70–130)
GLUCOSE BLDC GLUCOMTR-MCNC: 317 MG/DL (ref 70–130)
GLUCOSE BLDC GLUCOMTR-MCNC: 343 MG/DL (ref 70–130)
GLUCOSE BLDC GLUCOMTR-MCNC: 91 MG/DL (ref 70–130)
GLUCOSE BLDC GLUCOMTR-MCNC: 99 MG/DL (ref 70–130)

## 2020-01-13 PROCEDURE — 82962 GLUCOSE BLOOD TEST: CPT

## 2020-01-13 PROCEDURE — 97162 PT EVAL MOD COMPLEX 30 MIN: CPT

## 2020-01-13 PROCEDURE — 97110 THERAPEUTIC EXERCISES: CPT

## 2020-01-13 PROCEDURE — 25010000003 CEFTRIAXONE PER 250 MG: Performed by: INTERNAL MEDICINE

## 2020-01-13 PROCEDURE — 63710000001 INSULIN REGULAR HUMAN PER 5 UNITS: Performed by: INTERNAL MEDICINE

## 2020-01-13 RX ADMIN — INSULIN HUMAN 6 UNITS: 100 INJECTION, SOLUTION PARENTERAL at 12:02

## 2020-01-13 RX ADMIN — SODIUM CHLORIDE, PRESERVATIVE FREE 10 ML: 5 INJECTION INTRAVENOUS at 08:57

## 2020-01-13 RX ADMIN — SODIUM CHLORIDE, PRESERVATIVE FREE 10 ML: 5 INJECTION INTRAVENOUS at 20:13

## 2020-01-13 RX ADMIN — INSULIN HUMAN 6 UNITS: 100 INJECTION, SOLUTION PARENTERAL at 17:14

## 2020-01-13 RX ADMIN — CEFTRIAXONE SODIUM 2 G: 2 INJECTION, SOLUTION INTRAVENOUS at 17:14

## 2020-01-13 RX ADMIN — INSULIN HUMAN 7 UNITS: 100 INJECTION, SOLUTION PARENTERAL at 21:21

## 2020-01-14 LAB
ANION GAP SERPL CALCULATED.3IONS-SCNC: 12.5 MMOL/L (ref 5–15)
BASOPHILS # BLD AUTO: 0.03 10*3/MM3 (ref 0–0.2)
BASOPHILS NFR BLD AUTO: 0.6 % (ref 0–1.5)
BUN BLD-MCNC: 20 MG/DL (ref 8–23)
BUN/CREAT SERPL: 16.9 (ref 7–25)
CALCIUM SPEC-SCNC: 9.4 MG/DL (ref 8.6–10.5)
CHLORIDE SERPL-SCNC: 100 MMOL/L (ref 98–107)
CO2 SERPL-SCNC: 25.5 MMOL/L (ref 22–29)
CREAT BLD-MCNC: 1.18 MG/DL (ref 0.76–1.27)
DEPRECATED RDW RBC AUTO: 44.9 FL (ref 37–54)
EOSINOPHIL # BLD AUTO: 0.05 10*3/MM3 (ref 0–0.4)
EOSINOPHIL NFR BLD AUTO: 0.9 % (ref 0.3–6.2)
ERYTHROCYTE [DISTWIDTH] IN BLOOD BY AUTOMATED COUNT: 13.2 % (ref 12.3–15.4)
GFR SERPL CREATININE-BSD FRML MDRD: 60 ML/MIN/1.73
GLUCOSE BLD-MCNC: 264 MG/DL (ref 65–99)
GLUCOSE BLDC GLUCOMTR-MCNC: 169 MG/DL (ref 70–130)
GLUCOSE BLDC GLUCOMTR-MCNC: 286 MG/DL (ref 70–130)
GLUCOSE BLDC GLUCOMTR-MCNC: 328 MG/DL (ref 70–130)
GLUCOSE BLDC GLUCOMTR-MCNC: 356 MG/DL (ref 70–130)
GLUCOSE BLDC GLUCOMTR-MCNC: 383 MG/DL (ref 70–130)
HCT VFR BLD AUTO: 31.6 % (ref 37.5–51)
HGB BLD-MCNC: 10.5 G/DL (ref 13–17.7)
IMM GRANULOCYTES # BLD AUTO: 0.03 10*3/MM3 (ref 0–0.05)
IMM GRANULOCYTES NFR BLD AUTO: 0.6 % (ref 0–0.5)
LYMPHOCYTES # BLD AUTO: 1.11 10*3/MM3 (ref 0.7–3.1)
LYMPHOCYTES NFR BLD AUTO: 20.7 % (ref 19.6–45.3)
MCH RBC QN AUTO: 30.9 PG (ref 26.6–33)
MCHC RBC AUTO-ENTMCNC: 33.2 G/DL (ref 31.5–35.7)
MCV RBC AUTO: 92.9 FL (ref 79–97)
MONOCYTES # BLD AUTO: 0.36 10*3/MM3 (ref 0.1–0.9)
MONOCYTES NFR BLD AUTO: 6.7 % (ref 5–12)
NEUTROPHILS # BLD AUTO: 3.77 10*3/MM3 (ref 1.7–7)
NEUTROPHILS NFR BLD AUTO: 70.5 % (ref 42.7–76)
NRBC BLD AUTO-RTO: 0 /100 WBC (ref 0–0.2)
PLATELET # BLD AUTO: 135 10*3/MM3 (ref 140–450)
PMV BLD AUTO: 11.1 FL (ref 6–12)
POTASSIUM BLD-SCNC: 3.9 MMOL/L (ref 3.5–5.2)
RBC # BLD AUTO: 3.4 10*6/MM3 (ref 4.14–5.8)
SODIUM BLD-SCNC: 138 MMOL/L (ref 136–145)
WBC NRBC COR # BLD: 5.35 10*3/MM3 (ref 3.4–10.8)

## 2020-01-14 PROCEDURE — 25010000003 CEFTRIAXONE PER 250 MG: Performed by: INTERNAL MEDICINE

## 2020-01-14 PROCEDURE — 97110 THERAPEUTIC EXERCISES: CPT

## 2020-01-14 PROCEDURE — 82962 GLUCOSE BLOOD TEST: CPT

## 2020-01-14 PROCEDURE — 80048 BASIC METABOLIC PNL TOTAL CA: CPT | Performed by: INTERNAL MEDICINE

## 2020-01-14 PROCEDURE — 97116 GAIT TRAINING THERAPY: CPT

## 2020-01-14 PROCEDURE — 63710000001 INSULIN REGULAR HUMAN PER 5 UNITS: Performed by: INTERNAL MEDICINE

## 2020-01-14 PROCEDURE — 85025 COMPLETE CBC W/AUTO DIFF WBC: CPT | Performed by: INTERNAL MEDICINE

## 2020-01-14 RX ORDER — LISINOPRIL 10 MG/1
10 TABLET ORAL DAILY
Status: DISCONTINUED | OUTPATIENT
Start: 2020-01-14 | End: 2020-01-17

## 2020-01-14 RX ADMIN — INSULIN HUMAN 2 UNITS: 100 INJECTION, SOLUTION PARENTERAL at 08:19

## 2020-01-14 RX ADMIN — INSULIN HUMAN 6 UNITS: 100 INJECTION, SOLUTION PARENTERAL at 11:35

## 2020-01-14 RX ADMIN — ACETAMINOPHEN 650 MG: 325 TABLET, FILM COATED ORAL at 21:48

## 2020-01-14 RX ADMIN — ACETAMINOPHEN 650 MG: 325 TABLET, FILM COATED ORAL at 03:55

## 2020-01-14 RX ADMIN — LISINOPRIL 10 MG: 10 TABLET ORAL at 14:50

## 2020-01-14 RX ADMIN — SODIUM CHLORIDE, PRESERVATIVE FREE 10 ML: 5 INJECTION INTRAVENOUS at 21:45

## 2020-01-14 RX ADMIN — ACETAMINOPHEN 650 MG: 325 TABLET, FILM COATED ORAL at 08:19

## 2020-01-14 RX ADMIN — CEFTRIAXONE SODIUM 2 G: 2 INJECTION, SOLUTION INTRAVENOUS at 15:03

## 2020-01-14 RX ADMIN — SODIUM CHLORIDE, PRESERVATIVE FREE 10 ML: 5 INJECTION INTRAVENOUS at 08:19

## 2020-01-14 RX ADMIN — INSULIN HUMAN 7 UNITS: 100 INJECTION, SOLUTION PARENTERAL at 21:30

## 2020-01-14 RX ADMIN — INSULIN HUMAN 8 UNITS: 100 INJECTION, SOLUTION PARENTERAL at 16:53

## 2020-01-15 LAB
GLUCOSE BLDC GLUCOMTR-MCNC: 143 MG/DL (ref 70–130)
GLUCOSE BLDC GLUCOMTR-MCNC: 158 MG/DL (ref 70–130)
GLUCOSE BLDC GLUCOMTR-MCNC: 324 MG/DL (ref 70–130)
GLUCOSE BLDC GLUCOMTR-MCNC: 339 MG/DL (ref 70–130)
GLUCOSE BLDC GLUCOMTR-MCNC: 363 MG/DL (ref 70–130)

## 2020-01-15 PROCEDURE — 97110 THERAPEUTIC EXERCISES: CPT

## 2020-01-15 PROCEDURE — 97116 GAIT TRAINING THERAPY: CPT

## 2020-01-15 PROCEDURE — 25010000003 CEFTRIAXONE PER 250 MG: Performed by: INTERNAL MEDICINE

## 2020-01-15 PROCEDURE — 63710000001 INSULIN REGULAR HUMAN PER 5 UNITS: Performed by: INTERNAL MEDICINE

## 2020-01-15 PROCEDURE — 82962 GLUCOSE BLOOD TEST: CPT

## 2020-01-15 RX ORDER — CHOLECALCIFEROL (VITAMIN D3) 125 MCG
5 CAPSULE ORAL NIGHTLY PRN
Status: DISCONTINUED | OUTPATIENT
Start: 2020-01-15 | End: 2020-01-19 | Stop reason: HOSPADM

## 2020-01-15 RX ORDER — HYDROCODONE BITARTRATE AND ACETAMINOPHEN 5; 325 MG/1; MG/1
1 TABLET ORAL EVERY 6 HOURS PRN
Status: DISCONTINUED | OUTPATIENT
Start: 2020-01-15 | End: 2020-01-19 | Stop reason: HOSPADM

## 2020-01-15 RX ADMIN — SODIUM CHLORIDE, PRESERVATIVE FREE 10 ML: 5 INJECTION INTRAVENOUS at 21:55

## 2020-01-15 RX ADMIN — INSULIN HUMAN 7 UNITS: 100 INJECTION, SOLUTION PARENTERAL at 22:19

## 2020-01-15 RX ADMIN — LISINOPRIL 10 MG: 10 TABLET ORAL at 08:16

## 2020-01-15 RX ADMIN — INSULIN HUMAN 2 UNITS: 100 INJECTION, SOLUTION PARENTERAL at 08:17

## 2020-01-15 RX ADMIN — CEFTRIAXONE SODIUM 2 G: 2 INJECTION, SOLUTION INTRAVENOUS at 17:15

## 2020-01-15 RX ADMIN — INSULIN HUMAN 8 UNITS: 100 INJECTION, SOLUTION PARENTERAL at 17:16

## 2020-01-15 RX ADMIN — INSULIN HUMAN 7 UNITS: 100 INJECTION, SOLUTION PARENTERAL at 12:13

## 2020-01-15 RX ADMIN — SODIUM CHLORIDE, PRESERVATIVE FREE 10 ML: 5 INJECTION INTRAVENOUS at 08:17

## 2020-01-16 LAB
BACTERIA SPEC AEROBE CULT: NORMAL
BACTERIA SPEC AEROBE CULT: NORMAL
GLUCOSE BLDC GLUCOMTR-MCNC: 199 MG/DL (ref 70–130)
GLUCOSE BLDC GLUCOMTR-MCNC: 229 MG/DL (ref 70–130)
GLUCOSE BLDC GLUCOMTR-MCNC: 344 MG/DL (ref 70–130)
GLUCOSE BLDC GLUCOMTR-MCNC: 364 MG/DL (ref 70–130)
GLUCOSE BLDC GLUCOMTR-MCNC: 374 MG/DL (ref 70–130)

## 2020-01-16 PROCEDURE — 25010000003 CEFTRIAXONE PER 250 MG: Performed by: INTERNAL MEDICINE

## 2020-01-16 PROCEDURE — 82962 GLUCOSE BLOOD TEST: CPT

## 2020-01-16 PROCEDURE — 94799 UNLISTED PULMONARY SVC/PX: CPT

## 2020-01-16 PROCEDURE — 63710000001 INSULIN REGULAR HUMAN PER 5 UNITS: Performed by: INTERNAL MEDICINE

## 2020-01-16 RX ORDER — AMOXICILLIN AND CLAVULANATE POTASSIUM 875; 125 MG/1; MG/1
1 TABLET, FILM COATED ORAL 2 TIMES DAILY
Qty: 18 TABLET | Refills: 0 | Status: SHIPPED | OUTPATIENT
Start: 2020-01-16 | End: 2020-01-25

## 2020-01-16 RX ADMIN — ACETAMINOPHEN 650 MG: 325 TABLET, FILM COATED ORAL at 15:23

## 2020-01-16 RX ADMIN — INSULIN HUMAN 7 UNITS: 100 INJECTION, SOLUTION PARENTERAL at 12:05

## 2020-01-16 RX ADMIN — CEFTRIAXONE SODIUM 2 G: 2 INJECTION, SOLUTION INTRAVENOUS at 17:08

## 2020-01-16 RX ADMIN — INSULIN HUMAN 8 UNITS: 100 INJECTION, SOLUTION PARENTERAL at 17:08

## 2020-01-16 RX ADMIN — SODIUM CHLORIDE, PRESERVATIVE FREE 10 ML: 5 INJECTION INTRAVENOUS at 21:13

## 2020-01-16 RX ADMIN — INSULIN HUMAN 4 UNITS: 100 INJECTION, SOLUTION PARENTERAL at 21:12

## 2020-01-16 RX ADMIN — LISINOPRIL 10 MG: 10 TABLET ORAL at 08:55

## 2020-01-16 RX ADMIN — INSULIN HUMAN 2 UNITS: 100 INJECTION, SOLUTION PARENTERAL at 08:55

## 2020-01-17 LAB
GLUCOSE BLDC GLUCOMTR-MCNC: 163 MG/DL (ref 70–130)
GLUCOSE BLDC GLUCOMTR-MCNC: 207 MG/DL (ref 70–130)
GLUCOSE BLDC GLUCOMTR-MCNC: 268 MG/DL (ref 70–130)
GLUCOSE BLDC GLUCOMTR-MCNC: 306 MG/DL (ref 70–130)

## 2020-01-17 PROCEDURE — 97110 THERAPEUTIC EXERCISES: CPT

## 2020-01-17 PROCEDURE — 63710000001 INSULIN GLARGINE PER 5 UNITS: Performed by: INTERNAL MEDICINE

## 2020-01-17 PROCEDURE — 63710000001 INSULIN REGULAR HUMAN PER 5 UNITS: Performed by: INTERNAL MEDICINE

## 2020-01-17 PROCEDURE — 82962 GLUCOSE BLOOD TEST: CPT

## 2020-01-17 RX ORDER — AMOXICILLIN AND CLAVULANATE POTASSIUM 875; 125 MG/1; MG/1
1 TABLET, FILM COATED ORAL EVERY 12 HOURS SCHEDULED
Status: DISCONTINUED | OUTPATIENT
Start: 2020-01-17 | End: 2020-01-19 | Stop reason: HOSPADM

## 2020-01-17 RX ORDER — HYDRALAZINE HYDROCHLORIDE 20 MG/ML
10 INJECTION INTRAMUSCULAR; INTRAVENOUS EVERY 6 HOURS PRN
Status: DISCONTINUED | OUTPATIENT
Start: 2020-01-17 | End: 2020-01-19 | Stop reason: HOSPADM

## 2020-01-17 RX ORDER — HYDROCHLOROTHIAZIDE 25 MG/1
25 TABLET ORAL DAILY
Status: DISCONTINUED | OUTPATIENT
Start: 2020-01-17 | End: 2020-01-19 | Stop reason: HOSPADM

## 2020-01-17 RX ORDER — LISINOPRIL 40 MG/1
40 TABLET ORAL DAILY
Status: DISCONTINUED | OUTPATIENT
Start: 2020-01-18 | End: 2020-01-19 | Stop reason: HOSPADM

## 2020-01-17 RX ORDER — INSULIN GLARGINE 100 [IU]/ML
20 INJECTION, SOLUTION SUBCUTANEOUS EVERY MORNING
Status: DISCONTINUED | OUTPATIENT
Start: 2020-01-17 | End: 2020-01-19 | Stop reason: HOSPADM

## 2020-01-17 RX ADMIN — SODIUM CHLORIDE, PRESERVATIVE FREE 10 ML: 5 INJECTION INTRAVENOUS at 21:31

## 2020-01-17 RX ADMIN — INSULIN HUMAN 2 UNITS: 100 INJECTION, SOLUTION PARENTERAL at 21:31

## 2020-01-17 RX ADMIN — ACETAMINOPHEN 650 MG: 325 TABLET, FILM COATED ORAL at 17:30

## 2020-01-17 RX ADMIN — LISINOPRIL 10 MG: 10 TABLET ORAL at 08:37

## 2020-01-17 RX ADMIN — AMOXICILLIN AND CLAVULANATE POTASSIUM 1 TABLET: 875; 125 TABLET, FILM COATED ORAL at 18:55

## 2020-01-17 RX ADMIN — INSULIN GLARGINE 20 UNITS: 100 INJECTION, SOLUTION SUBCUTANEOUS at 14:08

## 2020-01-17 RX ADMIN — METFORMIN HYDROCHLORIDE 1000 MG: 1000 TABLET ORAL at 21:32

## 2020-01-17 RX ADMIN — INSULIN HUMAN 6 UNITS: 100 INJECTION, SOLUTION PARENTERAL at 17:30

## 2020-01-17 RX ADMIN — SODIUM CHLORIDE, PRESERVATIVE FREE 10 ML: 5 INJECTION INTRAVENOUS at 08:37

## 2020-01-17 RX ADMIN — INSULIN HUMAN 7 UNITS: 100 INJECTION, SOLUTION PARENTERAL at 11:33

## 2020-01-17 RX ADMIN — HYDROCHLOROTHIAZIDE 25 MG: 25 TABLET ORAL at 14:08

## 2020-01-17 RX ADMIN — INSULIN HUMAN 4 UNITS: 100 INJECTION, SOLUTION PARENTERAL at 08:36

## 2020-01-18 VITALS
BODY MASS INDEX: 19.65 KG/M2 | WEIGHT: 125.22 LBS | SYSTOLIC BLOOD PRESSURE: 154 MMHG | OXYGEN SATURATION: 96 % | RESPIRATION RATE: 18 BRPM | DIASTOLIC BLOOD PRESSURE: 62 MMHG | HEART RATE: 71 BPM | TEMPERATURE: 98.1 F | HEIGHT: 67 IN

## 2020-01-18 LAB
GLUCOSE BLDC GLUCOMTR-MCNC: 123 MG/DL (ref 70–130)
GLUCOSE BLDC GLUCOMTR-MCNC: 172 MG/DL (ref 70–130)
GLUCOSE BLDC GLUCOMTR-MCNC: 299 MG/DL (ref 70–130)

## 2020-01-18 PROCEDURE — 82962 GLUCOSE BLOOD TEST: CPT

## 2020-01-18 PROCEDURE — 63710000001 INSULIN GLARGINE PER 5 UNITS: Performed by: INTERNAL MEDICINE

## 2020-01-18 PROCEDURE — 63710000001 INSULIN REGULAR HUMAN PER 5 UNITS: Performed by: INTERNAL MEDICINE

## 2020-01-18 RX ORDER — HYDROCHLOROTHIAZIDE 25 MG/1
25 TABLET ORAL DAILY
Qty: 30 TABLET | Refills: 0 | Status: SHIPPED | OUTPATIENT
Start: 2020-01-19 | End: 2020-02-11 | Stop reason: SDUPTHER

## 2020-01-18 RX ORDER — LISINOPRIL 40 MG/1
40 TABLET ORAL DAILY
Qty: 30 TABLET | Refills: 11 | Status: SHIPPED | OUTPATIENT
Start: 2020-01-18 | End: 2020-02-11 | Stop reason: SDUPTHER

## 2020-01-18 RX ADMIN — LISINOPRIL 40 MG: 40 TABLET ORAL at 08:10

## 2020-01-18 RX ADMIN — HYDROCHLOROTHIAZIDE 25 MG: 25 TABLET ORAL at 08:10

## 2020-01-18 RX ADMIN — METFORMIN HYDROCHLORIDE 1000 MG: 1000 TABLET ORAL at 08:09

## 2020-01-18 RX ADMIN — INSULIN GLARGINE 20 UNITS: 100 INJECTION, SOLUTION SUBCUTANEOUS at 08:10

## 2020-01-18 RX ADMIN — INSULIN HUMAN 6 UNITS: 100 INJECTION, SOLUTION PARENTERAL at 08:10

## 2020-01-18 RX ADMIN — INSULIN HUMAN 2 UNITS: 100 INJECTION, SOLUTION PARENTERAL at 12:01

## 2020-01-18 RX ADMIN — METFORMIN HYDROCHLORIDE 1000 MG: 1000 TABLET ORAL at 17:56

## 2020-01-18 RX ADMIN — AMOXICILLIN AND CLAVULANATE POTASSIUM 1 TABLET: 875; 125 TABLET, FILM COATED ORAL at 08:10

## 2020-01-18 RX ADMIN — SODIUM CHLORIDE, PRESERVATIVE FREE 10 ML: 5 INJECTION INTRAVENOUS at 08:14

## 2020-01-19 ENCOUNTER — READMISSION MANAGEMENT (OUTPATIENT)
Dept: CALL CENTER | Facility: HOSPITAL | Age: 80
End: 2020-01-19

## 2020-01-19 NOTE — OUTREACH NOTE
Prep Survey      Responses   Facility patient discharged from?  [facility]   Is patient eligible?  No   What are the reasons patient is not eligible?  [facility] [place]   Discharge diagnosis  Pyelonephritis, Sepsis, Klebsiella Pyelonephrosis, s/p cysto with ureteral stent insertion, Urolithiasis, recent remocal of urinaty stent, acute encephalopathy D/T sepsis, TIAGO, DM    Does the patient have one of the following disease processes/diagnoses(primary or secondary)?  Sepsis   Prep survey completed?  Yes          Milagros Ferguson RN

## 2020-01-20 ENCOUNTER — APPOINTMENT (OUTPATIENT)
Dept: CT IMAGING | Facility: HOSPITAL | Age: 80
End: 2020-01-20

## 2020-01-20 ENCOUNTER — HOSPITAL ENCOUNTER (EMERGENCY)
Facility: HOSPITAL | Age: 80
Discharge: NURSING, REHAB OR LONG-TERM CARE | End: 2020-01-20
Attending: EMERGENCY MEDICINE | Admitting: EMERGENCY MEDICINE

## 2020-01-20 VITALS
RESPIRATION RATE: 16 BRPM | HEIGHT: 67 IN | WEIGHT: 132 LBS | DIASTOLIC BLOOD PRESSURE: 79 MMHG | TEMPERATURE: 97.9 F | SYSTOLIC BLOOD PRESSURE: 167 MMHG | BODY MASS INDEX: 20.72 KG/M2 | OXYGEN SATURATION: 97 % | HEART RATE: 68 BPM

## 2020-01-20 DIAGNOSIS — S22.080A COMPRESSION FRACTURE OF T12 VERTEBRA, INITIAL ENCOUNTER (HCC): Primary | ICD-10-CM

## 2020-01-20 DIAGNOSIS — I10 ESSENTIAL HYPERTENSION: ICD-10-CM

## 2020-01-20 PROCEDURE — 99284 EMERGENCY DEPT VISIT MOD MDM: CPT

## 2020-01-20 PROCEDURE — 72131 CT LUMBAR SPINE W/O DYE: CPT

## 2020-01-20 RX ORDER — ONDANSETRON 4 MG/1
4 TABLET, ORALLY DISINTEGRATING ORAL ONCE
Status: COMPLETED | OUTPATIENT
Start: 2020-01-20 | End: 2020-01-20

## 2020-01-20 RX ORDER — HYDROCODONE BITARTRATE AND ACETAMINOPHEN 5; 325 MG/1; MG/1
1 TABLET ORAL EVERY 8 HOURS PRN
Qty: 20 TABLET | Refills: 0 | Status: SHIPPED | OUTPATIENT
Start: 2020-01-20 | End: 2020-02-11

## 2020-01-20 RX ORDER — HYDROCODONE BITARTRATE AND ACETAMINOPHEN 7.5; 325 MG/1; MG/1
1 TABLET ORAL ONCE
Status: COMPLETED | OUTPATIENT
Start: 2020-01-20 | End: 2020-01-20

## 2020-01-20 RX ORDER — LISINOPRIL 10 MG/1
10 TABLET ORAL ONCE
Status: COMPLETED | OUTPATIENT
Start: 2020-01-20 | End: 2020-01-20

## 2020-01-20 RX ORDER — ONDANSETRON 4 MG/1
4 TABLET, ORALLY DISINTEGRATING ORAL EVERY 6 HOURS PRN
Qty: 10 TABLET | Refills: 0 | Status: SHIPPED | OUTPATIENT
Start: 2020-01-20 | End: 2020-03-18 | Stop reason: SDUPTHER

## 2020-01-20 RX ADMIN — LISINOPRIL 10 MG: 10 TABLET ORAL at 15:00

## 2020-01-20 RX ADMIN — HYDROCODONE BITARTRATE AND ACETAMINOPHEN 1 TABLET: 7.5; 325 TABLET ORAL at 13:54

## 2020-01-20 RX ADMIN — ONDANSETRON 4 MG: 4 TABLET, ORALLY DISINTEGRATING ORAL at 13:54

## 2020-01-20 NOTE — ED NOTES
[facility] not available to transport patient back to nursing home at this time.       Shantell Hebert RN  01/20/20 5598

## 2020-01-20 NOTE — ED NOTES
CCP to speak with family at bedside regarding transport home.     Shantell Hebert, RN  01/20/20 5831

## 2020-01-20 NOTE — DISCHARGE INSTRUCTIONS
Take pain medicine as directed and take your blood pressure medications every day.  Please return to the emergency department if symptoms worsen with increasing pain or fever.

## 2020-01-20 NOTE — ED TRIAGE NOTES
Pt brought in by [facility] from [place]. This morning, pt fell after losing balance in the bathroom. Pt hit the back of his head on the ground. Pt c/o mid back pain on movement, headache, and nausea. Denies loc, no blood thinner.

## 2020-01-20 NOTE — ED PROVIDER NOTES
EMERGENCY DEPARTMENT ENCOUNTER    CHIEF COMPLAINT  Chief Complaint: Back pain   History given by: Patient   History limited by: Pt is a poor historian   Room Number: 36/36  PMD: Shantell Alvarez, ADRIEN      HPI:  Pt is a 79 y.o. male who presents via EMS from [place] complaining of constant, moderate lower back pain that began earlier today s/p mechanical fall. Pt states he was in the bathroom and lost his balance causing him to fall backwards and hit the back of his head. He denies LOC, nausea, vomiting and he is not anticoagulated. Pt denies numbness or weakness in extremities, headache or neck pain. Pt reports a hx of DM.     Duration:  Began earlier today   Onset: Sudden  Timing: Constant   Location: Lower back   Radiation: None  Quality: Pain  Intensity/Severity: Moderate   Progression: Unchanged   Associated Symptoms: None  Aggravating Factors: None  Alleviating Factors: None  Previous Episodes: None  Treatment before arrival: None    PAST MEDICAL HISTORY  Active Ambulatory Problems     Diagnosis Date Noted   • Anxiety disorder 12/18/2015   • Depression 12/18/2015   • Constipation 12/18/2015   • Diabetes type 2, uncontrolled (CMS/[facility]) 12/18/2015   • GERD (gastroesophageal reflux disease) 12/18/2015   • Hyperlipidemia 12/18/2015   • Low back pain 12/18/2015   • Prostatitis 12/18/2015   • Pulmonary nodule 12/18/2015   • Diabetic peripheral neuropathy (CMS/[facility]) 02/22/2016   • Chronic fatigue 02/22/2016   • Vitamin D deficiency 05/24/2016   • Noncompliance of patient with dietary regimen 05/24/2016   • Uncontrolled type 2 diabetes mellitus (CMS/[facility]) 10/04/2016   • Constipation 03/07/2017   • Leg pain 03/07/2017   • Benign essential HTN 07/27/2018   • Noncompliance with medications 07/27/2018   • Perirectal abscess 02/01/2019   • Anxiety 02/01/2019   • CKD (chronic kidney disease) stage 3, GFR 30-59 ml/min (CMS/[facility]) 07/29/2019   • Anemia of chronic disease 07/29/2019   • Low hemoglobin 07/29/2019   • PAD  (peripheral artery disease) (CMS/[facility]) 07/29/2019   • Polyp of colon 07/29/2019   • Pyelonephritis 01/11/2020     Resolved Ambulatory Problems     Diagnosis Date Noted   • No Resolved Ambulatory Problems     Past Medical History:   Diagnosis Date   • Type 2 diabetes mellitus (CMS/[facility])        PAST SURGICAL HISTORY  Past Surgical History:   Procedure Laterality Date   • BACK SURGERY     • CYSTOSCOPY W/ URETERAL STENT PLACEMENT Left 1/11/2020    Procedure: CYSTOSCOPY URETERAL CATHETER/STENT INSERTION;  Surgeon: Clark Ramirez MD;  Location: [facility];  Service: Urology   • KIDNEY STONE SURGERY     • URINARY SURGERY      stent placement       FAMILY HISTORY  History reviewed. No pertinent family history.    SOCIAL HISTORY  Social History     Socioeconomic History   • Marital status:      Spouse name: Not on file   • Number of children: Not on file   • Years of education: Not on file   • Highest education level: Not on file   Tobacco Use   • Smoking status: Current Every Day Smoker     Types: Cigars   • Smokeless tobacco: Never Used   Substance and Sexual Activity   • Alcohol use: No   • Drug use: No   • Sexual activity: Defer       ALLERGIES  Flexeril [cyclobenzaprine]    REVIEW OF SYSTEMS  Review of Systems   Constitutional: Negative for activity change, appetite change and fever.   HENT: Negative for congestion and sore throat.    Eyes: Negative.    Respiratory: Negative for cough and shortness of breath.    Cardiovascular: Negative for chest pain and leg swelling.   Gastrointestinal: Negative for abdominal pain, diarrhea and vomiting.   Endocrine: Negative.    Genitourinary: Negative for decreased urine volume and dysuria.   Musculoskeletal: Positive for back pain. Negative for neck pain.   Skin: Negative for rash and wound.   Allergic/Immunologic: Negative.    Neurological: Negative for weakness, numbness and headaches.   Hematological: Negative.    Psychiatric/Behavioral: Negative.    All other  systems reviewed and are negative.      PHYSICAL EXAM  ED Triage Vitals [01/20/20 1256]   Temp Heart Rate Resp BP SpO2   97.9 °F (36.6 °C) 74 16 (!) 220/98 98 %      Temp src Heart Rate Source Patient Position BP Location FiO2 (%)   -- Monitor -- -- --       Physical Exam   Constitutional: He is oriented to person, place, and time. No distress.   HENT:   Head: Normocephalic and atraumatic.   Head non-tender   Eyes: Pupils are equal, round, and reactive to light. EOM are normal.   Neck: Normal range of motion. Neck supple.   Cardiovascular: Normal rate, regular rhythm and normal heart sounds.   No murmur heard.  Pulmonary/Chest: Effort normal and breath sounds normal. No respiratory distress. He has no decreased breath sounds. He has no wheezes. He has no rhonchi. He has no rales.   Abdominal: Soft. Bowel sounds are normal. He exhibits no distension. There is no tenderness. There is no rebound and no guarding.   Musculoskeletal: Normal range of motion. He exhibits no edema.        Cervical back: He exhibits no tenderness.        Thoracic back: He exhibits no tenderness.        Lumbar back: He exhibits tenderness.   Bilateral hips non-tender with full ROM, no bruising to lumbar spine, limited ROM of lumbar spine secondary to pain   Neurological: He is alert and oriented to person, place, and time. He has normal sensation and normal strength.   Skin: Skin is warm and dry.   Psychiatric: Mood and affect normal.   Nursing note and vitals reviewed.      LAB RESULTS  Lab Results (last 24 hours)     ** No results found for the last 24 hours. **          I ordered the above labs and reviewed the results    RADIOLOGY  CT Lumbar Spine Without Contrast   Preliminary Result   1. Since CT scan abdomen and pelvis just 9 days ago on 01/11/2020, there   has been placement of a left ureteral stent from the upper pole calyceal   system left kidney, coursing down the left renal pelvis and ureter, and   its distal aspect is not assessed  on this exam, not evaluated. There are   multiple tiny calyceal stones in the calyces of the kidneys bilaterally.   2. There has been interval development of a mild acute compression   fracture involving superior body of the T12 thoracic vertebra, perhaps   10% loss of anterior, no loss of posterior vertebral body height,   minimal posterior buckling in the posterior cortex of the superior body   of T12. There is minimal if any canal narrowing. There is some   paravertebral edema. Findings are all new when compared to CT abdomen   and pelvis 9 days ago on 01/11/2020.    3. There is mild lumbar spondylosis as described with minimal canal   narrowing at L2-L3 and L3-L4. There has been surgery at L4-L5 with prior   left partial hemilaminectomy at L4, left medial facetectomy at L4-L5,   diffuse posterior disc bulge. There is mild canal and left lateral   recess and mild-to-moderate left foraminal narrowing. There is mild   right foraminal narrowing at L4-L5. The remainder of the lumbar spine CT   is unremarkable. The results were communicated to Dr. Beatty in the   emergency room by telephone on 01/20/2020 at 3:00 P.M.       Radiation dose reduction techniques were utilized, including automated   exposure control and exposure modulation based on body size.                   I ordered the above noted radiological studies. Interpreted by radiologist. Reviewed by me in PACS.       PROCEDURES  Procedures      PROGRESS AND CONSULTS  ED Course as of Jan 20 1636   Mon Jan 20, 2020   1505 Discussed with Dr. Medeiros with radiology.  Patient's CT of the lumbar spine reveals an acute mild compression fracture of T12 with mild canal stenosis.    [DE]   1534 I discussed with patient and his wife the results of the CT of the lumbar spine.  Patient's wife states that patient does not like to take his blood pressure medicine every day.  He thinks he only needs to take it if it gets elevated.  I told him that he needs to take his blood  pressure medications every day to control his blood pressure.  He was just recently in the hospital for sepsis and urinary tract infection with a chronic left ureteral stent.  I told patient and his wife that no neurosurgeon is going to operate on him at this time secondary to the underlying infection.  We will place patient on pain medications and given referral to his neurosurgeon.  They both understand the plan.    [DE]   1551 Patient's wife were made to discuss the case with his son.  We went over the results of his CAT scan, his medications and the plan for pain control and follow-up with surgery.  Patient's pain is currently under control and, at this point, I do not think he needs to be remain in the hospital for pain control of his back.    [DE]      ED Course User Index  [DE] Wilver Beatty MD         MEDICAL DECISION MAKING  Results were reviewed/discussed with the patient and they were also made aware of online access. Pt also made aware that some labs, such as cultures, will not be resulted during ER visit and follow up with PMD is necessary.     MDM  Number of Diagnoses or Management Options     Amount and/or Complexity of Data Reviewed  Tests in the radiology section of CPT®: ordered and reviewed  Independent visualization of images, tracings, or specimens: yes    Patient Progress  Patient progress: stable         DIAGNOSIS  Final diagnoses:   Compression fracture of T12 vertebra, initial encounter (CMS/[facility])   Essential hypertension       DISPOSITION  DISCHARGE    Patient discharged in stable condition.    Reviewed implications of results, diagnosis, meds, responsibility to follow up, warning signs and symptoms of possible worsening, potential complications and reasons to return to ER, including new or worsening symptoms including worsening pain, weakness or numbness of either leg.    Patient/Family voiced understanding of above instructions.    Discussed plan for discharge, as there is no emergent  indication for admission. Patient referred to primary care provider for BP management due to today's BP. Pt/family is agreeable and understands need for follow up and repeat testing.  Pt is aware that discharge does not mean that nothing is wrong but it indicates no emergency is present that requires admission and they must continue care with follow-up as given below or physician of their choice.     FOLLOW-UP  Shantell Alvarez, ADRIEN  [address]  [facility] [id number] [phone]    Schedule an appointment as soon as possible for a visit       Aamir Figueroa MD  [address]  [facility] [address]  [phone]    Schedule an appointment as soon as possible for a visit            Medication List      New Prescriptions    HYDROcodone-acetaminophen 5-325 MG per tablet  Commonly known as:  NORCO  Take 1 tablet by mouth Every 8 (Eight) Hours As Needed for Moderate Pain .     ondansetron ODT 4 MG disintegrating tablet  Commonly known as:  ZOFRAN-ODT  Take 1 tablet by mouth Every 6 (Six) Hours As Needed for Nausea.              Latest Documented Vital Signs:  As of 4:36 PM  BP- 167/79 HR- 68 Temp- 97.9 °F (36.6 °C) O2 sat- 97%    --  Documentation assistance provided by inocente eKller for .  Information recorded by the scribe was done at my direction and has been verified and validated by me.                Genesis Keller  01/20/20 7823       Wilver Beatty MD  01/20/20 0360

## 2020-01-24 LAB
CA PHOS CRY STONE QL IR: 10 %
COLOR STONE: NORMAL
COM CRY STONE QL IR: 80 %
COMPN STONE: NORMAL
Lab: NORMAL
Lab: NORMAL
NIDUS STONE QL: NORMAL
PATH REPORT.COMMENTS IMP SPEC: NORMAL
SIZE STONE: NORMAL MM
URATE MFR STONE: 10 %
WT STONE: 13 MG

## 2020-01-25 PROCEDURE — 80048 BASIC METABOLIC PNL TOTAL CA: CPT

## 2020-01-25 PROCEDURE — 85025 COMPLETE CBC W/AUTO DIFF WBC: CPT

## 2020-01-26 ENCOUNTER — LAB REQUISITION (OUTPATIENT)
Dept: LAB | Facility: HOSPITAL | Age: 80
End: 2020-01-26

## 2020-01-26 DIAGNOSIS — Z00.00 ROUTINE GENERAL MEDICAL EXAMINATION AT A HEALTH CARE FACILITY: ICD-10-CM

## 2020-01-26 LAB
ANION GAP SERPL CALCULATED.3IONS-SCNC: 17.6 MMOL/L (ref 5–15)
BASOPHILS # BLD AUTO: 0.03 10*3/MM3 (ref 0–0.2)
BASOPHILS NFR BLD AUTO: 0.4 % (ref 0–1.5)
BUN BLD-MCNC: 41 MG/DL (ref 8–23)
BUN/CREAT SERPL: 31.1 (ref 7–25)
CALCIUM SPEC-SCNC: 9.1 MG/DL (ref 8.6–10.5)
CHLORIDE SERPL-SCNC: 93 MMOL/L (ref 98–107)
CO2 SERPL-SCNC: 24.4 MMOL/L (ref 22–29)
CREAT BLD-MCNC: 1.32 MG/DL (ref 0.76–1.27)
DEPRECATED RDW RBC AUTO: 45.2 FL (ref 37–54)
EOSINOPHIL # BLD AUTO: 0.31 10*3/MM3 (ref 0–0.4)
EOSINOPHIL NFR BLD AUTO: 4.4 % (ref 0.3–6.2)
ERYTHROCYTE [DISTWIDTH] IN BLOOD BY AUTOMATED COUNT: 13 % (ref 12.3–15.4)
GFR SERPL CREATININE-BSD FRML MDRD: 53 ML/MIN/1.73
GFR SERPL CREATININE-BSD FRML MDRD: 64 ML/MIN/1.73
GLUCOSE BLD-MCNC: 245 MG/DL (ref 65–99)
HCT VFR BLD AUTO: 33 % (ref 37.5–51)
HGB BLD-MCNC: 10.8 G/DL (ref 13–17.7)
IMM GRANULOCYTES # BLD AUTO: 0.03 10*3/MM3 (ref 0–0.05)
IMM GRANULOCYTES NFR BLD AUTO: 0.4 % (ref 0–0.5)
LYMPHOCYTES # BLD AUTO: 1.57 10*3/MM3 (ref 0.7–3.1)
LYMPHOCYTES NFR BLD AUTO: 22.5 % (ref 19.6–45.3)
MCH RBC QN AUTO: 30.9 PG (ref 26.6–33)
MCHC RBC AUTO-ENTMCNC: 32.7 G/DL (ref 31.5–35.7)
MCV RBC AUTO: 94.3 FL (ref 79–97)
MONOCYTES # BLD AUTO: 0.59 10*3/MM3 (ref 0.1–0.9)
MONOCYTES NFR BLD AUTO: 8.5 % (ref 5–12)
NEUTROPHILS # BLD AUTO: 4.44 10*3/MM3 (ref 1.7–7)
NEUTROPHILS NFR BLD AUTO: 63.8 % (ref 42.7–76)
NRBC BLD AUTO-RTO: 0 /100 WBC (ref 0–0.2)
PLATELET # BLD AUTO: 297 10*3/MM3 (ref 140–450)
PMV BLD AUTO: 10.5 FL (ref 6–12)
POTASSIUM BLD-SCNC: 5 MMOL/L (ref 3.5–5.2)
RBC # BLD AUTO: 3.5 10*6/MM3 (ref 4.14–5.8)
SODIUM BLD-SCNC: 135 MMOL/L (ref 136–145)
WBC NRBC COR # BLD: 6.97 10*3/MM3 (ref 3.4–10.8)

## 2020-02-11 ENCOUNTER — OFFICE VISIT (OUTPATIENT)
Dept: FAMILY MEDICINE CLINIC | Facility: CLINIC | Age: 80
End: 2020-02-11

## 2020-02-11 VITALS
HEIGHT: 67 IN | SYSTOLIC BLOOD PRESSURE: 132 MMHG | DIASTOLIC BLOOD PRESSURE: 78 MMHG | HEART RATE: 78 BPM | TEMPERATURE: 98.6 F | WEIGHT: 124 LBS | BODY MASS INDEX: 19.46 KG/M2 | OXYGEN SATURATION: 98 % | RESPIRATION RATE: 16 BRPM

## 2020-02-11 DIAGNOSIS — Z72.0 TOBACCO USE: ICD-10-CM

## 2020-02-11 DIAGNOSIS — R53.1 WEAKNESS: ICD-10-CM

## 2020-02-11 DIAGNOSIS — E78.49 OTHER HYPERLIPIDEMIA: ICD-10-CM

## 2020-02-11 DIAGNOSIS — R53.81 PHYSICAL DECONDITIONING: ICD-10-CM

## 2020-02-11 DIAGNOSIS — R53.82 CHRONIC FATIGUE: ICD-10-CM

## 2020-02-11 DIAGNOSIS — Z91.119 NONCOMPLIANCE OF PATIENT WITH DIETARY REGIMEN: ICD-10-CM

## 2020-02-11 DIAGNOSIS — N18.30 CKD (CHRONIC KIDNEY DISEASE) STAGE 3, GFR 30-59 ML/MIN (HCC): ICD-10-CM

## 2020-02-11 DIAGNOSIS — E11.65 UNCONTROLLED TYPE 2 DIABETES MELLITUS WITH HYPERGLYCEMIA (HCC): ICD-10-CM

## 2020-02-11 DIAGNOSIS — E11.42 DIABETIC PERIPHERAL NEUROPATHY (HCC): ICD-10-CM

## 2020-02-11 DIAGNOSIS — F41.1 GENERALIZED ANXIETY DISORDER: ICD-10-CM

## 2020-02-11 DIAGNOSIS — E55.9 VITAMIN D DEFICIENCY: ICD-10-CM

## 2020-02-11 DIAGNOSIS — Z78.9 DECREASED ACTIVITIES OF DAILY LIVING (ADL): ICD-10-CM

## 2020-02-11 DIAGNOSIS — F41.9 ANXIETY: ICD-10-CM

## 2020-02-11 DIAGNOSIS — N41.9 PROSTATITIS, UNSPECIFIED PROSTATITIS TYPE: ICD-10-CM

## 2020-02-11 DIAGNOSIS — IMO0001 UNCONTROLLED DIABETES MELLITUS TYPE 2 WITHOUT COMPLICATIONS: ICD-10-CM

## 2020-02-11 DIAGNOSIS — F33.42 RECURRENT MAJOR DEPRESSIVE DISORDER, IN FULL REMISSION (HCC): ICD-10-CM

## 2020-02-11 DIAGNOSIS — Z91.14 NONCOMPLIANCE WITH MEDICATIONS: ICD-10-CM

## 2020-02-11 DIAGNOSIS — D64.9 LOW HEMOGLOBIN: ICD-10-CM

## 2020-02-11 DIAGNOSIS — I73.9 PAD (PERIPHERAL ARTERY DISEASE) (HCC): ICD-10-CM

## 2020-02-11 DIAGNOSIS — R93.89 ABNORMAL CHEST CT: ICD-10-CM

## 2020-02-11 DIAGNOSIS — Z09 HOSPITAL DISCHARGE FOLLOW-UP: Primary | ICD-10-CM

## 2020-02-11 DIAGNOSIS — S20.211D CONTUSION OF RIB ON RIGHT SIDE, SUBSEQUENT ENCOUNTER: ICD-10-CM

## 2020-02-11 DIAGNOSIS — Z72.0 TOBACCO ABUSE: ICD-10-CM

## 2020-02-11 DIAGNOSIS — I10 BENIGN ESSENTIAL HTN: ICD-10-CM

## 2020-02-11 DIAGNOSIS — R42 DIZZINESS: ICD-10-CM

## 2020-02-11 DIAGNOSIS — K21.00 GASTROESOPHAGEAL REFLUX DISEASE WITH ESOPHAGITIS: ICD-10-CM

## 2020-02-11 DIAGNOSIS — R63.4 WEIGHT LOSS: ICD-10-CM

## 2020-02-11 DIAGNOSIS — D63.8 ANEMIA OF CHRONIC DISEASE: ICD-10-CM

## 2020-02-11 PROBLEM — E11.22 TYPE 2 DIABETES MELLITUS WITH DIABETIC CHRONIC KIDNEY DISEASE (HCC): Status: ACTIVE | Noted: 2019-08-29

## 2020-02-11 PROBLEM — R31.9 HEMATURIA: Status: ACTIVE | Noted: 2019-10-29

## 2020-02-11 PROBLEM — S20.211A CONTUSION OF RIB ON RIGHT SIDE: Status: ACTIVE | Noted: 2020-02-11

## 2020-02-11 PROCEDURE — 99214 OFFICE O/P EST MOD 30 MIN: CPT | Performed by: PHYSICIAN ASSISTANT

## 2020-02-11 RX ORDER — AMLODIPINE BESYLATE 5 MG/1
TABLET ORAL DAILY
COMMUNITY
Start: 2020-02-09 | End: 2020-02-11 | Stop reason: SDUPTHER

## 2020-02-11 RX ORDER — TAMSULOSIN HYDROCHLORIDE 0.4 MG/1
1 CAPSULE ORAL DAILY
Qty: 90 CAPSULE | Refills: 1 | Status: SHIPPED | OUTPATIENT
Start: 2020-02-11 | End: 2020-03-18 | Stop reason: SDUPTHER

## 2020-02-11 RX ORDER — AMLODIPINE BESYLATE 5 MG/1
5 TABLET ORAL DAILY
Qty: 90 TABLET | Refills: 1 | Status: SHIPPED | OUTPATIENT
Start: 2020-02-11 | End: 2020-03-18 | Stop reason: SDUPTHER

## 2020-02-11 RX ORDER — GLIMEPIRIDE 4 MG/1
4 TABLET ORAL
Qty: 90 TABLET | Refills: 1 | Status: SHIPPED | OUTPATIENT
Start: 2020-02-11 | End: 2020-03-18 | Stop reason: SDUPTHER

## 2020-02-11 RX ORDER — SIMVASTATIN 40 MG
40 TABLET ORAL NIGHTLY
Qty: 90 TABLET | Refills: 1 | Status: SHIPPED | OUTPATIENT
Start: 2020-02-11 | End: 2020-03-18 | Stop reason: SDUPTHER

## 2020-02-11 RX ORDER — INSULIN GLARGINE 100 [IU]/ML
INJECTION, SOLUTION SUBCUTANEOUS
COMMUNITY
Start: 2020-02-09 | End: 2020-02-11 | Stop reason: SDUPTHER

## 2020-02-11 RX ORDER — BLOOD PRESSURE TEST KIT
KIT MISCELLANEOUS
Qty: 300 EACH | Refills: 1 | Status: SHIPPED | OUTPATIENT
Start: 2020-02-11

## 2020-02-11 RX ORDER — TAMSULOSIN HYDROCHLORIDE 0.4 MG/1
CAPSULE ORAL
COMMUNITY
Start: 2020-02-09 | End: 2020-02-11 | Stop reason: SDUPTHER

## 2020-02-11 RX ORDER — ESCITALOPRAM OXALATE 20 MG/1
20 TABLET ORAL DAILY
Qty: 90 TABLET | Refills: 1 | Status: SHIPPED | OUTPATIENT
Start: 2020-02-11 | End: 2020-03-18 | Stop reason: SDUPTHER

## 2020-02-11 RX ORDER — ASPIRIN 81 MG/1
TABLET, COATED ORAL DAILY
COMMUNITY
Start: 2020-02-09 | End: 2020-09-02

## 2020-02-11 RX ORDER — HYDROCHLOROTHIAZIDE 25 MG/1
25 TABLET ORAL DAILY
Qty: 90 TABLET | Refills: 1 | Status: SHIPPED | OUTPATIENT
Start: 2020-02-11 | End: 2020-03-18 | Stop reason: SDUPTHER

## 2020-02-11 RX ORDER — INSULIN GLARGINE 100 [IU]/ML
5 INJECTION, SOLUTION SUBCUTANEOUS DAILY
Qty: 12 PEN | Refills: 1 | Status: SHIPPED | OUTPATIENT
Start: 2020-02-11 | End: 2020-02-28 | Stop reason: SDUPTHER

## 2020-02-11 RX ORDER — LISINOPRIL 40 MG/1
40 TABLET ORAL DAILY
Qty: 90 TABLET | Refills: 1 | Status: SHIPPED | OUTPATIENT
Start: 2020-02-11 | End: 2020-03-18 | Stop reason: SDUPTHER

## 2020-02-11 NOTE — PROGRESS NOTES
Subjective   Courtney Medrano is a 79 y.o. male.     History of Present Illness   Courtney Medrano 79 y.o. male presents today for hosptial follow up.  he was treated 1-11 to 1- for sepsis  .  I reviewed all of the labs and diagnostic testing and noted:  See below  The patient's medications were not changed:  Current outpatient and discharge medications have been reconciled for the patient.  Reviewed by: Shantell Alvarez PA-C    he does have a follow up appointment with a specialist:     Lab Results   Component Value Date    HGBA1C 7.30 (H) 02/01/2019     He has appt Friday to see urologist---stent removed  I do want a note he did see Dr. Vignesh Babin for the anemia and weight loss and follow-up on colon polyps.  He had a EGD and colonoscopy on 12/27/2019.  He did have some polyps removed and he did have some erosion in his esophagus and some gastritis noted; his polyps were tubular adenomas, I also want a note his celiac sprue was negative  He then ordered after the scopes a CT of the abdomen and pelvis to make sure there is no further concerns for malignancy--- he did not show any malignancy on his CT  GERD controlled with Mylanta    I did have him see Dr. Fan for chronic kidney disease 10-29-10    BP controlled if takes med    I need him to see vascular doc ---has PAD  F/u with Endocrine  Eating less and weight down  Still need check if CT chest done; I did order for weight loss and he is smoker    Has portable toilet  Has urinal  Uses walker  I can see notes from his rehab at [facility] post discharge I have his list of medications and then I have an assessment of activities of daily living.  Need to note that he does need help with dressing he needs help of one person to ambulate and use his walker which is a device, he needs to have someone supervising him when he grooms because he is unsteady on his feet, he needs, he receives help to toilet and occasional incontinence is noted, requires help to  negotiate doorways and barriers, he requires physical presence of another person during transfer, assistance getting in and out of the tub  He also needs help with organizing his medications will need nursing care in the home  Will need physical therapy and occupational therapy evaluation and treat due to his deconditioning and his lightheadedness dizziness sometimes falls.     I did increase his Lexapro when I saw him last in October due to breakthrough depression and his anxiety was still breaking through.  I do report that this is working well at 20 mg and do intend on continuing this prescription at this dose  We also talked today about compliance in why he needs to stay on his blood pressure medicine.  He understands he cannot take his blood pressure medicine as needed  The following portions of the patient's history were reviewed and updated as appropriate: allergies, current medications, past family history, past medical history, past social history, past surgical history and problem list.    Review of Systems   Constitutional: Negative for activity change, appetite change and unexpected weight change.   HENT: Negative for nosebleeds and trouble swallowing.    Eyes: Negative for pain and visual disturbance.   Respiratory: Negative for chest tightness, shortness of breath and wheezing.    Cardiovascular: Negative for chest pain and palpitations.   Gastrointestinal: Negative for abdominal pain and blood in stool.   Endocrine: Negative.    Genitourinary: Negative for difficulty urinating and hematuria.   Musculoskeletal: Negative for joint swelling.   Skin: Negative for color change and rash.   Allergic/Immunologic: Negative.    Neurological: Negative for syncope and speech difficulty.   Hematological: Negative for adenopathy.   Psychiatric/Behavioral: Negative for agitation and confusion.   All other systems reviewed and are negative.      Objective   Physical Exam   Constitutional: He is oriented to person,  place, and time. He appears well-developed.   Looks weak   HENT:   Head: Normocephalic and atraumatic.   Eyes: Pupils are equal, round, and reactive to light. Conjunctivae and EOM are normal. Right eye exhibits no discharge. Left eye exhibits no discharge. No scleral icterus.   Neck: Normal range of motion. Neck supple. No tracheal deviation present. No thyromegaly present.   Cardiovascular: Normal rate, regular rhythm, normal heart sounds, intact distal pulses and normal pulses. Exam reveals no gallop.   No murmur heard.  Pulmonary/Chest: Effort normal and breath sounds normal. No respiratory distress. He has no wheezes. He has no rales.   Abdominal: Soft.   Musculoskeletal: Normal range of motion. He exhibits tenderness.   Right anterior costal margin from prior fall--this limits his ambulation he guards this area just to get up from a seated position and especially from a laying down position sore to touch and range of motion pain   Lymphadenopathy:     He has no cervical adenopathy.   Neurological: He is alert and oriented to person, place, and time. Coordination abnormal.   Skin: Skin is warm. No rash noted. No erythema. No pallor.   Psychiatric: He has a normal mood and affect. His behavior is normal. Judgment and thought content normal.   Nursing note and vitals reviewed.      Assessment/Plan   Courtney was seen today for hospital follow up.    Diagnoses and all orders for this visit:    Hospital discharge follow-up    Benign essential HTN    Anxiety    CKD (chronic kidney disease) stage 3, GFR 30-59 ml/min (CMS/[facility])    Anemia of chronic disease    Low hemoglobin    PAD (peripheral artery disease) (CMS/[facility])    Uncontrolled type 2 diabetes mellitus with hyperglycemia (CMS/[facility])    Gastroesophageal reflux disease with esophagitis    Tobacco use    Other hyperlipidemia    Dizziness    Physical deconditioning    Weakness    Weight loss    Tobacco abuse        I am printing the vascular office information for him  at [facility] which is now [facility] so we can follow-up on the peripheral artery disease, also to check his abdominal aorta  Need to follow-up with endocrinology for his diabetes  He is got a follow-up with urology  F/u nephrology

## 2020-02-11 NOTE — PATIENT INSTRUCTIONS
Fall Prevention in the Home, Adult  Falls can cause injuries and can affect people from all age groups. There are many simple things that you can do to make your home safe and to help prevent falls. Ask for help when making these changes, if needed.  What actions can I take to prevent falls?  General instructions  · Use good lighting in all rooms. Replace any light bulbs that burn out.  · Turn on lights if it is dark. Use night-lights.  · Place frequently used items in easy-to-reach places. Lower the shelves around your home if necessary.  · Set up furniture so that there are clear paths around it. Avoid moving your furniture around.  · Remove throw rugs and other tripping hazards from the floor.  · Avoid walking on wet floors.  · Fix any uneven floor surfaces.  · Add color or contrast paint or tape to grab bars and handrails in your home. Place contrasting color strips on the first and last steps of stairways.  · When you use a stepladder, make sure that it is completely opened and that the sides are firmly locked. Have someone hold the ladder while you are using it. Do not climb a closed stepladder.  · Be aware of any and all pets.  What can I do in the bathroom?         · Keep the floor dry. Immediately clean up any water that spills onto the floor.  · Remove soap buildup in the tub or shower on a regular basis.  · Use non-skid mats or decals on the floor of the tub or shower.  · Attach bath mats securely with double-sided, non-slip rug tape.  · If you need to sit down while you are in the shower, use a plastic, non-slip stool.  · Install grab bars by the toilet and in the tub and shower. Do not use towel bars as grab bars.  What can I do in the bedroom?  · Make sure that a bedside light is easy to reach.  · Do not use oversized bedding that drapes onto the floor.  · Have a firm chair that has side arms to use for getting dressed.  What can I do in the kitchen?  · Clean up any spills right away.  · If you need to  reach for something above you, use a sturdy step stool that has a grab bar.  · Keep electrical cables out of the way.  · Do not use floor polish or wax that makes floors slippery. If you must use wax, make sure that it is non-skid floor wax.  What can I do in the stairways?  · Do not leave any items on the stairs.  · Make sure that you have a light switch at the top of the stairs and the bottom of the stairs. Have them installed if you do not have them.  · Make sure that there are handrails on both sides of the stairs. Fix handrails that are broken or loose. Make sure that handrails are as long as the stairways.  · Install non-slip stair treads on all stairs in your home.  · Avoid having throw rugs at the top or bottom of stairways, or secure the rugs with carpet tape to prevent them from moving.  · Choose a carpet design that does not hide the edge of steps on the stairway.  · Check any carpeting to make sure that it is firmly attached to the stairs. Fix any carpet that is loose or worn.  What can I do on the outside of my home?  · Use bright outdoor lighting.  · Regularly repair the edges of walkways and driveways and fix any cracks.  · Remove high doorway thresholds.  · Trim any shrubbery on the main path into your home.  · Regularly check that handrails are securely fastened and in good repair. Both sides of any steps should have handrails.  · Install guardrails along the edges of any raised decks or porches.  · Clear walkways of debris and clutter, including tools and rocks.  · Have leaves, snow, and ice cleared regularly.  · Use sand or salt on walkways during winter months.  · In the garage, clean up any spills right away, including grease or oil spills.  What other actions can I take?  · Wear closed-toe shoes that fit well and support your feet. Wear shoes that have rubber soles or low heels.  · Use mobility aids as needed, such as canes, walkers, scooters, and crutches.  · Review your medicines with your  health care provider. Some medicines can cause dizziness or changes in blood pressure, which increase your risk of falling.  Talk with your health care provider about other ways that you can decrease your risk of falls. This may include working with a physical therapist or  to improve your strength, balance, and endurance.  Where to find more information  · Centers for Disease Control and Prevention, [place]: https://www.cdc.gov  · National [place] on Aging: https://ia1adzx.mark.nih.gov  Contact a health care provider if:  · You are afraid of falling at home.  · You feel weak, drowsy, or dizzy at home.  · You fall at home.  Summary  · There are many simple things that you can do to make your home safe and to help prevent falls.  · Ways to make your home safe include removing tripping hazards and installing grab bars in the bathroom.  · Ask for help when making these changes in your home.  This information is not intended to replace advice given to you by your health care provider. Make sure you discuss any questions you have with your health care provider.  Document Released: 2003 Document Revised: 2018 Document Reviewed: 2018  [employer] Interactive Patient Education © 2019 [employer]      Medicare Wellness  Personal Prevention Plan of Service     Date of Office Visit:  2020  Encounter Provider:  Shantell Alvarez PA-C  Place of Service:  [facility]  Patient Name: Courtney Medrano  :  1940    As part of the Medicare Wellness portion of your visit today, we are providing you with this personalized preventive plan of services (PPPS). This plan is based upon recommendations of the [place] Preventive Services Task Force (USPSTF) and the Advisory Committee on Immunization Practices (ACIP).    This lists the preventive care services that should be considered, and provides dates of when you are due. Items listed as completed are up-to-date and do  not require any further intervention.    Health Maintenance   Topic Date Due   • PNEUMOCOCCAL VACCINE (65+ HIGH RISK) (1 of 2 - PCV13) 04/02/2007   • MEDICARE ANNUAL WELLNESS  02/29/2016   • DIABETIC EYE EXAM  08/02/2018   • HEMOGLOBIN A1C  08/01/2019   • LIPID PANEL  02/01/2020   • ZOSTER VACCINE (1 of 2) 08/06/2020 (Originally 4/2/1992)   • URINE MICROALBUMIN  03/07/2020   • COLONOSCOPY  12/27/2022   • INFLUENZA VACCINE  Discontinued   • TDAP/TD VACCINES  Discontinued       Orders Placed This Encounter   Procedures   • CT Chest With Contrast     Standing Status:   Future     Standing Expiration Date:   2/11/2021   • Comprehensive metabolic panel   • Lipid panel   • TSH   • Hemoglobin A1c   • C-Peptide   • Ferritin   • Vitamin B12   • Folate   • Vitamin D 25 Hydroxy   • T3, Free   • T4, Free   • Iron Profile   • Ambulatory Referral to Vascular Surgery     Referral Priority:   Routine     Referral Type:   Consultation     Referral Reason:   Specialty Services Required     Requested Specialty:   Vascular Surgery     Number of Visits Requested:   1   • Ambulatory Referral to Endocrinology     Referral Priority:   Routine     Referral Type:   Consultation     Referral Reason:   Specialty Services Required     Referred to Provider:   Eva Nicole MD     Requested Specialty:   Endocrinology     Number of Visits Requested:   1   • Ambulatory Referral to Home Health     Referral Priority:   Routine     Referral Type:   Home Health     Referral Reason:   Specialty Services Required     Requested Specialty:   Home Health Services     Number of Visits Requested:   1   • CBC and Differential     Order Specific Question:   Manual Differential     Answer:   No       No follow-ups on file.

## 2020-02-19 ENCOUNTER — TELEPHONE (OUTPATIENT)
Dept: FAMILY MEDICINE CLINIC | Facility: CLINIC | Age: 80
End: 2020-02-19

## 2020-02-19 NOTE — TELEPHONE ENCOUNTER
Roberto (OT) with [facility]  called requesting verbal orders for 1 additional visit this week, then 2 x weekly for 3 weeks for ADLs.

## 2020-02-21 ENCOUNTER — APPOINTMENT (OUTPATIENT)
Dept: CT IMAGING | Facility: HOSPITAL | Age: 80
End: 2020-02-21

## 2020-02-24 ENCOUNTER — APPOINTMENT (OUTPATIENT)
Dept: LAB | Facility: HOSPITAL | Age: 80
End: 2020-02-24

## 2020-02-24 LAB
25(OH)D3 SERPL-MCNC: 89.4 NG/ML (ref 30–100)
ALBUMIN SERPL-MCNC: 3.9 G/DL (ref 3.5–5.2)
ALBUMIN/GLOB SERPL: 1.8 G/DL
ALP SERPL-CCNC: 78 U/L (ref 39–117)
ALT SERPL W P-5'-P-CCNC: <5 U/L (ref 1–41)
ANION GAP SERPL CALCULATED.3IONS-SCNC: 13.8 MMOL/L (ref 5–15)
AST SERPL-CCNC: 12 U/L (ref 1–40)
BASOPHILS # BLD AUTO: 0.05 10*3/MM3 (ref 0–0.2)
BASOPHILS NFR BLD AUTO: 0.6 % (ref 0–1.5)
BILIRUB SERPL-MCNC: 0.2 MG/DL (ref 0.2–1.2)
BUN BLD-MCNC: 29 MG/DL (ref 8–23)
BUN/CREAT SERPL: 23.2 (ref 7–25)
CALCIUM SPEC-SCNC: 9.9 MG/DL (ref 8.6–10.5)
CHLORIDE SERPL-SCNC: 101 MMOL/L (ref 98–107)
CHOLEST SERPL-MCNC: 128 MG/DL (ref 0–200)
CO2 SERPL-SCNC: 25.2 MMOL/L (ref 22–29)
CREAT BLD-MCNC: 1.25 MG/DL (ref 0.76–1.27)
DEPRECATED RDW RBC AUTO: 43.6 FL (ref 37–54)
EOSINOPHIL # BLD AUTO: 0.75 10*3/MM3 (ref 0–0.4)
EOSINOPHIL NFR BLD AUTO: 9.7 % (ref 0.3–6.2)
ERYTHROCYTE [DISTWIDTH] IN BLOOD BY AUTOMATED COUNT: 13 % (ref 12.3–15.4)
FERRITIN SERPL-MCNC: 66.7 NG/ML (ref 30–400)
FOLATE SERPL-MCNC: 15.7 NG/ML (ref 4.78–24.2)
GFR SERPL CREATININE-BSD FRML MDRD: 56 ML/MIN/1.73
GLOBULIN UR ELPH-MCNC: 2.2 GM/DL
GLUCOSE BLD-MCNC: 130 MG/DL (ref 65–99)
HBA1C MFR BLD: 8.36 % (ref 4.8–5.6)
HCT VFR BLD AUTO: 32.6 % (ref 37.5–51)
HDLC SERPL-MCNC: 43 MG/DL (ref 40–60)
HGB BLD-MCNC: 10.4 G/DL (ref 13–17.7)
IMM GRANULOCYTES # BLD AUTO: 0.03 10*3/MM3 (ref 0–0.05)
IMM GRANULOCYTES NFR BLD AUTO: 0.4 % (ref 0–0.5)
IRON 24H UR-MRATE: 56 MCG/DL (ref 59–158)
IRON SATN MFR SERPL: 16 % (ref 20–50)
LDLC SERPL CALC-MCNC: 42 MG/DL (ref 0–100)
LDLC/HDLC SERPL: 0.98 {RATIO}
LYMPHOCYTES # BLD AUTO: 2.14 10*3/MM3 (ref 0.7–3.1)
LYMPHOCYTES NFR BLD AUTO: 27.6 % (ref 19.6–45.3)
MCH RBC QN AUTO: 29.5 PG (ref 26.6–33)
MCHC RBC AUTO-ENTMCNC: 31.9 G/DL (ref 31.5–35.7)
MCV RBC AUTO: 92.6 FL (ref 79–97)
MONOCYTES # BLD AUTO: 0.5 10*3/MM3 (ref 0.1–0.9)
MONOCYTES NFR BLD AUTO: 6.4 % (ref 5–12)
NEUTROPHILS # BLD AUTO: 4.29 10*3/MM3 (ref 1.7–7)
NEUTROPHILS NFR BLD AUTO: 55.3 % (ref 42.7–76)
NRBC BLD AUTO-RTO: 0 /100 WBC (ref 0–0.2)
PLATELET # BLD AUTO: 246 10*3/MM3 (ref 140–450)
PMV BLD AUTO: 10.2 FL (ref 6–12)
POTASSIUM BLD-SCNC: 4.8 MMOL/L (ref 3.5–5.2)
PROT SERPL-MCNC: 6.1 G/DL (ref 6–8.5)
RBC # BLD AUTO: 3.52 10*6/MM3 (ref 4.14–5.8)
SODIUM BLD-SCNC: 140 MMOL/L (ref 136–145)
T3FREE SERPL-MCNC: 1.84 PG/ML (ref 2–4.4)
T4 FREE SERPL-MCNC: 1.31 NG/DL (ref 0.93–1.7)
TIBC SERPL-MCNC: 341 MCG/DL (ref 298–536)
TRANSFERRIN SERPL-MCNC: 229 MG/DL (ref 200–360)
TRIGL SERPL-MCNC: 214 MG/DL (ref 0–150)
TSH SERPL DL<=0.05 MIU/L-ACNC: 1.82 UIU/ML (ref 0.27–4.2)
VIT B12 BLD-MCNC: 746 PG/ML (ref 211–946)
VLDLC SERPL-MCNC: 42.8 MG/DL (ref 5–40)
WBC NRBC COR # BLD: 7.76 10*3/MM3 (ref 3.4–10.8)

## 2020-02-24 PROCEDURE — 84681 ASSAY OF C-PEPTIDE: CPT | Performed by: PHYSICIAN ASSISTANT

## 2020-02-24 PROCEDURE — 82746 ASSAY OF FOLIC ACID SERUM: CPT | Performed by: PHYSICIAN ASSISTANT

## 2020-02-24 PROCEDURE — 80053 COMPREHEN METABOLIC PANEL: CPT | Performed by: PHYSICIAN ASSISTANT

## 2020-02-24 PROCEDURE — 82728 ASSAY OF FERRITIN: CPT | Performed by: PHYSICIAN ASSISTANT

## 2020-02-24 PROCEDURE — 83036 HEMOGLOBIN GLYCOSYLATED A1C: CPT | Performed by: PHYSICIAN ASSISTANT

## 2020-02-24 PROCEDURE — 83540 ASSAY OF IRON: CPT | Performed by: PHYSICIAN ASSISTANT

## 2020-02-24 PROCEDURE — 84443 ASSAY THYROID STIM HORMONE: CPT | Performed by: PHYSICIAN ASSISTANT

## 2020-02-24 PROCEDURE — 82306 VITAMIN D 25 HYDROXY: CPT | Performed by: PHYSICIAN ASSISTANT

## 2020-02-24 PROCEDURE — 80061 LIPID PANEL: CPT | Performed by: PHYSICIAN ASSISTANT

## 2020-02-24 PROCEDURE — 84466 ASSAY OF TRANSFERRIN: CPT | Performed by: PHYSICIAN ASSISTANT

## 2020-02-24 PROCEDURE — 84439 ASSAY OF FREE THYROXINE: CPT | Performed by: PHYSICIAN ASSISTANT

## 2020-02-24 PROCEDURE — 84481 FREE ASSAY (FT-3): CPT | Performed by: PHYSICIAN ASSISTANT

## 2020-02-24 PROCEDURE — 82607 VITAMIN B-12: CPT | Performed by: PHYSICIAN ASSISTANT

## 2020-02-24 PROCEDURE — 85025 COMPLETE CBC W/AUTO DIFF WBC: CPT | Performed by: PHYSICIAN ASSISTANT

## 2020-02-24 PROCEDURE — 36415 COLL VENOUS BLD VENIPUNCTURE: CPT | Performed by: PHYSICIAN ASSISTANT

## 2020-02-26 ENCOUNTER — HOSPITAL ENCOUNTER (OUTPATIENT)
Dept: CT IMAGING | Facility: HOSPITAL | Age: 80
Discharge: HOME | End: 2020-02-26
Admitting: PHYSICIAN ASSISTANT

## 2020-02-26 DIAGNOSIS — E78.49 OTHER HYPERLIPIDEMIA: ICD-10-CM

## 2020-02-26 DIAGNOSIS — R53.1 WEAKNESS: ICD-10-CM

## 2020-02-26 DIAGNOSIS — Z91.119 NONCOMPLIANCE OF PATIENT WITH DIETARY REGIMEN: ICD-10-CM

## 2020-02-26 DIAGNOSIS — R42 DIZZINESS: ICD-10-CM

## 2020-02-26 DIAGNOSIS — I73.9 PAD (PERIPHERAL ARTERY DISEASE) (HCC): ICD-10-CM

## 2020-02-26 DIAGNOSIS — D63.8 ANEMIA OF CHRONIC DISEASE: ICD-10-CM

## 2020-02-26 DIAGNOSIS — IMO0001 UNCONTROLLED DIABETES MELLITUS TYPE 2 WITHOUT COMPLICATIONS: ICD-10-CM

## 2020-02-26 DIAGNOSIS — F41.1 GENERALIZED ANXIETY DISORDER: ICD-10-CM

## 2020-02-26 DIAGNOSIS — N18.30 CKD (CHRONIC KIDNEY DISEASE) STAGE 3, GFR 30-59 ML/MIN (HCC): ICD-10-CM

## 2020-02-26 DIAGNOSIS — E55.9 VITAMIN D DEFICIENCY: ICD-10-CM

## 2020-02-26 DIAGNOSIS — Z72.0 TOBACCO USE: ICD-10-CM

## 2020-02-26 DIAGNOSIS — K21.00 GASTROESOPHAGEAL REFLUX DISEASE WITH ESOPHAGITIS: ICD-10-CM

## 2020-02-26 DIAGNOSIS — F41.9 ANXIETY: ICD-10-CM

## 2020-02-26 DIAGNOSIS — Z72.0 TOBACCO ABUSE: ICD-10-CM

## 2020-02-26 DIAGNOSIS — F33.42 RECURRENT MAJOR DEPRESSIVE DISORDER, IN FULL REMISSION (HCC): ICD-10-CM

## 2020-02-26 DIAGNOSIS — E11.65 UNCONTROLLED TYPE 2 DIABETES MELLITUS WITH HYPERGLYCEMIA (HCC): ICD-10-CM

## 2020-02-26 DIAGNOSIS — I10 BENIGN ESSENTIAL HTN: ICD-10-CM

## 2020-02-26 DIAGNOSIS — R63.4 WEIGHT LOSS: ICD-10-CM

## 2020-02-26 DIAGNOSIS — D64.9 LOW HEMOGLOBIN: ICD-10-CM

## 2020-02-26 DIAGNOSIS — R53.81 PHYSICAL DECONDITIONING: ICD-10-CM

## 2020-02-26 DIAGNOSIS — Z09 HOSPITAL DISCHARGE FOLLOW-UP: ICD-10-CM

## 2020-02-26 DIAGNOSIS — E11.42 DIABETIC PERIPHERAL NEUROPATHY (HCC): ICD-10-CM

## 2020-02-26 LAB
C PEPTIDE SERPL-MCNC: 4.6 NG/ML (ref 1.1–4.4)
CREAT BLDA-MCNC: 1.5 MG/DL (ref 0.6–1.3)

## 2020-02-26 PROCEDURE — 82565 ASSAY OF CREATININE: CPT

## 2020-02-26 PROCEDURE — 71260 CT THORAX DX C+: CPT

## 2020-02-26 PROCEDURE — 25010000002 IOPAMIDOL 61 % SOLUTION: Performed by: PHYSICIAN ASSISTANT

## 2020-02-26 RX ADMIN — IOPAMIDOL 75 ML: 612 INJECTION, SOLUTION INTRAVENOUS at 14:31

## 2020-02-27 ENCOUNTER — TELEPHONE (OUTPATIENT)
Dept: FAMILY MEDICINE CLINIC | Facility: CLINIC | Age: 80
End: 2020-02-27

## 2020-02-27 NOTE — PROGRESS NOTES
Pt states the hospital told him he had to check with you regarding restarting metformin due to his Creatinine being 1.25 and his GFR being 44

## 2020-02-28 ENCOUNTER — OFFICE VISIT (OUTPATIENT)
Dept: ENDOCRINOLOGY | Age: 80
End: 2020-02-28

## 2020-02-28 VITALS
DIASTOLIC BLOOD PRESSURE: 70 MMHG | WEIGHT: 126 LBS | HEIGHT: 66 IN | SYSTOLIC BLOOD PRESSURE: 130 MMHG | BODY MASS INDEX: 20.25 KG/M2

## 2020-02-28 DIAGNOSIS — I10 BENIGN ESSENTIAL HTN: ICD-10-CM

## 2020-02-28 DIAGNOSIS — E55.9 VITAMIN D DEFICIENCY: ICD-10-CM

## 2020-02-28 DIAGNOSIS — E78.49 OTHER HYPERLIPIDEMIA: ICD-10-CM

## 2020-02-28 DIAGNOSIS — Z91.14 NONCOMPLIANCE WITH MEDICATIONS: ICD-10-CM

## 2020-02-28 DIAGNOSIS — R63.4 WEIGHT LOSS: ICD-10-CM

## 2020-02-28 DIAGNOSIS — R53.82 CHRONIC FATIGUE: ICD-10-CM

## 2020-02-28 DIAGNOSIS — E11.42 DIABETIC PERIPHERAL NEUROPATHY (HCC): ICD-10-CM

## 2020-02-28 DIAGNOSIS — E11.65 UNCONTROLLED TYPE 2 DIABETES MELLITUS WITH HYPERGLYCEMIA (HCC): Primary | ICD-10-CM

## 2020-02-28 PROCEDURE — 99214 OFFICE O/P EST MOD 30 MIN: CPT | Performed by: NURSE PRACTITIONER

## 2020-02-28 RX ORDER — INSULIN GLARGINE 100 [IU]/ML
10 INJECTION, SOLUTION SUBCUTANEOUS DAILY
Qty: 1 PEN | Refills: 3 | Status: SHIPPED | OUTPATIENT
Start: 2020-02-28 | End: 2020-03-29

## 2020-02-28 NOTE — PROGRESS NOTES
"Subjective   Mitchpari Medrano is a 79 y.o. male is here today for follow-up.  Chief Complaint   Patient presents with   • Diabetes     recent labs  bgx2 daily    • Hyperlipidemia   • Vitamin D Deficiency   • Fatigue     /70 (BP Location: Left arm, Patient Position: Sitting, Cuff Size: Adult)   Ht 167.6 cm (65.98\")   Wt 57.2 kg (126 lb)   BMI 20.35 kg/m²   Current Outpatient Medications on File Prior to Visit   Medication Sig   • Alcohol Swabs pads Swab finger before obtaining glucose level for diabetes. E11.42   • amLODIPine (NORVASC) 5 MG tablet Take 1 tablet by mouth Daily. For BP   • ASPIRIN LOW DOSE 81 MG EC tablet Take  by mouth Daily.   • Cholecalciferol (VITAMIN D3) 3000 units tablet Take 1,000 Units by mouth Daily.   • DROPLET PEN NEEDLES 31G X 8 MM misc    • escitalopram (LEXAPRO) 20 MG tablet Take 1 tablet by mouth Daily. For anxiety and depression   • glimepiride (AMARYL) 4 MG tablet Take 1 tablet by mouth Every Morning Before Breakfast. With food for DMII   • glucose blood (TRUE METRIX BLOOD GLUCOSE TEST) test strip Use to test BG 3 times daily. DX Code: E11.42   • hydroCHLOROthiazide (HYDRODIURIL) 25 MG tablet Take 1 tablet by mouth Daily. For BP   • LANTUS SOLOSTAR 100 UNIT/ML injection pen Inject 5 Units under the skin into the appropriate area as directed Daily. For DMII   • lisinopril (PRINIVIL,ZESTRIL) 40 MG tablet Take 1 tablet by mouth Daily. For BP and kidney protection   • Multiple Vitamins-Minerals (CENTRUM ADULTS PO) Take  by mouth.   • ondansetron ODT (ZOFRAN-ODT) 4 MG disintegrating tablet Take 1 tablet by mouth Every 6 (Six) Hours As Needed for Nausea.   • simvastatin (ZOCOR) 40 MG tablet Take 1 tablet by mouth Every Night. For cholesterol   • tamsulosin (FLOMAX) 0.4 MG capsule 24 hr capsule Take 1 capsule by mouth Daily. For urine flow (watch dizziness)   • metFORMIN (GLUCOPHAGE) 1000 MG tablet Take 1 tablet by mouth 2 (Two) Times a Day. For DMII     No current " facility-administered medications on file prior to visit.      No family history on file.  Social History     Tobacco Use   • Smoking status: Current Every Day Smoker     Types: Cigars   • Smokeless tobacco: Never Used   Substance Use Topics   • Alcohol use: No   • Drug use: No     Allergies   Allergen Reactions   • Flexeril [Cyclobenzaprine] Hives         History of Present Illness   Encounter Diagnoses   Name Primary?   • Benign essential HTN    • Uncontrolled type 2 diabetes mellitus with hyperglycemia (CMS/HCC) Yes   • Diabetic peripheral neuropathy (CMS/HCC)    • Other hyperlipidemia    • Noncompliance with medications    • Vitamin D deficiency    79-year-old male patient here today for a follow-up visit.  He is accompanied by his wife.  He has not been seen in this practice in over 1 year.  He has had several hospitalizations as a result of having kidney stones and stents placed.  He states 1 of the stents cause a massive infection which put him in the hospital for approximately 20 days.  He states he has been in and out of the hospital 3 times.  Since getting out of rehab he has home health currently.  He is working to improve his strength and endurance.  He has had loss of weight.  He is not eating very much.  He is drinking regular 7-Up's.  His hemoglobin A1c checked recently reflects uncontrolled type 2 diabetes.  He is currently taking Lantus 5 units once daily.  He did not bring his blood glucose meter to today's visit.  He states his blood sugar was 179 this morning.  He denies any hypoglycemic events.  He has chronic back pain which could also be contributing to his higher blood sugars.  He was recently tested for chest pain and was told that he had inflammation of his esophagus.  As result of having a CT scan he was put on hold for metformin for 48 hours.  He had a recent creatinine and GFR which shows his GFR is 44.  We will repeat his labs at today's visit to evaluate his renal function for the  continuation of metformin.  His wife is very hesitant to increase the Lantus for fear that he is going to have a low blood sugar.  We discussed target for his morning blood sugars of less than 110 mg/dL.  He is at risk for hypoglycemia on Lantus as well as on glimepiride.  He was noted to have abnormal thyroid T3.  He will have repeat thyroid labs at today's visit to further evaluate.  He has no history of hypothyroidism.  His blood pressures in satisfactory range        The following portions of the patient's history were reviewed and updated as appropriate: allergies, current medications, past family history, past medical history, past social history, past surgical history and problem list.    Review of Systems   Constitutional: Positive for fatigue. Negative for appetite change.   Eyes: Negative for visual disturbance.   Respiratory: Negative for cough.    Cardiovascular: Negative for leg swelling.   Gastrointestinal: Negative for constipation and diarrhea.   Endocrine: Positive for cold intolerance. Negative for heat intolerance, polydipsia, polyphagia and polyuria.   Genitourinary: Negative for frequency.   Neurological: Negative for numbness.       Objective   Physical Exam   Constitutional: He is oriented to person, place, and time. He appears well-developed and well-nourished. No distress.   HENT:   Head: Normocephalic and atraumatic.   Right Ear: External ear normal.   Left Ear: External ear normal.   Nose: Nose normal.   Eyes: Pupils are equal, round, and reactive to light. Right eye exhibits no discharge. Left eye exhibits no discharge.   Neck: Normal range of motion. Neck supple. Carotid bruit is not present. No tracheal deviation, no edema and no erythema present. No thyromegaly present.   Cardiovascular: Normal rate, regular rhythm, normal heart sounds and intact distal pulses. Exam reveals no gallop and no friction rub.   No murmur heard.  Pulmonary/Chest: Effort normal and breath sounds normal. No  respiratory distress. He has no wheezes. He has no rales.   Abdominal: Soft. Bowel sounds are normal. He exhibits no distension. There is no tenderness.   Musculoskeletal: He exhibits no edema or deformity.   Uses cane    Diabetic foot exam performed: exam performed feet normal with thickened toenails.   During the foot exam he had a monofilament test performed (no neuropathy).  Vascular Status -  His right foot exhibits normal foot vasculature  and no edema. His left foot exhibits normal foot vasculature  and no edema.  Skin Integrity  -  His right foot skin is intact.His left foot skin is intact..  Lymphadenopathy:     He has no cervical adenopathy.   Neurological: He is alert and oriented to person, place, and time. Coordination normal.   Skin: Skin is warm and dry. No rash noted. He is not diaphoretic. No erythema. No pallor.   Psychiatric: He has a normal mood and affect. His behavior is normal. Judgment and thought content normal.   Nursing note and vitals reviewed.    Lab Results   Component Value Date    GLUCOSE 130 (H) 02/24/2020    BUN 29 (H) 02/24/2020    CREATININE 1.50 (H) 02/26/2020    EGFRIFNONA 56 (L) 02/24/2020    EGFRIFAFRI 55 (L) 07/29/2019    BCR 23.2 02/24/2020    K 4.8 02/24/2020    CO2 25.2 02/24/2020    CALCIUM 9.9 02/24/2020    PROTENTOTREF 5.8 (L) 07/16/2019    ALBUMIN 3.90 02/24/2020    LABIL2 3.1 07/16/2019    AST 12 02/24/2020    ALT <5 02/24/2020     Lab Results   Component Value Date    HGBA1C 8.36 (H) 02/24/2020     Lab Results   Component Value Date    CHOL 128 02/24/2020    CHLPL 203 (H) 02/01/2019    TRIG 214 (H) 02/24/2020    HDL 43 02/24/2020    LDL 42 02/24/2020     Lab Results   Component Value Date    TSH 1.820 02/24/2020    D5WIXAZ 5.5 02/01/2019         Assessment/Plan   Problems Addressed this Visit        Cardiovascular and Mediastinum    Hyperlipidemia    Benign essential HTN       Digestive    Vitamin D deficiency       Endocrine    Diabetes type 2, uncontrolled  (CMS/[facility]) - Primary    Diabetic peripheral neuropathy (CMS/[facility])       Other    Noncompliance with medications        Patient was seen and examined.  He appears frail as a result of his frequent hospitalizations and therapy.  He is off metformin for 48 hours after having a CT scan.  He will have repeat renal function test at today's visit to determine whether or not he should continue to stay on metformin based on his last GFR of 44.  His blood pressures in satisfactory range.  His hemoglobin A1c reflects uncontrolled type 2 diabetes.  Have increased his Lantus to 10 units once daily.  They were educated today regarding mechanism of action along with peak onset and duration of Lantus insulin.  He is to monitor his blood sugars at least twice daily.  Goal for morning blood sugars is less than 110.  He has been advised to contact the office if his blood sugar readings fail to improve.  I have asked that they bring his blood glucose meter each visit.  He does have some heel pain in his left foot and will be referred to podiatry.  Currently he is wearing Dr. Schaeffer's gel cushion shoes.  A foot exam was performed at today's visit.  No neuropathy is noted.  He has no pre-ulcerative calluses.  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  Podiatry referral   Bring meter each visit  Increase lantus to 10 units once daily   Monitor blood sugars twice daily. Goal for morning bs's is less than 110 mg/dl  Contact office with readings if bs's fail to improve  Hypoglycemia  Hypoglycemia is when the sugar (glucose) level in your blood is too low. Signs of low blood sugar may include:  · Feeling:  ? Hungry.  ? Worried or nervous (anxious).  ? Sweaty and clammy.  ? Confused.  ? Dizzy.  ? Sleepy.  ? Sick to your stomach (nauseous).  · Having:  ? A fast heartbeat.  ? A headache.  ? A change in your vision.  ? Tingling or no feeling (numbness) around your mouth, lips, or tongue.  ? Jerky  movements that you cannot control (seizure).  · Having trouble with:  ? Moving (coordination).  ? Sleeping.  ? Passing out (fainting).  ? Getting upset easily (irritability).  Low blood sugar can happen to people who have diabetes and people who do not have diabetes. Low blood sugar can happen quickly, and it can be an emergency.  Treating low blood sugar  Low blood sugar is often treated by eating or drinking something sugary right away, such as:  · Fruit juice, 4-6 oz (120-150 mL).  · Regular soda (not diet soda), 4-6 oz (120-150 mL).  · Low-fat milk, 4 oz (120 mL).  · Several pieces of hard candy.  · Sugar or honey, 1 Tbsp (15 mL).  Treating low blood sugar if you have diabetes  If you can think clearly and swallow safely, follow the 15:15 rule:  · Take 15 grams of a fast-acting carb (carbohydrate). Talk with your doctor about how much you should take.  · Always keep a source of fast-acting carb with you, such as:  ? Sugar tablets (glucose pills). Take 3-4 pills.  ? 6-8 pieces of hard candy.  ? 4-6 oz (120-150 mL) of fruit juice.  ? 4-6 oz (120-150 mL) of regular (not diet) soda.  ? 1 Tbsp (15 mL) honey or sugar.  · Check your blood sugar 15 minutes after you take the carb.  · If your blood sugar is still at or below 70 mg/dL (3.9 mmol/L), take 15 grams of a carb again.  · If your blood sugar does not go above 70 mg/dL (3.9 mmol/L) after 3 tries, get help right away.  · After your blood sugar goes back to normal, eat a meal or a snack within 1 hour.    Treating very low blood sugar  If your blood sugar is at or below 54 mg/dL (3 mmol/L), you have very low blood sugar (severe hypoglycemia). This may also cause:  · Passing out.  · Jerky movements you cannot control (seizure).  · Losing consciousness (coma).  This is an emergency. Do not wait to see if the symptoms will go away. Get medical help right away. Call your local emergency services (911 in the [place] Do not drive yourself to the hospital.  If you have very  low blood sugar and you cannot eat or drink, you may need a glucagon shot (injection). A family member or friend should learn how to check your blood sugar and how to give you a glucagon shot. Ask your doctor if you need to have a glucagon shot kit at home.  Follow these instructions at home:  General instructions  · Take over-the-counter and prescription medicines only as told by your doctor.  · Stay aware of your blood sugar as told by your doctor.  · Limit alcohol intake to no more than 1 drink a day for nonpregnant women and 2 drinks a day for men. One drink equals 12 oz of beer (355 mL), 5 oz of wine (148 mL), or 1½ oz of hard liquor (44 mL).  · Keep all follow-up visits as told by your doctor. This is important.  If you have diabetes:    · Follow your diabetes care plan as told by your doctor. Make sure you:  ? Know the signs of low blood sugar.  ? Take your medicines as told.  ? Follow your exercise and meal plan.  ? Eat on time. Do not skip meals.  ? Check your blood sugar as often as told by your doctor. Always check it before and after exercise.  ? Follow your sick day plan when you cannot eat or drink normally. Make this plan ahead of time with your doctor.  · Share your diabetes care plan with:  ? Your work or school.  ? People you live with.  · Check your pee (urine) for ketones:  ? When you are sick.  ? As told by your doctor.  · Carry a card or wear jewelry that says you have diabetes.  Contact a doctor if:  · You have trouble keeping your blood sugar in your target range.  · You have low blood sugar often.  Get help right away if:  · You still have symptoms after you eat or drink something sugary.  · Your blood sugar is at or below 54 mg/dL (3 mmol/L).  · You have jerky movements that you cannot control.  · You pass out.  These symptoms may be an emergency. Do not wait to see if the symptoms will go away. Get medical help right away. Call your local emergency services (911 in the [place] Do not drive  yourself to the hospital.  Summary  · Hypoglycemia happens when the level of sugar (glucose) in your blood is too low.  · Low blood sugar can happen to people who have diabetes and people who do not have diabetes. Low blood sugar can happen quickly, and it can be an emergency.  · Make sure you know the signs of low blood sugar and know how to treat it.  · Always keep a source of sugar (fast-acting carb) with you to treat low blood sugar.  This information is not intended to replace advice given to you by your health care provider. Make sure you discuss any questions you have with your health care provider.  Document Released: 03/14/2011 Document Revised: 06/11/2019 Document Reviewed: 01/20/2017  [employer] Interactive Patient Education © 2020 [employer]

## 2020-02-28 NOTE — PATIENT INSTRUCTIONS
Podiatry referral   Bring meter each visit  Increase lantus to 10 units once daily   Monitor blood sugars twice daily. Goal for morning bs's is less than 110 mg/dl  Contact office with readings if bs's fail to improve  Labs pending   Hypoglycemia  Hypoglycemia is when the sugar (glucose) level in your blood is too low. Signs of low blood sugar may include:  · Feeling:  ? Hungry.  ? Worried or nervous (anxious).  ? Sweaty and clammy.  ? Confused.  ? Dizzy.  ? Sleepy.  ? Sick to your stomach (nauseous).  · Having:  ? A fast heartbeat.  ? A headache.  ? A change in your vision.  ? Tingling or no feeling (numbness) around your mouth, lips, or tongue.  ? Jerky movements that you cannot control (seizure).  · Having trouble with:  ? Moving (coordination).  ? Sleeping.  ? Passing out (fainting).  ? Getting upset easily (irritability).  Low blood sugar can happen to people who have diabetes and people who do not have diabetes. Low blood sugar can happen quickly, and it can be an emergency.  Treating low blood sugar  Low blood sugar is often treated by eating or drinking something sugary right away, such as:  · Fruit juice, 4-6 oz (120-150 mL).  · Regular soda (not diet soda), 4-6 oz (120-150 mL).  · Low-fat milk, 4 oz (120 mL).  · Several pieces of hard candy.  · Sugar or honey, 1 Tbsp (15 mL).  Treating low blood sugar if you have diabetes  If you can think clearly and swallow safely, follow the 15:15 rule:  · Take 15 grams of a fast-acting carb (carbohydrate). Talk with your doctor about how much you should take.  · Always keep a source of fast-acting carb with you, such as:  ? Sugar tablets (glucose pills). Take 3-4 pills.  ? 6-8 pieces of hard candy.  ? 4-6 oz (120-150 mL) of fruit juice.  ? 4-6 oz (120-150 mL) of regular (not diet) soda.  ? 1 Tbsp (15 mL) honey or sugar.  · Check your blood sugar 15 minutes after you take the carb.  · If your blood sugar is still at or below 70 mg/dL (3.9 mmol/L), take 15 grams of a  carb again.  · If your blood sugar does not go above 70 mg/dL (3.9 mmol/L) after 3 tries, get help right away.  · After your blood sugar goes back to normal, eat a meal or a snack within 1 hour.    Treating very low blood sugar  If your blood sugar is at or below 54 mg/dL (3 mmol/L), you have very low blood sugar (severe hypoglycemia). This may also cause:  · Passing out.  · Jerky movements you cannot control (seizure).  · Losing consciousness (coma).  This is an emergency. Do not wait to see if the symptoms will go away. Get medical help right away. Call your local emergency services (911 in the [place] Do not drive yourself to the hospital.  If you have very low blood sugar and you cannot eat or drink, you may need a glucagon shot (injection). A family member or friend should learn how to check your blood sugar and how to give you a glucagon shot. Ask your doctor if you need to have a glucagon shot kit at home.  Follow these instructions at home:  General instructions  · Take over-the-counter and prescription medicines only as told by your doctor.  · Stay aware of your blood sugar as told by your doctor.  · Limit alcohol intake to no more than 1 drink a day for nonpregnant women and 2 drinks a day for men. One drink equals 12 oz of beer (355 mL), 5 oz of wine (148 mL), or 1½ oz of hard liquor (44 mL).  · Keep all follow-up visits as told by your doctor. This is important.  If you have diabetes:    · Follow your diabetes care plan as told by your doctor. Make sure you:  ? Know the signs of low blood sugar.  ? Take your medicines as told.  ? Follow your exercise and meal plan.  ? Eat on time. Do not skip meals.  ? Check your blood sugar as often as told by your doctor. Always check it before and after exercise.  ? Follow your sick day plan when you cannot eat or drink normally. Make this plan ahead of time with your doctor.  · Share your diabetes care plan with:  ? Your work or school.  ? People you live  with.  · Check your pee (urine) for ketones:  ? When you are sick.  ? As told by your doctor.  · Carry a card or wear jewelry that says you have diabetes.  Contact a doctor if:  · You have trouble keeping your blood sugar in your target range.  · You have low blood sugar often.  Get help right away if:  · You still have symptoms after you eat or drink something sugary.  · Your blood sugar is at or below 54 mg/dL (3 mmol/L).  · You have jerky movements that you cannot control.  · You pass out.  These symptoms may be an emergency. Do not wait to see if the symptoms will go away. Get medical help right away. Call your local emergency services (911 in the [place] Do not drive yourself to the hospital.  Summary  · Hypoglycemia happens when the level of sugar (glucose) in your blood is too low.  · Low blood sugar can happen to people who have diabetes and people who do not have diabetes. Low blood sugar can happen quickly, and it can be an emergency.  · Make sure you know the signs of low blood sugar and know how to treat it.  · Always keep a source of sugar (fast-acting carb) with you to treat low blood sugar.  This information is not intended to replace advice given to you by your health care provider. Make sure you discuss any questions you have with your health care provider.  Document Released: 03/14/2011 Document Revised: 06/11/2019 Document Reviewed: 01/20/2017  [employer] Interactive Patient Education © 2020 [employer]

## 2020-03-02 LAB
ALBUMIN SERPL-MCNC: 3.9 G/DL (ref 3.5–5.2)
ALBUMIN/GLOB SERPL: 2.1 G/DL
ALP SERPL-CCNC: 80 U/L (ref 39–117)
ALT SERPL-CCNC: 6 U/L (ref 1–41)
AST SERPL-CCNC: 13 U/L (ref 1–40)
BILIRUB SERPL-MCNC: 0.3 MG/DL (ref 0.2–1.2)
BUN SERPL-MCNC: 30 MG/DL (ref 8–23)
BUN/CREAT SERPL: 24 (ref 7–25)
CALCIUM SERPL-MCNC: 9.8 MG/DL (ref 8.6–10.5)
CHLORIDE SERPL-SCNC: 99 MMOL/L (ref 98–107)
CO2 SERPL-SCNC: 26.5 MMOL/L (ref 22–29)
CREAT SERPL-MCNC: 1.25 MG/DL (ref 0.76–1.27)
FT4I SERPL CALC-MCNC: 2 (ref 1.2–4.9)
GLOBULIN SER CALC-MCNC: 1.9 GM/DL
GLUCOSE SERPL-MCNC: 204 MG/DL (ref 65–99)
POTASSIUM SERPL-SCNC: 4.9 MMOL/L (ref 3.5–5.2)
PROT SERPL-MCNC: 5.8 G/DL (ref 6–8.5)
SODIUM SERPL-SCNC: 137 MMOL/L (ref 136–145)
T3FREE SERPL-MCNC: 2 PG/ML (ref 2–4.4)
T3RU NFR SERPL: 29 % (ref 24–39)
T4 FREE SERPL-MCNC: 1.4 NG/DL (ref 0.93–1.7)
T4 SERPL-MCNC: 6.9 UG/DL (ref 4.5–12)
THYROGLOB AB SERPL-ACNC: <1 IU/ML (ref 0–0.9)
THYROPEROXIDASE AB SERPL-ACNC: <6 IU/ML (ref 0–34)
TSH SERPL DL<=0.005 MIU/L-ACNC: 2.06 UIU/ML (ref 0.45–4.5)

## 2020-03-13 ENCOUNTER — TELEPHONE (OUTPATIENT)
Dept: FAMILY MEDICINE CLINIC | Facility: CLINIC | Age: 80
End: 2020-03-13

## 2020-03-13 NOTE — TELEPHONE ENCOUNTER
Roberto with Home Health called and  wanted to let you know that she has Discharged pt due to all goals have been met.  If you have any questions or concerns she can be reached at [phone].  Thanks

## 2020-03-18 DIAGNOSIS — E78.49 OTHER HYPERLIPIDEMIA: ICD-10-CM

## 2020-03-18 DIAGNOSIS — E55.9 VITAMIN D DEFICIENCY: ICD-10-CM

## 2020-03-18 DIAGNOSIS — E11.42 DIABETIC PERIPHERAL NEUROPATHY (HCC): ICD-10-CM

## 2020-03-18 DIAGNOSIS — Z91.119 NONCOMPLIANCE OF PATIENT WITH DIETARY REGIMEN: ICD-10-CM

## 2020-03-18 DIAGNOSIS — I10 BENIGN ESSENTIAL HTN: ICD-10-CM

## 2020-03-18 DIAGNOSIS — IMO0001 UNCONTROLLED DIABETES MELLITUS TYPE 2 WITHOUT COMPLICATIONS: ICD-10-CM

## 2020-03-18 DIAGNOSIS — Z91.14 NONCOMPLIANCE WITH MEDICATIONS: ICD-10-CM

## 2020-03-18 DIAGNOSIS — N41.9 PROSTATITIS, UNSPECIFIED PROSTATITIS TYPE: ICD-10-CM

## 2020-03-19 RX ORDER — SIMVASTATIN 40 MG
40 TABLET ORAL NIGHTLY
Qty: 90 TABLET | Refills: 1 | Status: SHIPPED | OUTPATIENT
Start: 2020-03-19 | End: 2020-09-02

## 2020-03-19 RX ORDER — TAMSULOSIN HYDROCHLORIDE 0.4 MG/1
1 CAPSULE ORAL DAILY
Qty: 90 CAPSULE | Refills: 1 | Status: SHIPPED | OUTPATIENT
Start: 2020-03-19 | End: 2020-09-02 | Stop reason: SDUPTHER

## 2020-03-19 RX ORDER — ESCITALOPRAM OXALATE 20 MG/1
20 TABLET ORAL DAILY
Qty: 90 TABLET | Refills: 1 | Status: SHIPPED | OUTPATIENT
Start: 2020-03-19 | End: 2020-09-11 | Stop reason: HOSPADM

## 2020-03-19 RX ORDER — GLIMEPIRIDE 4 MG/1
4 TABLET ORAL
Qty: 90 TABLET | Refills: 1 | Status: SHIPPED | OUTPATIENT
Start: 2020-03-19 | End: 2020-09-02 | Stop reason: SDUPTHER

## 2020-03-19 RX ORDER — AMLODIPINE BESYLATE 5 MG/1
5 TABLET ORAL DAILY
Qty: 90 TABLET | Refills: 1 | Status: SHIPPED | OUTPATIENT
Start: 2020-03-19 | End: 2020-06-17

## 2020-03-19 RX ORDER — LISINOPRIL 40 MG/1
40 TABLET ORAL DAILY
Qty: 90 TABLET | Refills: 1 | Status: SHIPPED | OUTPATIENT
Start: 2020-03-19 | End: 2020-09-02 | Stop reason: SDUPTHER

## 2020-03-19 RX ORDER — ONDANSETRON 4 MG/1
4 TABLET, ORALLY DISINTEGRATING ORAL EVERY 6 HOURS PRN
Qty: 10 TABLET | Refills: 0 | Status: SHIPPED | OUTPATIENT
Start: 2020-03-19 | End: 2020-09-11 | Stop reason: HOSPADM

## 2020-03-19 RX ORDER — HYDROCHLOROTHIAZIDE 25 MG/1
25 TABLET ORAL DAILY
Qty: 90 TABLET | Refills: 1 | Status: SHIPPED | OUTPATIENT
Start: 2020-03-19 | End: 2020-09-02 | Stop reason: SDUPTHER

## 2020-03-23 ENCOUNTER — TELEPHONE (OUTPATIENT)
Dept: FAMILY MEDICINE CLINIC | Facility: CLINIC | Age: 80
End: 2020-03-23

## 2020-03-23 NOTE — TELEPHONE ENCOUNTER
----- Message from Shantell Alvarez PA-C sent at 3/23/2020  9:12 AM EDT -----  His white count is high I am hoping he is in the emergency room or in the hospital?

## 2020-03-24 ENCOUNTER — RESULTS ENCOUNTER (OUTPATIENT)
Dept: FAMILY MEDICINE CLINIC | Facility: CLINIC | Age: 80
End: 2020-03-24

## 2020-03-24 ENCOUNTER — OFFICE VISIT (OUTPATIENT)
Dept: FAMILY MEDICINE CLINIC | Facility: CLINIC | Age: 80
End: 2020-03-24

## 2020-03-24 VITALS
TEMPERATURE: 97.8 F | OXYGEN SATURATION: 98 % | HEIGHT: 66 IN | SYSTOLIC BLOOD PRESSURE: 120 MMHG | BODY MASS INDEX: 19.29 KG/M2 | WEIGHT: 120 LBS | RESPIRATION RATE: 16 BRPM | DIASTOLIC BLOOD PRESSURE: 62 MMHG | HEART RATE: 89 BPM

## 2020-03-24 DIAGNOSIS — N30.01 ACUTE CYSTITIS WITH HEMATURIA: Primary | ICD-10-CM

## 2020-03-24 DIAGNOSIS — N18.30 CKD (CHRONIC KIDNEY DISEASE) STAGE 3, GFR 30-59 ML/MIN (HCC): ICD-10-CM

## 2020-03-24 DIAGNOSIS — D63.8 ANEMIA OF CHRONIC DISEASE: ICD-10-CM

## 2020-03-24 DIAGNOSIS — D64.9 LOW HEMOGLOBIN: ICD-10-CM

## 2020-03-24 DIAGNOSIS — R91.1 PULMONARY NODULE: ICD-10-CM

## 2020-03-24 DIAGNOSIS — I10 BENIGN ESSENTIAL HTN: ICD-10-CM

## 2020-03-24 DIAGNOSIS — D72.829 LEUKOCYTOSIS, UNSPECIFIED TYPE: ICD-10-CM

## 2020-03-24 PROCEDURE — 99214 OFFICE O/P EST MOD 30 MIN: CPT | Performed by: PHYSICIAN ASSISTANT

## 2020-03-24 RX ORDER — CIPROFLOXACIN 500 MG/1
500 TABLET, FILM COATED ORAL EVERY 12 HOURS SCHEDULED
Qty: 20 TABLET | Refills: 0 | Status: SHIPPED | OUTPATIENT
Start: 2020-03-24 | End: 2020-05-23

## 2020-03-24 NOTE — PROGRESS NOTES
"Subjective   Courtney Medrano is a 79 y.o. male.     History of Present Illness   Courtney Medrano 79 y.o. male /62 (BP Location: Left arm, Patient Position: Sitting, Cuff Size: Adult)   Pulse 89   Temp 97.8 °F (36.6 °C) (Oral)   Resp 16   Ht 167.6 cm (65.98\")   Wt 54.4 kg (120 lb)   SpO2 98%   BMI 19.38 kg/m²  who presents today for new acute illness Sat.  Had black stools---pepto ---this can cause this.  No more diarrhea for day.  UTI symptoms with fever and sweats dysuria.  Just really started feeling bad and had a fever at 100.3.  [facility] did collect her permission from Dr. Vazquez who is on call urine to send for a urine micro and culture and did a CBC.  I had no knowledge of this and just received the results in my inbox and when I got the results I had the patient called.  I was very concerned that his white count was 18.4 I do want a note his hemoglobin was improved from when we checked it last month it was up to 11.2, neutrophils were high at 15.5 and his urine was abnormal I do have a copy of the urine culture and it does show Pseudomonas and Klebsiella and I will start him on Cipro.  I did personally discuss this patient in patient care today with Dr. Chester and agrees with the Cipro and to repeat a CBC in 3 days due to concern about leukocytosis.,  he has a history of   Patient Active Problem List   Diagnosis   • Anxiety disorder   • Recurrent major depressive disorder, in full remission (CMS/HCC)   • Constipation   • Diabetes type 2, uncontrolled (CMS/HCC)   • GERD (gastroesophageal reflux disease)   • Hyperlipidemia   • Low back pain   • Prostatitis   • Pulmonary nodule   • Diabetic peripheral neuropathy (CMS/HCC)   • Chronic fatigue   • Vitamin D deficiency   • Noncompliance of patient with dietary regimen   • Uncontrolled type 2 diabetes mellitus (CMS/HCC)   • Constipation   • Leg pain   • Benign essential HTN   • Noncompliance with medications   • Perirectal abscess   • Anxiety "   • CKD (chronic kidney disease) stage 3, GFR 30-59 ml/min (CMS/[facility])   • Anemia of chronic disease   • Low hemoglobin   • PAD (peripheral artery disease) (CMS/[facility])   • Polyp of colon   • Pyelonephritis   • Hematuria   • Tobacco use   • Type 2 diabetes mellitus with diabetic chronic kidney disease (CMS/[facility])   • Contusion of rib on right side   • Weight loss   .    Lab Results   Component Value Date    GLUCOSE 130 (H) 02/24/2020    CALCIUM 9.8 02/28/2020     02/28/2020    K 4.9 02/28/2020    CO2 26.5 02/28/2020    CL 99 02/28/2020    BUN 30 (H) 02/28/2020    CREATININE 1.25 02/28/2020    EGFRIFAFRI 68 02/28/2020    EGFRIFNONA 56 (L) 02/28/2020    BCR 24.0 02/28/2020    ANIONGAP 13.8 02/24/2020     Did discuss feeding tube d/t weight loss. He declines.  I gave him and spouse info on advanced directive and fill out .      No appetite  Fever until today;   Lab Results   Component Value Date    HGBA1C 8.36 (H) 02/24/2020     Sees endocrine for DMII  Was recently inpatient for sepsis from UTI    The following portions of the patient's history were reviewed and updated as appropriate: allergies, current medications, past family history, past medical history, past social history, past surgical history and problem list.    Review of Systems   Constitutional: Negative for activity change, appetite change and unexpected weight change.   HENT: Negative for nosebleeds and trouble swallowing.    Eyes: Negative for pain and visual disturbance.   Respiratory: Negative for chest tightness, shortness of breath and wheezing.    Cardiovascular: Negative for chest pain and palpitations.   Gastrointestinal: Negative for abdominal pain and blood in stool.   Endocrine: Negative.    Genitourinary: Negative for difficulty urinating and hematuria.   Musculoskeletal: Negative for joint swelling.   Skin: Negative for color change and rash.   Allergic/Immunologic: Negative.    Neurological: Negative for syncope and speech difficulty.    Hematological: Negative for adenopathy.   Psychiatric/Behavioral: Negative for agitation and confusion.   All other systems reviewed and are negative.      Objective   Physical Exam   Constitutional: He is oriented to person, place, and time. He appears well-developed.   Looks weak   HENT:   Head: Normocephalic and atraumatic.   Eyes: Pupils are equal, round, and reactive to light. Conjunctivae and EOM are normal. Right eye exhibits no discharge. Left eye exhibits no discharge. No scleral icterus.   Neck: Normal range of motion. Neck supple. No tracheal deviation present. No thyromegaly present.   Cardiovascular: Normal rate, regular rhythm, normal heart sounds, intact distal pulses and normal pulses. Exam reveals no gallop.   No murmur heard.  Pulmonary/Chest: Effort normal and breath sounds normal. No respiratory distress. He has no wheezes. He has no rales.   Abdominal: Soft. Bowel sounds are normal. There is no tenderness.   Musculoskeletal: Normal range of motion.   Lymphadenopathy:     He has no cervical adenopathy.   Neurological: He is alert and oriented to person, place, and time.   Skin: Skin is warm. No rash noted. No erythema. No pallor.   Psychiatric: He has a normal mood and affect. His behavior is normal. Judgment and thought content normal.   Nursing note and vitals reviewed.      Assessment/Plan   Courtney was seen today for urinary tract infection.    Diagnoses and all orders for this visit:    Acute cystitis with hematuria  -     Cancel: CBC & Differential  -     Comprehensive Metabolic Panel  -     CBC & Differential    Pulmonary nodule  -     CT Chest Without Contrast; Future    Leukocytosis, unspecified type  -     Comprehensive Metabolic Panel  -     CBC & Differential    Other orders  -     ciprofloxacin (Cipro) 500 MG tablet; Take 1 tablet by mouth Every 12 (Twelve) Hours. For infection        Cipro can make his glimepiride cause hypoglycemia and he will watch his blood sugars  Starting  Cipro today and sent a note to pharmacy  Estimated creatinine clearance was 37 and looked up dosing and can still do 500 mg  We will want a CBC to follow-up on leukocytosis in 3 days order made  No fever or chills today--ER if starts or UTI co worse

## 2020-03-24 NOTE — PATIENT INSTRUCTIONS
Urinary Tract Infection, Adult    A urinary tract infection (UTI) is an infection of any part of the urinary tract. The urinary tract includes the kidneys, ureters, bladder, and urethra. These organs make, store, and get rid of urine in the body.  Your health care provider may use other names to describe the infection. An upper UTI affects the ureters and kidneys (pyelonephritis). A lower UTI affects the bladder (cystitis) and urethra (urethritis).  What are the causes?  Most urinary tract infections are caused by bacteria in your genital area, around the entrance to your urinary tract (urethra). These bacteria grow and cause inflammation of your urinary tract.  What increases the risk?  You are more likely to develop this condition if:  · You have a urinary catheter that stays in place (indwelling).  · You are not able to control when you urinate or have a bowel movement (you have incontinence).  · You are female and you:  ? Use a spermicide or diaphragm for birth control.  ? Have low estrogen levels.  ? Are pregnant.  · You have certain genes that increase your risk (genetics).  · You are sexually active.  · You take antibiotic medicines.  · You have a condition that causes your flow of urine to slow down, such as:  ? An enlarged prostate, if you are male.  ? Blockage in your urethra (stricture).  ? A kidney stone.  ? A nerve condition that affects your bladder control (neurogenic bladder).  ? Not getting enough to drink, or not urinating often.  · You have certain medical conditions, such as:  ? Diabetes.  ? A weak disease-fighting system (immunesystem).  ? Sickle cell disease.  ? Gout.  ? Spinal cord injury.  What are the signs or symptoms?  Symptoms of this condition include:  · Needing to urinate right away (urgently).  · Frequent urination or passing small amounts of urine frequently.  · Pain or burning with urination.  · Blood in the urine.  · Urine that smells bad or unusual.  · Trouble urinating.  · Cloudy  urine.  · Vaginal discharge, if you are female.  · Pain in the abdomen or the lower back.  You may also have:  · Vomiting or a decreased appetite.  · Confusion.  · Irritability or tiredness.  · A fever.  · Diarrhea.  The first symptom in older adults may be confusion. In some cases, they may not have any symptoms until the infection has worsened.  How is this diagnosed?  This condition is diagnosed based on your medical history and a physical exam. You may also have other tests, including:  · Urine tests.  · Blood tests.  · Tests for sexually transmitted infections (STIs).  If you have had more than one UTI, a cystoscopy or imaging studies may be done to determine the cause of the infections.  How is this treated?  Treatment for this condition includes:  · Antibiotic medicine.  · Over-the-counter medicines to treat discomfort.  · Drinking enough water to stay hydrated.  If you have frequent infections or have other conditions such as a kidney stone, you may need to see a health care provider who specializes in the urinary tract (urologist).  In rare cases, urinary tract infections can cause sepsis. Sepsis is a life-threatening condition that occurs when the body responds to an infection. Sepsis is treated in the hospital with IV antibiotics, fluids, and other medicines.  Follow these instructions at home:    Medicines  · Take over-the-counter and prescription medicines only as told by your health care provider.  · If you were prescribed an antibiotic medicine, take it as told by your health care provider. Do not stop using the antibiotic even if you start to feel better.  General instructions  · Make sure you:  ? Empty your bladder often and completely. Do not hold urine for long periods of time.  ? Empty your bladder after sex.  ? Wipe from front to back after a bowel movement if you are female. Use each tissue one time when you wipe.  · Drink enough fluid to keep your urine pale yellow.  · Keep all follow-up  visits as told by your health care provider. This is important.  Contact a health care provider if:  · Your symptoms do not get better after 1-2 days.  · Your symptoms go away and then return.  Get help right away if you have:  · Severe pain in your back or your lower abdomen.  · A fever.  · Nausea or vomiting.  Summary  · A urinary tract infection (UTI) is an infection of any part of the urinary tract, which includes the kidneys, ureters, bladder, and urethra.  · Most urinary tract infections are caused by bacteria in your genital area, around the entrance to your urinary tract (urethra).  · Treatment for this condition often includes antibiotic medicines.  · If you were prescribed an antibiotic medicine, take it as told by your health care provider. Do not stop using the antibiotic even if you start to feel better.  · Keep all follow-up visits as told by your health care provider. This is important.  This information is not intended to replace advice given to you by your health care provider. Make sure you discuss any questions you have with your health care provider.  Document Released: 09/27/2006 Document Revised: 12/05/2019 Document Reviewed: 06/27/2019  [employer] Interactive Patient Education © 2020 [employer]

## 2020-03-26 ENCOUNTER — TELEPHONE (OUTPATIENT)
Dept: FAMILY MEDICINE CLINIC | Facility: CLINIC | Age: 80
End: 2020-03-26

## 2020-04-15 VITALS — HEIGHT: 67 IN | WEIGHT: 125 LBS

## 2020-04-16 ENCOUNTER — OFFICE (OUTPATIENT)
Dept: URBAN - METROPOLITAN AREA CLINIC 75 | Facility: CLINIC | Age: 80
End: 2020-04-16
Payer: MEDICARE

## 2020-04-16 DIAGNOSIS — D12.2 BENIGN NEOPLASM OF ASCENDING COLON: ICD-10-CM

## 2020-04-16 DIAGNOSIS — Z86.010 PERSONAL HISTORY OF COLONIC POLYPS: ICD-10-CM

## 2020-04-16 DIAGNOSIS — K44.9 DIAPHRAGMATIC HERNIA WITHOUT OBSTRUCTION OR GANGRENE: ICD-10-CM

## 2020-04-16 DIAGNOSIS — K21.9 GASTRO-ESOPHAGEAL REFLUX DISEASE WITHOUT ESOPHAGITIS: ICD-10-CM

## 2020-04-16 DIAGNOSIS — D12.3 BENIGN NEOPLASM OF TRANSVERSE COLON: ICD-10-CM

## 2020-04-16 DIAGNOSIS — K22.2 ESOPHAGEAL OBSTRUCTION: ICD-10-CM

## 2020-04-16 DIAGNOSIS — D50.9 IRON DEFICIENCY ANEMIA, UNSPECIFIED: ICD-10-CM

## 2020-04-16 DIAGNOSIS — K31.89 OTHER DISEASES OF STOMACH AND DUODENUM: ICD-10-CM

## 2020-04-16 DIAGNOSIS — D12.0 BENIGN NEOPLASM OF CECUM: ICD-10-CM

## 2020-04-16 DIAGNOSIS — K29.70 GASTRITIS, UNSPECIFIED, WITHOUT BLEEDING: ICD-10-CM

## 2020-04-16 DIAGNOSIS — D12.5 BENIGN NEOPLASM OF SIGMOID COLON: ICD-10-CM

## 2020-04-16 PROCEDURE — 99213 OFFICE O/P EST LOW 20 MIN: CPT | Mod: 95 | Performed by: INTERNAL MEDICINE

## 2020-04-16 NOTE — SERVICENOTES
CT scan reviewed.  He had evidence of ureteral stents and small kidney stones.  CT scan also showed "colitis" but the patient had a colonoscopy the week before the CT.  Colonoscopy was negative for colitis.

## 2020-04-16 NOTE — SERVICEHPINOTES
Due to the Corona virus pandemic the patient was seen via telemedicine.Mister Costello is a 80-year-old gentleman who has a history of anemia and weight loss. I did an upper and lower endoscopy on him in December. He did have evidence of a benign stricture and some inflammation at the GE junction. I put him on pantoprazole but he tells me today he's just using baking soda as needed. He says he uses it 3 or 4 times a week. He has to use a depending on what he eats or if he overeats. There is no dysphagia, odynophagia, nausea or vomiting. There is no melena or hematemesis. He does not drink. He does smoke a couple of cigars a day.From a lower GI standpoint his bowels move every 2-3 days but sometimes he says all have a little bit of urgency and diarrhea but that's not often. He is not on any fiber supplements and I think he would benefit from taking Benefiber or a water soluble fiber and he should increase his dietary fiber. There is no blood in the bowels.At the time was colonoscopy he had multiple adenomatous polyps so he needs close follow-up. The plan is to do a follow-up colonoscopy in December of this year.His only other complaint is "leg cramps". Otherwise there is no change in his past medical or past surgical history. It sounds like he has peripheral vascular disease. He says he spoke to have "a procedure on my legs". He also had blood work about a month ago, I will get a copy of those results.

## 2020-05-23 RX ORDER — CIPROFLOXACIN 500 MG/1
TABLET, FILM COATED ORAL
Qty: 20 TABLET | Refills: 0 | Status: SHIPPED | OUTPATIENT
Start: 2020-05-23 | End: 2020-06-17

## 2020-06-11 ENCOUNTER — OFFICE VISIT (OUTPATIENT)
Dept: FAMILY MEDICINE CLINIC | Facility: CLINIC | Age: 80
End: 2020-06-11

## 2020-06-11 VITALS
WEIGHT: 109 LBS | BODY MASS INDEX: 17.52 KG/M2 | HEART RATE: 82 BPM | DIASTOLIC BLOOD PRESSURE: 56 MMHG | HEIGHT: 66 IN | RESPIRATION RATE: 16 BRPM | TEMPERATURE: 98.6 F | SYSTOLIC BLOOD PRESSURE: 124 MMHG | OXYGEN SATURATION: 97 %

## 2020-06-11 DIAGNOSIS — R53.1 WEAKNESS: ICD-10-CM

## 2020-06-11 DIAGNOSIS — R11.2 NON-INTRACTABLE VOMITING WITH NAUSEA, UNSPECIFIED VOMITING TYPE: ICD-10-CM
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
## 2020-06-11 DIAGNOSIS — R27.0 ATAXIA: ICD-10-CM

## 2020-06-11 DIAGNOSIS — N39.0 RECURRENT UTI: ICD-10-CM

## 2020-06-11 DIAGNOSIS — R63.0 ANOREXIA: ICD-10-CM

## 2020-06-11 DIAGNOSIS — I10 BENIGN ESSENTIAL HTN: ICD-10-CM

## 2020-06-11 PROCEDURE — 99214 OFFICE O/P EST MOD 30 MIN: CPT | Performed by: FAMILY MEDICINE

## 2020-06-11 RX ORDER — MEGESTROL ACETATE 40 MG/ML
400 SUSPENSION ORAL DAILY
Qty: 300 ML | Refills: 0 | Status: SHIPPED | OUTPATIENT
Start: 2020-06-11 | End: 2020-09-11 | Stop reason: HOSPADM

## 2020-06-11 RX ORDER — PANTOPRAZOLE SODIUM 40 MG/1
TABLET, DELAYED RELEASE ORAL
COMMUNITY
Start: 2020-06-08 | End: 2020-09-02 | Stop reason: SDUPTHER

## 2020-06-11 NOTE — PROGRESS NOTES
"Subjective   Mitchpari Medrano is a 80 y.o. male.     CC: Management of HTN/UTI/Falls    History of Present Illness     Pt of Shantell Alvarez' comes to see me today in f/u for management of HTN, along with f/u to UTIs and Falls. Pt is receiving HH for balance/gait improvement and needs a HH order resubmitted today so as to continue. Pt was treated most recently with Abx for UTI back in March. Pt with multi-month h/o decreased appetite and some weight loss with this.       The following portions of the patient's history were reviewed and updated as appropriate: allergies, current medications, past family history, past medical history, past social history, past surgical history and problem list.    Review of Systems   Constitutional: Negative for activity change, chills, fatigue and fever.   Respiratory: Negative for cough and shortness of breath.    Cardiovascular: Negative for chest pain and palpitations.   Gastrointestinal: Positive for nausea and vomiting. Negative for abdominal pain.   Endocrine: Negative for cold intolerance.   Neurological: Positive for weakness.        Balance issues   Psychiatric/Behavioral: Negative for behavioral problems and dysphoric mood. The patient is not nervous/anxious.        /56   Pulse 82   Temp 98.6 °F (37 °C) (Oral)   Resp 16   Ht 167.6 cm (65.98\")   Wt 49.4 kg (109 lb)   SpO2 97%   BMI 17.60 kg/m²     Objective   Physical Exam   Constitutional: He appears well-developed. He appears cachectic.   Neck: Neck supple. No thyromegaly present.   Cardiovascular: Normal rate and regular rhythm.   No murmur heard.  Pulmonary/Chest: Effort normal and breath sounds normal.   Abdominal: Bowel sounds are normal. There is no tenderness.   Psychiatric: He has a normal mood and affect. His behavior is normal.   Nursing note and vitals reviewed.      Assessment/Plan   Courtney was seen today for home health orders, hypertension and urinary tract infection.    Diagnoses and all orders for " this visit:    Fall, initial encounter  -     Ambulatory Referral to Home Health    Benign essential HTN  -     Comprehensive Metabolic Panel  -     CBC & Differential  -     TSH    Ataxia  -     Ambulatory Referral to Home Health    Weakness  -     Ambulatory Referral to Home Health    Recurrent UTI  -     Urinalysis With Culture If Indicated - Urine, Clean Catch    Anorexia  -     megestrol (Megace Oral) 40 MG/ML suspension; Take 10 mL by mouth Daily.    Non-intractable vomiting with nausea, unspecified vomiting type  -     Ambulatory Referral to Gastroenterology

## 2020-06-12 LAB
ALBUMIN SERPL-MCNC: 4.2 G/DL (ref 3.5–5.2)
ALBUMIN/GLOB SERPL: 2.2 G/DL
ALP SERPL-CCNC: 53 U/L (ref 39–117)
ALT SERPL-CCNC: 15 U/L (ref 1–41)
APPEARANCE UR: CLEAR
AST SERPL-CCNC: 15 U/L (ref 1–40)
BACTERIA #/AREA URNS HPF: ABNORMAL /HPF
BASOPHILS # BLD AUTO: 0.04 10*3/MM3 (ref 0–0.2)
BASOPHILS NFR BLD AUTO: 0.5 % (ref 0–1.5)
BILIRUB SERPL-MCNC: 0.2 MG/DL (ref 0.2–1.2)
BILIRUB UR QL STRIP: NEGATIVE
BUN SERPL-MCNC: 40 MG/DL (ref 8–23)
BUN/CREAT SERPL: 23.7 (ref 7–25)
CALCIUM SERPL-MCNC: 10 MG/DL (ref 8.6–10.5)
CASTS URNS MICRO: ABNORMAL
CASTS URNS QL MICRO: PRESENT /LPF
CHLORIDE SERPL-SCNC: 98 MMOL/L (ref 98–107)
CO2 SERPL-SCNC: 23.3 MMOL/L (ref 22–29)
COLOR UR: YELLOW
CREAT SERPL-MCNC: 1.69 MG/DL (ref 0.76–1.27)
EOSINOPHIL # BLD AUTO: 0.45 10*3/MM3 (ref 0–0.4)
EOSINOPHIL NFR BLD AUTO: 5.8 % (ref 0.3–6.2)
EPI CELLS #/AREA URNS HPF: ABNORMAL /HPF (ref 0–10)
ERYTHROCYTE [DISTWIDTH] IN BLOOD BY AUTOMATED COUNT: 15 % (ref 12.3–15.4)
GLOBULIN SER CALC-MCNC: 1.9 GM/DL
GLUCOSE SERPL-MCNC: 132 MG/DL (ref 65–99)
GLUCOSE UR QL: NEGATIVE
HCT VFR BLD AUTO: 33.3 % (ref 37.5–51)
HGB BLD-MCNC: 11.1 G/DL (ref 13–17.7)
HGB UR QL STRIP: NEGATIVE
IMM GRANULOCYTES # BLD AUTO: 0.02 10*3/MM3 (ref 0–0.05)
IMM GRANULOCYTES NFR BLD AUTO: 0.3 % (ref 0–0.5)
KETONES UR QL STRIP: NEGATIVE
LEUKOCYTE ESTERASE UR QL STRIP: NEGATIVE
LYMPHOCYTES # BLD AUTO: 2.77 10*3/MM3 (ref 0.7–3.1)
LYMPHOCYTES NFR BLD AUTO: 35.7 % (ref 19.6–45.3)
MCH RBC QN AUTO: 31.2 PG (ref 26.6–33)
MCHC RBC AUTO-ENTMCNC: 33.3 G/DL (ref 31.5–35.7)
MCV RBC AUTO: 93.5 FL (ref 79–97)
MICRO URNS: NORMAL
MICRO URNS: NORMAL
MONOCYTES # BLD AUTO: 0.47 10*3/MM3 (ref 0.1–0.9)
MONOCYTES NFR BLD AUTO: 6.1 % (ref 5–12)
MUCOUS THREADS URNS QL MICRO: PRESENT /HPF
NEUTROPHILS # BLD AUTO: 4 10*3/MM3 (ref 1.7–7)
NEUTROPHILS NFR BLD AUTO: 51.6 % (ref 42.7–76)
NITRITE UR QL STRIP: NEGATIVE
NRBC BLD AUTO-RTO: 0 /100 WBC (ref 0–0.2)
PH UR STRIP: 5.5 [PH] (ref 5–7.5)
PLATELET # BLD AUTO: 199 10*3/MM3 (ref 140–450)
POTASSIUM SERPL-SCNC: 4.8 MMOL/L (ref 3.5–5.2)
PROT SERPL-MCNC: 6.1 G/DL (ref 6–8.5)
PROT UR QL STRIP: NEGATIVE
RBC # BLD AUTO: 3.56 10*6/MM3 (ref 4.14–5.8)
RBC #/AREA URNS HPF: ABNORMAL /HPF (ref 0–2)
SODIUM SERPL-SCNC: 138 MMOL/L (ref 136–145)
SP GR UR: 1.02 (ref 1–1.03)
TSH SERPL DL<=0.005 MIU/L-ACNC: 2.17 UIU/ML (ref 0.27–4.2)
URINALYSIS REFLEX: NORMAL
UROBILINOGEN UR STRIP-MCNC: 0.2 MG/DL (ref 0.2–1)
WBC # BLD AUTO: 7.75 10*3/MM3 (ref 3.4–10.8)
WBC #/AREA URNS HPF: ABNORMAL /HPF (ref 0–5)

## 2020-06-15 DIAGNOSIS — D64.9 ANEMIA, UNSPECIFIED TYPE: Primary | ICD-10-CM

## 2020-06-17 ENCOUNTER — OFFICE VISIT (OUTPATIENT)
Dept: FAMILY MEDICINE CLINIC | Facility: CLINIC | Age: 80
End: 2020-06-17

## 2020-06-17 VITALS
WEIGHT: 109 LBS | RESPIRATION RATE: 16 BRPM | HEART RATE: 76 BPM | TEMPERATURE: 99 F | OXYGEN SATURATION: 98 % | SYSTOLIC BLOOD PRESSURE: 122 MMHG | HEIGHT: 66 IN | BODY MASS INDEX: 17.52 KG/M2 | DIASTOLIC BLOOD PRESSURE: 58 MMHG

## 2020-06-17 DIAGNOSIS — F33.1 MODERATE EPISODE OF RECURRENT MAJOR DEPRESSIVE DISORDER (HCC): ICD-10-CM

## 2020-06-17 DIAGNOSIS — N18.30 CKD (CHRONIC KIDNEY DISEASE) STAGE 3, GFR 30-59 ML/MIN (HCC): ICD-10-CM

## 2020-06-17 DIAGNOSIS — D63.8 ANEMIA OF CHRONIC DISEASE: ICD-10-CM

## 2020-06-17 DIAGNOSIS — Z72.0 TOBACCO USE: ICD-10-CM

## 2020-06-17 DIAGNOSIS — K63.5 POLYP OF COLON, UNSPECIFIED PART OF COLON, UNSPECIFIED TYPE: ICD-10-CM

## 2020-06-17 DIAGNOSIS — I73.9 PAD (PERIPHERAL ARTERY DISEASE) (HCC): ICD-10-CM

## 2020-06-17 DIAGNOSIS — R63.4 WEIGHT LOSS: ICD-10-CM

## 2020-06-17 DIAGNOSIS — I10 BENIGN ESSENTIAL HTN: Primary | ICD-10-CM

## 2020-06-17 PROCEDURE — 99214 OFFICE O/P EST MOD 30 MIN: CPT | Performed by: PHYSICIAN ASSISTANT

## 2020-06-17 PROCEDURE — G0439 PPPS, SUBSEQ VISIT: HCPCS | Performed by: PHYSICIAN ASSISTANT

## 2020-06-17 PROCEDURE — 96160 PT-FOCUSED HLTH RISK ASSMT: CPT | Performed by: PHYSICIAN ASSISTANT

## 2020-06-17 RX ORDER — ARIPIPRAZOLE 2 MG/1
2 TABLET ORAL NIGHTLY
Qty: 30 TABLET | Refills: 1 | Status: SHIPPED | OUTPATIENT
Start: 2020-06-17 | End: 2020-09-02 | Stop reason: SDUPTHER

## 2020-06-17 RX ORDER — APIXABAN 5 MG/1
TABLET, FILM COATED ORAL
COMMUNITY
Start: 2020-06-13 | End: 2020-09-02

## 2020-06-17 NOTE — PROGRESS NOTES
"Subjective   Courtney Medrano is a 80 y.o. male.     History of Present Illness   Courtney Medrano 80 y.o. male /58 (BP Location: Right arm, Patient Position: Sitting, Cuff Size: Adult)   Pulse 76   Temp 99 °F (37.2 °C) (Oral)   Resp 16   Ht 167.6 cm (65.98\")   Wt 49.4 kg (109 lb)   SpO2 98%   BMI 17.60 kg/m²  who presents today for weakness and weight loss.     he has a history of   Patient Active Problem List   Diagnosis   • Anxiety disorder   • Recurrent major depressive disorder, in full remission (CMS/HCC)   • Constipation   • Diabetes type 2, uncontrolled (CMS/HCC)   • GERD (gastroesophageal reflux disease)   • Hyperlipidemia   • Low back pain   • Prostatitis   • Pulmonary nodule   • Diabetic peripheral neuropathy (CMS/HCC)   • Chronic fatigue   • Vitamin D deficiency   • Noncompliance of patient with dietary regimen   • Uncontrolled type 2 diabetes mellitus (CMS/HCC)   • Constipation   • Leg pain   • Benign essential HTN   • Noncompliance with medications   • Perirectal abscess   • Anxiety   • CKD (chronic kidney disease) stage 3, GFR 30-59 ml/min (CMS/HCC)   • Anemia of chronic disease   • Low hemoglobin   • PAD (peripheral artery disease) (CMS/HCC)   • Polyp of colon   • Pyelonephritis   • Hematuria   • Tobacco use   • Type 2 diabetes mellitus with diabetic chronic kidney disease (CMS/HCC)   • Contusion of rib on right side   • Weight loss   .    Sees Dr DEEP Fan for CKD 3  He is just not eating,. No appetite; weight down.  I am concerned about his weight loss and do want a note that I reviewed colonoscopy and EGD from Dr. Vignesh Babin 12/27/2019--- had polyps noted in the colon.  CT of the chest with contrast was February and did show some noncalcified nodules and follow-up CT recommended in 6 months and this is been ordered.  I am concerned about his weight loss and he is not been eating and this is been going on several months and he gets weak and he can fall is very unsteady.  I am concerned " "about him.  Also concerned about injury with falling and would like for him to consider a skilled care unit for safety.  He is not interested in a feeding tube or any life prolonging measures such as that.  His blood pressure is low today at got 90/60 and in good to stop his amlodipine.   He is seeing Dr Fermin for vascular disease. Found DVT left   He just saw Dr. Chester a few days ago and did order labs and shows anemia of chronic disease and progression of chronic kidney disease with his creatinine 1.69 and did stop his metformin.  I did send an email to Asia Hilton his endocrinologist and she is aware that he has stopped metformin.  His urine was negative and TSH was in range.  Still smoking  Has DMII    Mitchpari Medrano male 80 y.o., /58 (BP Location: Right arm, Patient Position: Sitting, Cuff Size: Adult)   Pulse 76   Temp 99 °F (37.2 °C) (Oral)   Resp 16   Ht 167.6 cm (65.98\")   Wt 49.4 kg (109 lb)   SpO2 98%   BMI 17.60 kg/m²   who presents today for follow up of Depression and Anxiety.  He reports having breakthrough depression and is taking the Lexapro and it is helped the anxiety and he just does not have any activity he enjoys he lays around he sleeps he does not want to eat. . Onset of symptoms was approximately several months ago.  He denies current suicidal and homicidal ideation. Risk factors are chronic illness and chronic pain.  Previous treatment includes current Rx.  He complains of the following medication side effects: none.  The patient declines to go to counseling..  Discussed with Dr. Chester and will add Abilify at bedtime to treat breakthrough depression and also see if it helps his appetite    The following portions of the patient's history were reviewed and updated as appropriate: allergies, current medications, past family history, past medical history, past social history, past surgical history and problem list.    Review of Systems   Constitutional: Positive for " activity change, appetite change and fatigue. Negative for unexpected weight change.   HENT: Negative for nosebleeds and trouble swallowing.    Eyes: Negative for pain and visual disturbance.   Respiratory: Negative for chest tightness, shortness of breath and wheezing.    Cardiovascular: Negative for chest pain and palpitations.   Gastrointestinal: Negative for abdominal pain and blood in stool.   Endocrine: Negative.    Genitourinary: Negative for difficulty urinating and hematuria.   Musculoskeletal: Negative for joint swelling.   Skin: Negative for color change and rash.   Allergic/Immunologic: Negative.    Neurological: Positive for dizziness, weakness and light-headedness. Negative for syncope and speech difficulty.   Hematological: Negative for adenopathy.   Psychiatric/Behavioral: Positive for dysphoric mood. Negative for agitation, confusion and sleep disturbance.   All other systems reviewed and are negative.      Objective   Physical Exam   Constitutional: He is oriented to person, place, and time. He appears well-developed and well-nourished. No distress.   cachectic.    HENT:   Head: Normocephalic and atraumatic.   Eyes: Pupils are equal, round, and reactive to light. Conjunctivae and EOM are normal. Right eye exhibits no discharge. Left eye exhibits no discharge. No scleral icterus.   Neck: Normal range of motion. Neck supple. No tracheal deviation present. No thyromegaly present.   Cardiovascular: Normal rate, regular rhythm, normal heart sounds and normal pulses. Exam reveals no gallop.   No murmur heard.  Decreased pedal pulses; cool left lower leg; trace edema left   Pulmonary/Chest: Effort normal and breath sounds normal. No respiratory distress. He has no wheezes. He has no rales.   Abdominal: Soft.   Musculoskeletal: Normal range of motion.   Neurological: He is alert and oriented to person, place, and time. He exhibits normal muscle tone. Coordination normal.   Skin: Skin is warm. No rash  noted. He is not diaphoretic. No erythema. No pallor.   Psychiatric: He has a normal mood and affect. His behavior is normal. Judgment and thought content normal.   Nursing note and vitals reviewed.      Assessment/Plan   Courtney was seen today for anemia and medicare wellness-subsequent.    Diagnoses and all orders for this visit:    Benign essential HTN    Tobacco use    Weight loss    CKD (chronic kidney disease) stage 3, GFR 30-59 ml/min (CMS/[facility])    Anemia of chronic disease    Polyp of colon, unspecified part of colon, unspecified type    PAD (peripheral artery disease) (CMS/[facility])        I did ask him about hospital admission and he declines at this time because he is very weak today  We will stop amlodipine due to low blood pressure of 90/60  May need to cut doses of his other blood pressure medicines.  Home health was reordered by Dr. Chester and will also have them monitor his blood pressure  Try supplements  Declines feeding tube type measures  Will have him start the Megace to see if it stimulates his appetite and reassess 2 weeks  Discussed with Dr. Chester will add Abilify to the Lexapro for the breakthrough depression and see if it also helps his appetite

## 2020-06-17 NOTE — PROGRESS NOTES
The ABCs of the Annual Wellness Visit  Subsequent Medicare Wellness Visit    Chief Complaint   Patient presents with   • Anemia       Subjective   History of Present Illness:  Courtney Medrano is a 80 y.o. male who presents for a Subsequent Medicare Wellness Visit.    HEALTH RISK ASSESSMENT    Recent Hospitalizations:  Recently treated at the following:  [facility]    Current Medical Providers:  Patient Care Team:  Shantell Alvarez PA-C as PCP - General (Family Medicine)  Asia Hilton APRN as Nurse Practitioner (Endocrinology)  Aureliano Linares MD as Consulting Physician (Urology)  Osmin Fan MD as Consulting Physician (Nephrology)  Vignesh Babin MD as Consulting Physician (Gastroenterology)    Smoking Status:  Social History     Tobacco Use   Smoking Status Current Every Day Smoker   • Types: Cigars   Smokeless Tobacco Never Used       Alcohol Consumption:  Social History     Substance and Sexual Activity   Alcohol Use No       Depression Screen:   PHQ-2/PHQ-9 Depression Screening 6/17/2020   Little interest or pleasure in doing things 0   Feeling down, depressed, or hopeless 0   Total Score 0       Fall Risk Screen:  STEADI Fall Risk Assessment was completed, and patient is at HIGH risk for falls. Assessment completed on:6/17/2020    Health Habits and Functional and Cognitive Screening:  No flowsheet data found.      Does the patient have evidence of cognitive impairment? No    Asprin use counseling:Contraindicated from taking ASA    Age-appropriate Screening Schedule:  Refer to the list below for future screening recommendations based on patient's age, sex and/or medical conditions. Orders for these recommended tests are listed in the plan section. The patient has been provided with a written plan.    Health Maintenance   Topic Date Due   • DIABETIC EYE EXAM  08/02/2018   • URINE MICROALBUMIN  03/07/2020   • ZOSTER VACCINE (1 of 2) 08/06/2020 (Originally [birth year]2/1992)   • HEMOGLOBIN A1C   08/24/2020   • LIPID PANEL  02/24/2021   • COLONOSCOPY  12/27/2022   • INFLUENZA VACCINE  Discontinued   • TDAP/TD VACCINES  Discontinued          The following portions of the patient's history were reviewed and updated as appropriate: allergies, current medications, past family history, past medical history, past social history, past surgical history and problem list.    Outpatient Medications Prior to Visit   Medication Sig Dispense Refill   • Alcohol Swabs pads Swab finger before obtaining glucose level for diabetes. E11.42 300 each 1   • amLODIPine (NORVASC) 5 MG tablet Take 1 tablet by mouth Daily. For BP 90 tablet 1   • ASPIRIN LOW DOSE 81 MG EC tablet Take  by mouth Daily.     • Cholecalciferol (VITAMIN D3) 3000 units tablet Take 1,000 Units by mouth Daily.     • DROPLET PEN NEEDLES 31G X 8 MM misc      • escitalopram (LEXAPRO) 20 MG tablet Take 1 tablet by mouth Daily. For anxiety and depression 90 tablet 1   • glimepiride (AMARYL) 4 MG tablet Take 1 tablet by mouth Every Morning Before Breakfast. With food for DMII 90 tablet 1   • glucose blood (TRUE METRIX BLOOD GLUCOSE TEST) test strip Use to test BG 3 times daily. DX Code: E11.42 300 each 1   • hydroCHLOROthiazide (HYDRODIURIL) 25 MG tablet Take 1 tablet by mouth Daily. For BP 90 tablet 1   • lisinopril (PRINIVIL,ZESTRIL) 40 MG tablet Take 1 tablet by mouth Daily. For BP and kidney protection 90 tablet 1   • megestrol (Megace Oral) 40 MG/ML suspension Take 10 mL by mouth Daily. 300 mL 0   • Multiple Vitamins-Minerals (CENTRUM ADULTS PO) Take  by mouth.     • ondansetron ODT (ZOFRAN-ODT) 4 MG disintegrating tablet Place 1 tablet on the tongue Every 6 (Six) Hours As Needed for Nausea. 10 tablet 0   • pantoprazole (PROTONIX) 40 MG EC tablet      • simvastatin (ZOCOR) 40 MG tablet Take 1 tablet by mouth Every Night. For cholesterol 90 tablet 1   • tamsulosin (FLOMAX) 0.4 MG capsule 24 hr capsule Take 1 capsule by mouth Daily. For urine flow (watch  dizziness) 90 capsule 1   • ELIQUIS 5 MG tablet tablet TK 2 TS PO BID FOR 7 DAYS. THEN TK 1 T PO BID FOR 30 DAYS     • ciprofloxacin (CIPRO) 500 MG tablet TAKE 1 TABLET BY MOUTH EVERY 12 HOURS FOR INFECTION 20 tablet 0   • ciprofloxacin (CIPRO) 500 MG tablet TAKE 1 TABLET BY MOUTH EVERY 12 HOURS FOR INFECTION 20 tablet 0     No facility-administered medications prior to visit.        Patient Active Problem List   Diagnosis   • Anxiety disorder   • Recurrent major depressive disorder, in full remission (CMS/[facility])   • Constipation   • Diabetes type 2, uncontrolled (CMS/HCC)   • GERD (gastroesophageal reflux disease)   • Hyperlipidemia   • Low back pain   • Prostatitis   • Pulmonary nodule   • Diabetic peripheral neuropathy (CMS/HCC)   • Chronic fatigue   • Vitamin D deficiency   • Noncompliance of patient with dietary regimen   • Uncontrolled type 2 diabetes mellitus (CMS/HCC)   • Constipation   • Leg pain   • Benign essential HTN   • Noncompliance with medications   • Perirectal abscess   • Anxiety   • CKD (chronic kidney disease) stage 3, GFR 30-59 ml/min (CMS/HCC)   • Anemia of chronic disease   • Low hemoglobin   • PAD (peripheral artery disease) (CMS/[facility])   • Polyp of colon   • Pyelonephritis   • Hematuria   • Tobacco use   • Type 2 diabetes mellitus with diabetic chronic kidney disease (CMS/HCC)   • Contusion of rib on right side   • Weight loss       Advanced Care Planning:  ACP discussion was held with the patient during this visit. Patient does not have an advance directive, declines further assistance.    Review of Systems    Compared to one year ago, the patient feels his physical health is worse.  Compared to one year ago, the patient feels his mental health is worse.    Reviewed chart for potential of high risk medication in the elderly: yes  Reviewed chart for potential of harmful drug interactions in the elderly:yes    Objective         Vitals:    06/17/20 1614   Resp: 16   Temp: 99 °F (37.2 °C)  "  TempSrc: Oral   Weight: 49.4 kg (109 lb)   Height: 167.6 cm (65.98\")       Body mass index is 17.6 kg/m².  Discussed the patient's BMI with him. The BMI is below average; BMI management plan is completed.    Physical Exam    Lab Results   Component Value Date     (H) 06/11/2020        Assessment/Plan   Medicare Risks and Personalized Health Plan  CMS Preventative Services Quick Reference  Cardiovascular risk  Fall Risk  Inactivity/Sedentary  Lung Cancer Risk  Tobacco Use/Dependance (use dotphrase .tobaccocessation for documentation)    The above risks/problems have been discussed with the patient.  Pertinent information has been shared with the patient in the After Visit Summary.  Follow up plans and orders are seen below in the Assessment/Plan Section.    There are no diagnoses linked to this encounter.  Follow Up:  No follow-ups on file.     An After Visit Summary and PPPS were given to the patient.             "

## 2020-06-17 NOTE — PATIENT INSTRUCTIONS
Medicare Wellness  Personal Prevention Plan of Service     Date of Office Visit:  2020  Encounter Provider:  Shantell Alvarez PA-C  Place of Service:  [facility]  Patient Name: Courtney Medrano  :  1940    As part of the Medicare Wellness portion of your visit today, we are providing you with this personalized preventive plan of services (PPPS). This plan is based upon recommendations of the [place] Preventive Services Task Force (USPSTF) and the Advisory Committee on Immunization Practices (ACIP).    This lists the preventive care services that should be considered, and provides dates of when you are due. Items listed as completed are up-to-date and do not require any further intervention.    Health Maintenance   Topic Date Due   • Pneumococcal Vaccine Once at 65 Years Old  2007   • HEPATITIS C SCREENING  2016   • DIABETIC EYE EXAM  2018   • URINE MICROALBUMIN  2020   • ZOSTER VACCINE (1 of 2) 2020 (Originally 1992)   • HEMOGLOBIN A1C  2020   • LIPID PANEL  2021   • MEDICARE ANNUAL WELLNESS  2021   • COLONOSCOPY  2022   • INFLUENZA VACCINE  Discontinued   • TDAP/TD VACCINES  Discontinued       No orders of the defined types were placed in this encounter.      Return in about 2 weeks (around 2020), or if symptoms worsen or fail to improve.          Fall Prevention in the Home, Adult  Falls can cause injuries and can affect people from all age groups. There are many simple things that you can do to make your home safe and to help prevent falls. Ask for help when making these changes, if needed.  What actions can I take to prevent falls?  General instructions  · Use good lighting in all rooms. Replace any light bulbs that burn out.  · Turn on lights if it is dark. Use night-lights.  · Place frequently used items in easy-to-reach places. Lower the shelves around your home if necessary.  · Set up furniture  so that there are clear paths around it. Avoid moving your furniture around.  · Remove throw rugs and other tripping hazards from the floor.  · Avoid walking on wet floors.  · Fix any uneven floor surfaces.  · Add color or contrast paint or tape to grab bars and handrails in your home. Place contrasting color strips on the first and last steps of stairways.  · When you use a stepladder, make sure that it is completely opened and that the sides are firmly locked. Have someone hold the ladder while you are using it. Do not climb a closed stepladder.  · Be aware of any and all pets.  What can I do in the bathroom?         · Keep the floor dry. Immediately clean up any water that spills onto the floor.  · Remove soap buildup in the tub or shower on a regular basis.  · Use non-skid mats or decals on the floor of the tub or shower.  · Attach bath mats securely with double-sided, non-slip rug tape.  · If you need to sit down while you are in the shower, use a plastic, non-slip stool.  · Install grab bars by the toilet and in the tub and shower. Do not use towel bars as grab bars.  What can I do in the bedroom?  · Make sure that a bedside light is easy to reach.  · Do not use oversized bedding that drapes onto the floor.  · Have a firm chair that has side arms to use for getting dressed.  What can I do in the kitchen?  · Clean up any spills right away.  · If you need to reach for something above you, use a sturdy step stool that has a grab bar.  · Keep electrical cables out of the way.  · Do not use floor polish or wax that makes floors slippery. If you must use wax, make sure that it is non-skid floor wax.  What can I do in the stairways?  · Do not leave any items on the stairs.  · Make sure that you have a light switch at the top of the stairs and the bottom of the stairs. Have them installed if you do not have them.  · Make sure that there are handrails on both sides of the stairs. Fix handrails that are broken or loose.  Make sure that handrails are as long as the stairways.  · Install non-slip stair treads on all stairs in your home.  · Avoid having throw rugs at the top or bottom of stairways, or secure the rugs with carpet tape to prevent them from moving.  · Choose a carpet design that does not hide the edge of steps on the stairway.  · Check any carpeting to make sure that it is firmly attached to the stairs. Fix any carpet that is loose or worn.  What can I do on the outside of my home?  · Use bright outdoor lighting.  · Regularly repair the edges of walkways and driveways and fix any cracks.  · Remove high doorway thresholds.  · Trim any shrubbery on the main path into your home.  · Regularly check that handrails are securely fastened and in good repair. Both sides of any steps should have handrails.  · Install guardrails along the edges of any raised decks or porches.  · Clear walkways of debris and clutter, including tools and rocks.  · Have leaves, snow, and ice cleared regularly.  · Use sand or salt on walkways during winter months.  · In the garage, clean up any spills right away, including grease or oil spills.  What other actions can I take?  · Wear closed-toe shoes that fit well and support your feet. Wear shoes that have rubber soles or low heels.  · Use mobility aids as needed, such as canes, walkers, scooters, and crutches.  · Review your medicines with your health care provider. Some medicines can cause dizziness or changes in blood pressure, which increase your risk of falling.  Talk with your health care provider about other ways that you can decrease your risk of falls. This may include working with a physical therapist or  to improve your strength, balance, and endurance.  Where to find more information  · Centers for Disease Control and Prevention, MADIADI: https://www.cdc.gov  · National [place] on Aging: https://xr1mbjd.mark.nih.gov  Contact a health care provider if:  · You are afraid of falling at  home.  · You feel weak, drowsy, or dizzy at home.  · You fall at home.  Summary  · There are many simple things that you can do to make your home safe and to help prevent falls.  · Ways to make your home safe include removing tripping hazards and installing grab bars in the bathroom.  · Ask for help when making these changes in your home.  This information is not intended to replace advice given to you by your health care provider. Make sure you discuss any questions you have with your health care provider.  Document Released: 12/08/2003 Document Revised: 11/30/2018 Document Reviewed: 08/02/2018  Elsevier Patient Education © 2020 [employer] Inc[employer]

## 2020-06-23 ENCOUNTER — TELEPHONE (OUTPATIENT)
Dept: FAMILY MEDICINE CLINIC | Facility: CLINIC | Age: 80
End: 2020-06-23

## 2020-06-23 NOTE — TELEPHONE ENCOUNTER
Milagros Winters, PT with [facility] called to report that pt fell - son found on floor yesterday at 7:15 a.m. Pt's family is not aware how long pt had been on the floor as pt's wife was asleep. Pt was lying on back, but there are no known injuries.

## 2020-06-24 ENCOUNTER — TELEPHONE (OUTPATIENT)
Dept: FAMILY MEDICINE CLINIC | Facility: CLINIC | Age: 80
End: 2020-06-24

## 2020-06-24 NOTE — TELEPHONE ENCOUNTER
Milagros Winters, PT with [facility] calling to report that pt has had no BM x 1 week since stopping Metformin.

## 2020-06-25 NOTE — TELEPHONE ENCOUNTER
Called Milagros Winters - no answer - left detailed Vm and call back number for further questions.

## 2020-06-30 ENCOUNTER — TELEPHONE (OUTPATIENT)
Dept: ENDOCRINOLOGY | Age: 80
End: 2020-06-30

## 2020-06-30 ENCOUNTER — TRANSCRIBE ORDERS (OUTPATIENT)
Dept: ADMINISTRATIVE | Facility: HOSPITAL | Age: 80
End: 2020-06-30

## 2020-06-30 DIAGNOSIS — R91.1 PULMONARY NODULE: Primary | ICD-10-CM

## 2020-07-01 ENCOUNTER — TELEPHONE (OUTPATIENT)
Dept: ENDOCRINOLOGY | Age: 80
End: 2020-07-01

## 2020-07-01 NOTE — TELEPHONE ENCOUNTER
S\W Avis she just wanted to verify if Asia knew that Mr. Medrano was off of metformin. I reviewed his current meds list and saw that it was not there. DB

## 2020-07-17 ENCOUNTER — TELEPHONE (OUTPATIENT)
Dept: FAMILY MEDICINE CLINIC | Facility: CLINIC | Age: 80
End: 2020-07-17

## 2020-07-17 NOTE — TELEPHONE ENCOUNTER
----- Message from Shantell Alvarez PA-C sent at 7/14/2020  5:48 PM EDT -----  F/u appt--hosp f/u  ----- Message -----  From: Naina Chester RegSched Rep  Sent: 7/14/2020  11:14 AM EDT  To: Shantell Alvarez PA-C

## 2020-08-20 ENCOUNTER — TELEPHONE (OUTPATIENT)
Dept: FAMILY MEDICINE CLINIC | Facility: CLINIC | Age: 80
End: 2020-08-20

## 2020-08-20 NOTE — TELEPHONE ENCOUNTER
Pt was admitted to Hospice and seen by NP yesterday.  Pt is requesting PT to build strength - Hospice does not order.  Wendy is asking if you would be willing to order PT with [facility].

## 2020-08-21 NOTE — TELEPHONE ENCOUNTER
Spoke with Nicole at [facility] Int[facility] - Nicole will check to see if they can resume care and let us know.

## 2020-08-25 ENCOUNTER — TELEPHONE (OUTPATIENT)
Dept: FAMILY MEDICINE CLINIC | Facility: CLINIC | Age: 80
End: 2020-08-25

## 2020-08-25 NOTE — TELEPHONE ENCOUNTER
Ok Pt to eval order x14 days will give V.O. To Alis [phone] @ [facility]. Also to remind pt he needs an appt in 14 days.

## 2020-08-26 ENCOUNTER — TELEPHONE (OUTPATIENT)
Dept: FAMILY MEDICINE CLINIC | Facility: CLINIC | Age: 80
End: 2020-08-26

## 2020-08-26 NOTE — TELEPHONE ENCOUNTER
Milagros Winters called to report that pt is stronger than last time and has gained weight, which is good.    Pt's glucose yesterday was 245 (fasting) a.m. and 400 (not fasting) p.m.    Milagros Winters states that pt has not used insulin since 08/11/20 because pt states that he didn't feel that he needed it.  Pt is still falling frequently.    Milagros Winters is requesting orders for nursing and .

## 2020-08-28 ENCOUNTER — TELEPHONE (OUTPATIENT)
Dept: FAMILY MEDICINE CLINIC | Facility: CLINIC | Age: 80
End: 2020-08-28

## 2020-08-28 NOTE — TELEPHONE ENCOUNTER
Milagros Winters with [facility] called to report that pt fell 08/26/20 and landed on butt - no apparent injuries.

## 2020-09-01 ENCOUNTER — TELEPHONE (OUTPATIENT)
Dept: FAMILY MEDICINE CLINIC | Facility: CLINIC | Age: 80
End: 2020-09-01

## 2020-09-01 NOTE — TELEPHONE ENCOUNTER
Milagros Winters called to report that pt fell 08/30/20.  Pt hit bottom and landed on left side.  Pt c/o left hip/pelvic pain. Milagros Winters states that pain was better before she left pt's house, but advised pt to let her know if pain worsened.

## 2020-09-01 NOTE — TELEPHONE ENCOUNTER
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 Is he still seeing vascular surgeon??? Will need f/u on this

## 2020-09-02 ENCOUNTER — TELEPHONE (OUTPATIENT)
Dept: FAMILY MEDICINE CLINIC | Facility: CLINIC | Age: 80
End: 2020-09-02

## 2020-09-02 DIAGNOSIS — E55.9 VITAMIN D DEFICIENCY: ICD-10-CM

## 2020-09-02 DIAGNOSIS — N18.30 STAGE 3 CHRONIC KIDNEY DISEASE (HCC): ICD-10-CM

## 2020-09-02 DIAGNOSIS — I10 BENIGN ESSENTIAL HTN: ICD-10-CM

## 2020-09-02 DIAGNOSIS — N18.3 TYPE 2 DIABETES MELLITUS WITH STAGE 3 CHRONIC KIDNEY DISEASE, UNSPECIFIED WHETHER LONG TERM INSULIN USE: ICD-10-CM

## 2020-09-02 DIAGNOSIS — N41.9 PROSTATITIS, UNSPECIFIED PROSTATITIS TYPE: ICD-10-CM

## 2020-09-02 DIAGNOSIS — Z91.14 NONCOMPLIANCE WITH MEDICATIONS: ICD-10-CM

## 2020-09-02 DIAGNOSIS — Z91.119 NONCOMPLIANCE OF PATIENT WITH DIETARY REGIMEN: ICD-10-CM

## 2020-09-02 DIAGNOSIS — E11.22 TYPE 2 DIABETES MELLITUS WITH STAGE 3 CHRONIC KIDNEY DISEASE, UNSPECIFIED WHETHER LONG TERM INSULIN USE: ICD-10-CM

## 2020-09-02 DIAGNOSIS — E78.49 OTHER HYPERLIPIDEMIA: ICD-10-CM

## 2020-09-02 DIAGNOSIS — E11.42 DIABETIC PERIPHERAL NEUROPATHY (HCC): ICD-10-CM

## 2020-09-02 RX ORDER — HYDROCHLOROTHIAZIDE 25 MG/1
25 TABLET ORAL DAILY
Qty: 30 TABLET | Refills: 1 | Status: SHIPPED | OUTPATIENT
Start: 2020-09-02 | End: 2020-09-11 | Stop reason: HOSPADM

## 2020-09-02 RX ORDER — OMEPRAZOLE 20 MG/1
20 CAPSULE, DELAYED RELEASE ORAL DAILY
Qty: 30 CAPSULE | Refills: 1 | Status: SHIPPED | OUTPATIENT
Start: 2020-09-02 | End: 2020-09-11 | Stop reason: HOSPADM

## 2020-09-02 RX ORDER — METFORMIN HYDROCHLORIDE EXTENDED-RELEASE TABLETS 1000 MG/1
1000 TABLET, FILM COATED, EXTENDED RELEASE ORAL 3 TIMES DAILY
Qty: 90 TABLET | Refills: 1 | Status: SHIPPED | OUTPATIENT
Start: 2020-09-02 | End: 2020-09-11 | Stop reason: HOSPADM

## 2020-09-02 RX ORDER — GLIMEPIRIDE 4 MG/1
4 TABLET ORAL
Qty: 30 TABLET | Refills: 1 | Status: SHIPPED | OUTPATIENT
Start: 2020-09-02 | End: 2020-09-11 | Stop reason: HOSPADM

## 2020-09-02 RX ORDER — TAMSULOSIN HYDROCHLORIDE 0.4 MG/1
1 CAPSULE ORAL DAILY
Qty: 30 CAPSULE | Refills: 1 | Status: SHIPPED | OUTPATIENT
Start: 2020-09-02 | End: 2020-11-25 | Stop reason: SDDI

## 2020-09-02 RX ORDER — LISINOPRIL 40 MG/1
40 TABLET ORAL DAILY
Qty: 30 TABLET | Refills: 1 | Status: SHIPPED | OUTPATIENT
Start: 2020-09-02 | End: 2020-09-11 | Stop reason: HOSPADM

## 2020-09-02 RX ORDER — AMLODIPINE BESYLATE 5 MG/1
5 TABLET ORAL DAILY
Qty: 30 TABLET | Refills: 1 | Status: SHIPPED | OUTPATIENT
Start: 2020-09-02 | End: 2020-09-11 | Stop reason: HOSPADM

## 2020-09-02 RX ORDER — DEXAMETHASONE 2 MG/1
2 TABLET ORAL DAILY
Qty: 30 TABLET | Refills: 1 | Status: SHIPPED | OUTPATIENT
Start: 2020-09-02 | End: 2020-09-11 | Stop reason: HOSPADM

## 2020-09-02 RX ORDER — ARIPIPRAZOLE 2 MG/1
2 TABLET ORAL NIGHTLY
Qty: 30 TABLET | Refills: 1 | Status: SHIPPED | OUTPATIENT
Start: 2020-09-02 | End: 2020-09-15

## 2020-09-02 RX ORDER — SODIUM BICARBONATE 650 MG/1
650 TABLET ORAL 3 TIMES DAILY
Qty: 90 TABLET | Refills: 1 | Status: SHIPPED | OUTPATIENT
Start: 2020-09-02

## 2020-09-02 RX ORDER — PANTOPRAZOLE SODIUM 40 MG/1
40 TABLET, DELAYED RELEASE ORAL DAILY
Qty: 30 TABLET | Refills: 1 | Status: SHIPPED | OUTPATIENT
Start: 2020-09-02

## 2020-09-02 RX ORDER — HYDRALAZINE HYDROCHLORIDE 50 MG/1
50 TABLET, FILM COATED ORAL 3 TIMES DAILY
Qty: 90 TABLET | Refills: 1 | Status: SHIPPED | OUTPATIENT
Start: 2020-09-02

## 2020-09-02 NOTE — TELEPHONE ENCOUNTER
Pharmacy requesting Rx's for:    Lisinopril 40 mg  Pantoprazole 40 mg  Metformin 1000 mg  Sodium bicarbonate 650 mg  omeprazole 20 mg  tamulosin .04 mg  Lantus Solostar 1 box  Eliquis 2.5 mg  amlodipine 5 mg  glimiperide 4 mg  Aripiprazole 2 mg  Hydralazine 50 mg  Dexamethasone 2 mg  HCTZ 25 mg      Shantell Alvarez signed written order faxed by pharmacy - in chart.

## 2020-09-03 ENCOUNTER — TELEPHONE (OUTPATIENT)
Dept: FAMILY MEDICINE CLINIC | Facility: CLINIC | Age: 80
End: 2020-09-03

## 2020-09-04 ENCOUNTER — TELEPHONE (OUTPATIENT)
Dept: FAMILY MEDICINE CLINIC | Facility: CLINIC | Age: 80
End: 2020-09-04

## 2020-09-04 NOTE — TELEPHONE ENCOUNTER
Nicole can you please advise as Shantell is out of the office?  Pharmacist is packaging all of pt's meds today.  Thanks!

## 2020-09-04 NOTE — TELEPHONE ENCOUNTER
Oralia, pharmacist with [facility] called stating that Rx for Metformin ER 1000 mg TID is above maximum daily dose - please advise.

## 2020-09-04 NOTE — TELEPHONE ENCOUNTER
I talked to Oralia again and had him cancel last Rx for Metformin ER.  Oralia will contact endo to see if they want to prescribe alternative.

## 2020-09-04 NOTE — TELEPHONE ENCOUNTER
Spoke to Oralia, Pharmacist - he is asking if you are managing Metformin or if endo is. Also, he is questioning the Metformin extended release as it is above the recommended daily dose, and pt has renal failure.  Please advise.

## 2020-09-04 NOTE — TELEPHONE ENCOUNTER
BS   08/30/20 430  9/1/20 272 AM   392 PM  9/2/20 147 Am   375 PM  9/3/20 130 AM   369 PM  9/4/20 239 AM      The number in the AM was fasting. The others are just random.     They are wanting to know if anything needs to be changed

## 2020-09-05 ENCOUNTER — APPOINTMENT (OUTPATIENT)
Dept: CT IMAGING | Facility: HOSPITAL | Age: 80
End: 2020-09-05

## 2020-09-05 ENCOUNTER — HOSPITAL ENCOUNTER (INPATIENT)
Facility: HOSPITAL | Age: 80
LOS: 6 days | Discharge: HOME HEALTH | End: 2020-09-11
Attending: EMERGENCY MEDICINE | Admitting: HOSPITALIST

## 2020-09-05 DIAGNOSIS — E87.5 HYPERKALEMIA: ICD-10-CM

## 2020-09-05 DIAGNOSIS — R53.1 GENERALIZED WEAKNESS: Primary | ICD-10-CM

## 2020-09-05 DIAGNOSIS — N28.9 ACUTE RENAL INSUFFICIENCY: ICD-10-CM

## 2020-09-05 PROBLEM — N17.9 AKI (ACUTE KIDNEY INJURY) (HCC): Status: ACTIVE | Noted: 2020-09-05

## 2020-09-05 LAB
ALBUMIN SERPL-MCNC: 4.3 G/DL (ref 3.5–5.2)
ALBUMIN/GLOB SERPL: 2.2 G/DL
ALP SERPL-CCNC: 63 U/L (ref 39–117)
ALT SERPL W P-5'-P-CCNC: 7 U/L (ref 1–41)
ANION GAP SERPL CALCULATED.3IONS-SCNC: 15.3 MMOL/L (ref 5–15)
APTT PPP: 28.1 SECONDS (ref 22.7–35.4)
AST SERPL-CCNC: 12 U/L (ref 1–40)
BACTERIA UR QL AUTO: NORMAL /HPF
BASOPHILS # BLD AUTO: 0.02 10*3/MM3 (ref 0–0.2)
BASOPHILS NFR BLD AUTO: 0.2 % (ref 0–1.5)
BILIRUB SERPL-MCNC: 0.2 MG/DL (ref 0–1.2)
BILIRUB UR QL STRIP: NEGATIVE
BUN SERPL-MCNC: 45 MG/DL (ref 8–23)
BUN/CREAT SERPL: 16.7 (ref 7–25)
CALCIUM SPEC-SCNC: 9.5 MG/DL (ref 8.6–10.5)
CHLORIDE SERPL-SCNC: 99 MMOL/L (ref 98–107)
CLARITY UR: CLEAR
CO2 SERPL-SCNC: 16.7 MMOL/L (ref 22–29)
COLOR UR: YELLOW
CREAT SERPL-MCNC: 2.7 MG/DL (ref 0.76–1.27)
DEPRECATED RDW RBC AUTO: 45.5 FL (ref 37–54)
EOSINOPHIL # BLD AUTO: 0.02 10*3/MM3 (ref 0–0.4)
EOSINOPHIL NFR BLD AUTO: 0.2 % (ref 0.3–6.2)
ERYTHROCYTE [DISTWIDTH] IN BLOOD BY AUTOMATED COUNT: 12.9 % (ref 12.3–15.4)
GFR SERPL CREATININE-BSD FRML MDRD: 23 ML/MIN/1.73
GLOBULIN UR ELPH-MCNC: 2 GM/DL
GLUCOSE SERPL-MCNC: 235 MG/DL (ref 65–99)
GLUCOSE UR STRIP-MCNC: ABNORMAL MG/DL
HCT VFR BLD AUTO: 33 % (ref 37.5–51)
HGB BLD-MCNC: 10.6 G/DL (ref 13–17.7)
HGB UR QL STRIP.AUTO: NEGATIVE
HYALINE CASTS UR QL AUTO: NORMAL /LPF
IMM GRANULOCYTES # BLD AUTO: 0.07 10*3/MM3 (ref 0–0.05)
IMM GRANULOCYTES NFR BLD AUTO: 0.8 % (ref 0–0.5)
INR PPP: 1.09 (ref 0.9–1.1)
KETONES UR QL STRIP: NEGATIVE
LEUKOCYTE ESTERASE UR QL STRIP.AUTO: NEGATIVE
LYMPHOCYTES # BLD AUTO: 2.52 10*3/MM3 (ref 0.7–3.1)
LYMPHOCYTES NFR BLD AUTO: 30.5 % (ref 19.6–45.3)
MCH RBC QN AUTO: 31.3 PG (ref 26.6–33)
MCHC RBC AUTO-ENTMCNC: 32.1 G/DL (ref 31.5–35.7)
MCV RBC AUTO: 97.3 FL (ref 79–97)
MONOCYTES # BLD AUTO: 0.49 10*3/MM3 (ref 0.1–0.9)
MONOCYTES NFR BLD AUTO: 5.9 % (ref 5–12)
NEUTROPHILS NFR BLD AUTO: 5.13 10*3/MM3 (ref 1.7–7)
NEUTROPHILS NFR BLD AUTO: 62.4 % (ref 42.7–76)
NITRITE UR QL STRIP: NEGATIVE
NRBC BLD AUTO-RTO: 0 /100 WBC (ref 0–0.2)
PH UR STRIP.AUTO: >=9 [PH] (ref 5–8)
PLATELET # BLD AUTO: 264 10*3/MM3 (ref 140–450)
PMV BLD AUTO: 9.6 FL (ref 6–12)
POTASSIUM SERPL-SCNC: 6.4 MMOL/L (ref 3.5–5.2)
PROT SERPL-MCNC: 6.3 G/DL (ref 6–8.5)
PROT UR QL STRIP: ABNORMAL
PROTHROMBIN TIME: 13.9 SECONDS (ref 11.7–14.2)
RBC # BLD AUTO: 3.39 10*6/MM3 (ref 4.14–5.8)
RBC # UR: NORMAL /HPF
REF LAB TEST METHOD: NORMAL
SODIUM SERPL-SCNC: 131 MMOL/L (ref 136–145)
SP GR UR STRIP: 1.02 (ref 1–1.03)
SQUAMOUS #/AREA URNS HPF: NORMAL /HPF
TROPONIN T SERPL-MCNC: 0.01 NG/ML (ref 0–0.03)
UROBILINOGEN UR QL STRIP: ABNORMAL
WBC # BLD AUTO: 8.25 10*3/MM3 (ref 3.4–10.8)
WBC UR QL AUTO: NORMAL /HPF

## 2020-09-05 PROCEDURE — 85025 COMPLETE CBC W/AUTO DIFF WBC: CPT | Performed by: EMERGENCY MEDICINE

## 2020-09-05 PROCEDURE — U0004 COV-19 TEST NON-CDC HGH THRU: HCPCS | Performed by: EMERGENCY MEDICINE

## 2020-09-05 PROCEDURE — 80053 COMPREHEN METABOLIC PANEL: CPT | Performed by: EMERGENCY MEDICINE

## 2020-09-05 PROCEDURE — 63710000001 INSULIN REGULAR HUMAN PER 5 UNITS: Performed by: EMERGENCY MEDICINE

## 2020-09-05 PROCEDURE — 70450 CT HEAD/BRAIN W/O DYE: CPT

## 2020-09-05 PROCEDURE — 99285 EMERGENCY DEPT VISIT HI MDM: CPT

## 2020-09-05 PROCEDURE — 93010 ELECTROCARDIOGRAM REPORT: CPT | Performed by: INTERNAL MEDICINE

## 2020-09-05 PROCEDURE — 84484 ASSAY OF TROPONIN QUANT: CPT | Performed by: EMERGENCY MEDICINE

## 2020-09-05 PROCEDURE — 85610 PROTHROMBIN TIME: CPT | Performed by: EMERGENCY MEDICINE

## 2020-09-05 PROCEDURE — 81001 URINALYSIS AUTO W/SCOPE: CPT | Performed by: EMERGENCY MEDICINE

## 2020-09-05 PROCEDURE — 93005 ELECTROCARDIOGRAM TRACING: CPT | Performed by: EMERGENCY MEDICINE

## 2020-09-05 PROCEDURE — 85730 THROMBOPLASTIN TIME PARTIAL: CPT | Performed by: EMERGENCY MEDICINE

## 2020-09-05 RX ORDER — ACETAMINOPHEN 160 MG/5ML
650 SOLUTION ORAL EVERY 4 HOURS PRN
Status: DISCONTINUED | OUTPATIENT
Start: 2020-09-05 | End: 2020-09-08

## 2020-09-05 RX ORDER — SODIUM CHLORIDE 0.9 % (FLUSH) 0.9 %
10 SYRINGE (ML) INJECTION AS NEEDED
Status: DISCONTINUED | OUTPATIENT
Start: 2020-09-05 | End: 2020-09-06

## 2020-09-05 RX ORDER — ONDANSETRON 2 MG/ML
4 INJECTION INTRAMUSCULAR; INTRAVENOUS EVERY 6 HOURS PRN
Status: DISCONTINUED | OUTPATIENT
Start: 2020-09-05 | End: 2020-09-11 | Stop reason: HOSPADM

## 2020-09-05 RX ORDER — DEXTROSE MONOHYDRATE 25 G/50ML
25 INJECTION, SOLUTION INTRAVENOUS
Status: DISCONTINUED | OUTPATIENT
Start: 2020-09-05 | End: 2020-09-11 | Stop reason: HOSPADM

## 2020-09-05 RX ORDER — ACETAMINOPHEN 650 MG/1
650 SUPPOSITORY RECTAL EVERY 4 HOURS PRN
Status: DISCONTINUED | OUTPATIENT
Start: 2020-09-05 | End: 2020-09-08

## 2020-09-05 RX ORDER — DEXTROSE MONOHYDRATE 25 G/50ML
25 INJECTION, SOLUTION INTRAVENOUS ONCE
Status: COMPLETED | OUTPATIENT
Start: 2020-09-05 | End: 2020-09-05

## 2020-09-05 RX ORDER — SODIUM CHLORIDE 0.9 % (FLUSH) 0.9 %
10 SYRINGE (ML) INJECTION EVERY 12 HOURS SCHEDULED
Status: DISCONTINUED | OUTPATIENT
Start: 2020-09-05 | End: 2020-09-06

## 2020-09-05 RX ORDER — SODIUM POLYSTYRENE SULFONATE 15 G/60ML
15 SUSPENSION ORAL; RECTAL ONCE
Status: COMPLETED | OUTPATIENT
Start: 2020-09-05 | End: 2020-09-05

## 2020-09-05 RX ORDER — ACETAMINOPHEN 325 MG/1
650 TABLET ORAL EVERY 4 HOURS PRN
Status: DISCONTINUED | OUTPATIENT
Start: 2020-09-05 | End: 2020-09-11 | Stop reason: HOSPADM

## 2020-09-05 RX ORDER — NITROGLYCERIN 0.4 MG/1
0.4 TABLET SUBLINGUAL
Status: DISCONTINUED | OUTPATIENT
Start: 2020-09-05 | End: 2020-09-11 | Stop reason: HOSPADM

## 2020-09-05 RX ORDER — NICOTINE POLACRILEX 4 MG
15 LOZENGE BUCCAL
Status: DISCONTINUED | OUTPATIENT
Start: 2020-09-05 | End: 2020-09-11 | Stop reason: HOSPADM

## 2020-09-05 RX ORDER — SODIUM CHLORIDE 9 MG/ML
100 INJECTION, SOLUTION INTRAVENOUS CONTINUOUS
Status: DISCONTINUED | OUTPATIENT
Start: 2020-09-05 | End: 2020-09-06

## 2020-09-05 RX ADMIN — SODIUM CHLORIDE 1000 ML: 9 INJECTION, SOLUTION INTRAVENOUS at 20:40

## 2020-09-05 RX ADMIN — SODIUM POLYSTYRENE SULFONATE 15 G: 15 SUSPENSION ORAL; RECTAL at 22:44

## 2020-09-05 RX ADMIN — SODIUM CHLORIDE, PRESERVATIVE FREE 10 ML: 5 INJECTION INTRAVENOUS at 23:43

## 2020-09-05 RX ADMIN — INSULIN HUMAN 10 UNITS: 100 INJECTION, SOLUTION PARENTERAL at 21:46

## 2020-09-05 RX ADMIN — DEXTROSE MONOHYDRATE 25 G: 500 INJECTION PARENTERAL at 21:46

## 2020-09-05 RX ADMIN — SODIUM CHLORIDE 1000 ML: 9 INJECTION, SOLUTION INTRAVENOUS at 22:04

## 2020-09-06 ENCOUNTER — APPOINTMENT (OUTPATIENT)
Dept: GENERAL RADIOLOGY | Facility: HOSPITAL | Age: 80
End: 2020-09-06

## 2020-09-06 PROBLEM — R62.7 FAILURE TO THRIVE IN ADULT: Chronic | Status: ACTIVE | Noted: 2020-09-06

## 2020-09-06 PROBLEM — Z79.01 CHRONIC ANTICOAGULATION: Chronic | Status: ACTIVE | Noted: 2020-09-06

## 2020-09-06 PROBLEM — Z79.52 CURRENT CHRONIC USE OF SYSTEMIC STEROIDS: Chronic | Status: ACTIVE | Noted: 2020-09-06

## 2020-09-06 LAB
ANION GAP SERPL CALCULATED.3IONS-SCNC: 7.8 MMOL/L (ref 5–15)
ANION GAP SERPL CALCULATED.3IONS-SCNC: 8.3 MMOL/L (ref 5–15)
BUN SERPL-MCNC: 35 MG/DL (ref 8–23)
BUN SERPL-MCNC: 36 MG/DL (ref 8–23)
BUN/CREAT SERPL: 15.7 (ref 7–25)
BUN/CREAT SERPL: 16.9 (ref 7–25)
CALCIUM SPEC-SCNC: 8.1 MG/DL (ref 8.6–10.5)
CALCIUM SPEC-SCNC: 8.2 MG/DL (ref 8.6–10.5)
CHLORIDE SERPL-SCNC: 105 MMOL/L (ref 98–107)
CHLORIDE SERPL-SCNC: 106 MMOL/L (ref 98–107)
CO2 SERPL-SCNC: 16.7 MMOL/L (ref 22–29)
CO2 SERPL-SCNC: 17.2 MMOL/L (ref 22–29)
CREAT SERPL-MCNC: 2.13 MG/DL (ref 0.76–1.27)
CREAT SERPL-MCNC: 2.23 MG/DL (ref 0.76–1.27)
DEPRECATED RDW RBC AUTO: 45.6 FL (ref 37–54)
ERYTHROCYTE [DISTWIDTH] IN BLOOD BY AUTOMATED COUNT: 13.1 % (ref 12.3–15.4)
GFR SERPL CREATININE-BSD FRML MDRD: 29 ML/MIN/1.73
GFR SERPL CREATININE-BSD FRML MDRD: 30 ML/MIN/1.73
GLUCOSE BLDC GLUCOMTR-MCNC: 174 MG/DL (ref 70–130)
GLUCOSE BLDC GLUCOMTR-MCNC: 242 MG/DL (ref 70–130)
GLUCOSE BLDC GLUCOMTR-MCNC: 302 MG/DL (ref 70–130)
GLUCOSE BLDC GLUCOMTR-MCNC: 87 MG/DL (ref 70–130)
GLUCOSE SERPL-MCNC: 163 MG/DL (ref 65–99)
GLUCOSE SERPL-MCNC: 285 MG/DL (ref 65–99)
HBA1C MFR BLD: 9 % (ref 4.8–5.6)
HCT VFR BLD AUTO: 29.6 % (ref 37.5–51)
HGB BLD-MCNC: 9.6 G/DL (ref 13–17.7)
MCH RBC QN AUTO: 31.2 PG (ref 26.6–33)
MCHC RBC AUTO-ENTMCNC: 32.4 G/DL (ref 31.5–35.7)
MCV RBC AUTO: 96.1 FL (ref 79–97)
PLATELET # BLD AUTO: 220 10*3/MM3 (ref 140–450)
PMV BLD AUTO: 9.3 FL (ref 6–12)
POTASSIUM SERPL-SCNC: 5.6 MMOL/L (ref 3.5–5.2)
POTASSIUM SERPL-SCNC: 5.6 MMOL/L (ref 3.5–5.2)
RBC # BLD AUTO: 3.08 10*6/MM3 (ref 4.14–5.8)
SODIUM SERPL-SCNC: 130 MMOL/L (ref 136–145)
SODIUM SERPL-SCNC: 131 MMOL/L (ref 136–145)
WBC # BLD AUTO: 7.49 10*3/MM3 (ref 3.4–10.8)

## 2020-09-06 PROCEDURE — 97110 THERAPEUTIC EXERCISES: CPT

## 2020-09-06 PROCEDURE — 80048 BASIC METABOLIC PNL TOTAL CA: CPT | Performed by: INTERNAL MEDICINE

## 2020-09-06 PROCEDURE — 36415 COLL VENOUS BLD VENIPUNCTURE: CPT | Performed by: NURSE PRACTITIONER

## 2020-09-06 PROCEDURE — 82962 GLUCOSE BLOOD TEST: CPT

## 2020-09-06 PROCEDURE — 97162 PT EVAL MOD COMPLEX 30 MIN: CPT

## 2020-09-06 PROCEDURE — 80048 BASIC METABOLIC PNL TOTAL CA: CPT | Performed by: NURSE PRACTITIONER

## 2020-09-06 PROCEDURE — 63710000001 INSULIN LISPRO (HUMAN) PER 5 UNITS: Performed by: NURSE PRACTITIONER

## 2020-09-06 PROCEDURE — 71045 X-RAY EXAM CHEST 1 VIEW: CPT

## 2020-09-06 PROCEDURE — 85027 COMPLETE CBC AUTOMATED: CPT | Performed by: NURSE PRACTITIONER

## 2020-09-06 PROCEDURE — 25010000002 ONDANSETRON PER 1 MG: Performed by: NURSE PRACTITIONER

## 2020-09-06 PROCEDURE — 83036 HEMOGLOBIN GLYCOSYLATED A1C: CPT | Performed by: NURSE PRACTITIONER

## 2020-09-06 PROCEDURE — 74018 RADEX ABDOMEN 1 VIEW: CPT

## 2020-09-06 RX ORDER — HYDRALAZINE HYDROCHLORIDE 25 MG/1
25 TABLET, FILM COATED ORAL EVERY 8 HOURS SCHEDULED
Status: DISCONTINUED | OUTPATIENT
Start: 2020-09-06 | End: 2020-09-09

## 2020-09-06 RX ORDER — AMOXICILLIN 250 MG
1 CAPSULE ORAL NIGHTLY
Status: DISCONTINUED | OUTPATIENT
Start: 2020-09-06 | End: 2020-09-11 | Stop reason: HOSPADM

## 2020-09-06 RX ORDER — SULFAMETHOXAZOLE AND TRIMETHOPRIM 800; 160 MG/1; MG/1
1 TABLET ORAL 2 TIMES DAILY
COMMUNITY
End: 2020-09-11 | Stop reason: HOSPADM

## 2020-09-06 RX ORDER — SODIUM POLYSTYRENE SULFONATE 15 G/60ML
30 SUSPENSION ORAL; RECTAL ONCE
Status: COMPLETED | OUTPATIENT
Start: 2020-09-06 | End: 2020-09-06

## 2020-09-06 RX ORDER — DEXAMETHASONE 2 MG/1
2 TABLET ORAL
Status: DISCONTINUED | OUTPATIENT
Start: 2020-09-07 | End: 2020-09-10

## 2020-09-06 RX ORDER — POLYETHYLENE GLYCOL 3350 17 G/17G
17 POWDER, FOR SOLUTION ORAL DAILY
Status: DISCONTINUED | OUTPATIENT
Start: 2020-09-06 | End: 2020-09-11 | Stop reason: HOSPADM

## 2020-09-06 RX ADMIN — APIXABAN 2.5 MG: 2.5 TABLET, FILM COATED ORAL at 16:35

## 2020-09-06 RX ADMIN — DOCUSATE SODIUM 50MG AND SENNOSIDES 8.6MG 1 TABLET: 8.6; 5 TABLET, FILM COATED ORAL at 20:20

## 2020-09-06 RX ADMIN — SODIUM CHLORIDE 100 ML/HR: 9 INJECTION, SOLUTION INTRAVENOUS at 13:37

## 2020-09-06 RX ADMIN — INSULIN LISPRO 7 UNITS: 100 INJECTION, SOLUTION INTRAVENOUS; SUBCUTANEOUS at 08:22

## 2020-09-06 RX ADMIN — APIXABAN 2.5 MG: 2.5 TABLET, FILM COATED ORAL at 20:20

## 2020-09-06 RX ADMIN — SODIUM CHLORIDE 100 ML/HR: 9 INJECTION, SOLUTION INTRAVENOUS at 01:36

## 2020-09-06 RX ADMIN — POLYETHYLENE GLYCOL 3350 17 G: 17 POWDER, FOR SOLUTION ORAL at 16:35

## 2020-09-06 RX ADMIN — HYDRALAZINE HYDROCHLORIDE 25 MG: 25 TABLET, FILM COATED ORAL at 20:20

## 2020-09-06 RX ADMIN — INSULIN LISPRO 2 UNITS: 100 INJECTION, SOLUTION INTRAVENOUS; SUBCUTANEOUS at 17:59

## 2020-09-06 RX ADMIN — HYDRALAZINE HYDROCHLORIDE 25 MG: 25 TABLET, FILM COATED ORAL at 16:34

## 2020-09-06 RX ADMIN — SODIUM POLYSTYRENE SULFONATE 30 G: 15 SUSPENSION ORAL; RECTAL at 16:35

## 2020-09-06 RX ADMIN — SODIUM BICARBONATE: 84 INJECTION, SOLUTION INTRAVENOUS at 16:35

## 2020-09-06 RX ADMIN — ONDANSETRON 4 MG: 2 INJECTION INTRAMUSCULAR; INTRAVENOUS at 01:37

## 2020-09-06 NOTE — CONSULTS
Consults    Patient Care Team:  Shantell Alvarez PA-C as PCP - General (Family Medicine)  Asia Hilton APRN as Nurse Practitioner (Endocrinology)  Aureliano Linares MD as Consulting Physician (Urology)  Osmin Fan MD as Consulting Physician (Nephrology)  Vignesh Babin MD as Consulting Physician (Gastroenterology)    Chief complaint:acute on ckd III    Subjective     History of Present Illness  Patient is a 80 year old male with history of ckd III followed by Dr Fan. baseilne cr 1.2.  Admitted with weakness found to have hyperkalemia and acute renal failure.  S/p medical treatment.currently without complaints. Son at bedside    Review of Systems     Past Medical History:   Diagnosis Date   • Type 2 diabetes mellitus (CMS/[facility])    • Vitamin D deficiency        Objective      Vital Signs  Temp:  [97.7 °F (36.5 °C)-98.3 °F (36.8 °C)] 98 °F (36.7 °C)  Heart Rate:  [72-84] 81  Resp:  [16-20] 20  BP: (133-210)/(72-89) 180/89    Physical Exam  General Appearance:  In no acute distress.    HEENT: Normal HEENT exam.     Extremities: .  There is no deformity, effusion or dependent edema.    Neurological: Patient is alert and oriented to person, place and time.    Skin:  Warm and dry.  No rash.   Abdomen: Abdomen is soft and non-distended.    Results Review:    I reviewed the patient's new clinical results.        Assessment/Plan       TIAGO (acute kidney injury) (CMS/[facility])    Hyperlipidemia    Benign essential HTN    CKD (chronic kidney disease) stage 3, GFR 30-59 ml/min (CMS/[facility])    Type 2 diabetes mellitus with diabetic chronic kidney disease (CMS/[facility])    Generalized weakness    Hyperkalemia      Assessment & Plan  1. tiago  2. ckd III  3. Hyperkalemia  4. T2DM  5. Hypertension  6. Metabolic acidosis    Patient seen and examined. Labs reviewed. With h/o ckd III with baseline cr of 1.2.  Admitted for weakness found to have tiago and hyperkalemia with cr up to 2.7 and potassium of 6.4.  S/p ivf and medical treatment.   Repeat labs show improving renal function and potassium of 5.6.  Appears volume deplete on exam and was on lisinopril.  Likely cause of ton and hyperkalemia.  Will give another dose of kayexealte. With metabolic acidosis. Change ivf to add bicarb.  bp elevated. Add hydralazine.. Will f/u labs in am    Margi Croft MD  09/06/20  12:40

## 2020-09-06 NOTE — ED TRIAGE NOTES
Pt arrives via EMS from home. Pt c/o generalized weakness x 2 days. Pt is currently being treated for kidney infection. Wife called EMS for stroke like symptoms stating pt was leaning to the right. Pt states he always leans to either side. Pt alert and oriented x 3. No acute neuro deficits. NAD. Pt masked at triage.

## 2020-09-06 NOTE — THERAPY EVALUATION
Patient Name: Courtney Medrano  : 1940    MRN: [id number]                              Today's Date: 2020       Admit Date: 2020    Visit Dx:     ICD-10-CM ICD-9-CM   1. Generalized weakness R53.1 780.79   2. Acute renal insufficiency N28.9 593.9   3. Hyperkalemia E87.5 276.7     Patient Active Problem List   Diagnosis   • Anxiety disorder   • Recurrent major depressive disorder, in full remission (CMS/[facility])   • Constipation   • Diabetes type 2, uncontrolled (CMS/[facility])   • GERD (gastroesophageal reflux disease)   • Hyperlipidemia   • Low back pain   • Prostatitis   • Pulmonary nodule   • Diabetic peripheral neuropathy (CMS/[facility])   • Chronic fatigue   • Vitamin D deficiency   • Noncompliance of patient with dietary regimen   • Uncontrolled type 2 diabetes mellitus (CMS/[facility])   • Constipation   • Leg pain   • Benign essential HTN   • Noncompliance with medications   • Perirectal abscess   • Anxiety   • CKD (chronic kidney disease) stage 3, GFR 30-59 ml/min (CMS/[facility])   • Anemia of chronic disease   • Low hemoglobin   • PAD (peripheral artery disease) (CMS/[facility])   • Polyp of colon   • Pyelonephritis   • Hematuria   • Tobacco use   • Type 2 diabetes mellitus with diabetic chronic kidney disease (CMS/[facility])   • Contusion of rib on right side   • Weight loss   • Generalized weakness   • Hyperkalemia   • TIAGO (acute kidney injury) (CMS/[facility])   • Current chronic use of systemic steroids   • Failure to thrive in adult   • Chronic anticoagulation     Past Medical History:   Diagnosis Date   • Type 2 diabetes mellitus (CMS/[facility])    • Vitamin D deficiency      Past Surgical History:   Procedure Laterality Date   • BACK SURGERY     • CYSTOSCOPY W/ URETERAL STENT PLACEMENT Left 2020    Procedure: CYSTOSCOPY URETERAL CATHETER/STENT INSERTION;  Surgeon: Clark Ramirez MD;  Location: [facility];  Service: Urology   • KIDNEY STONE SURGERY     • URINARY SURGERY      stent placement     General Information     Row  "Name 09/06/20 1553          PT Evaluation Time/Intention    Document Type  evaluation  -EM     Mode of Treatment  individual therapy;physical therapy  -EM     Row Name 09/06/20 1553          General Information    Patient Profile Reviewed?  yes  -EM     Prior Level of Function  independent:;all household mobility pt states he has been very \"weak\" since January 2020, acute decline in function over past week   -EM     Existing Precautions/Restrictions  fall  -EM     Row Name 09/06/20 1553          Relationship/Environment    Lives With  spouse;child(christiano), adult  -EM     Row Name 09/06/20 1553          Resource/Environmental Concerns    Current Living Arrangements  home/apartment/condo  -EM     Row Name 09/06/20 1553          Home Main Entrance    Number of Stairs, Main Entrance  three  -EM     Row Name 09/06/20 1553          Stairs Within Home, Primary    Number of Stairs, Within Home, Primary  none  -EM     Row Name 09/06/20 1553          Cognitive Assessment/Intervention- PT/OT    Orientation Status (Cognition)  oriented x 3  -EM     Row Name 09/06/20 1553          Safety Issues, Functional Mobility    Impairments Affecting Function (Mobility)  balance;coordination;endurance/activity tolerance;strength  -EM       User Key  (r) = Recorded By, (t) = Taken By, (c) = Cosigned By    Initials Name Provider Type    EM Brenna Slater, PT Physical Therapist        Mobility     Row Name 09/06/20 1554          Bed Mobility Assessment/Treatment    Bed Mobility Assessment/Treatment  supine-sit;sit-supine  -EM     Supine-Sit [place] (Bed Mobility)  minimum assist (75% patient effort)  -EM     Sit-Supine [place] (Bed Mobility)  minimum assist (75% patient effort)  -EM     Assistive Device (Bed Mobility)  head of bed elevated;bed rails  -EM     Row Name 09/06/20 1554          Sit-Stand Transfer    Sit-Stand [place] (Transfers)  minimum assist (75% patient effort);2 person assist  -EM     Assistive Device " (Sit-Stand Transfers)  walker, front-wheeled  -EM     Row Name 09/06/20 1554          Gait/Stairs Assessment/Training    Gait/Stairs Assessment/Training  gait/ambulation independence  -EM     [place] Level (Gait)  minimum assist (75% patient effort);2 person assist  -EM     Assistive Device (Gait)  walker, front-wheeled  -EM     Distance in Feet (Gait)  12  -EM     Deviations/Abnormal Patterns (Gait)  gait speed decreased;stride length decreased  -EM     Bilateral Gait Deviations  weight shift ability decreased  -EM     Comment (Gait/Stairs)  leaning posteriolateral to R, decreased step length with R   -EM       User Key  (r) = Recorded By, (t) = Taken By, (c) = Cosigned By    Initials Name Provider Type    EM Brenna Slater, PT Physical Therapist        Obj/Interventions     Row Name 09/06/20 1556          General ROM    GENERAL ROM COMMENTS  ROM WNL   -EM     Row Name 09/06/20 1556          MMT (Manual Muscle Testing)    General MMT Comments  no focal deficits identified, generalized weakness noted   -EM     Row Name 09/06/20 1556          Static Sitting Balance    Level of [place] (Unsupported Sitting, Static Balance)  contact guard assist  -EM     Sitting Position (Unsupported Sitting, Static Balance)  sitting on edge of bed  -EM     Row Name 09/06/20 1556          Dynamic Sitting Balance    Level of [place], Reaches Outside Midline (Sitting, Dynamic Balance)  minimal assist, 75% patient effort  -EM     Sitting Position, Reaches Outside Midline (Sitting, Dynamic Balance)  sitting on edge of bed  -EM     Row Name 09/06/20 1556          Static Standing Balance    Level of [place] (Supported Standing, Static Balance)  minimal assist, 75% patient effort  -EM     Assistive Device Utilized (Supported Standing, Static Balance)  walker, rolling  -EM     Row Name 09/06/20 1556          Dynamic Standing Balance    Level of [place], Reaches Outside Midline (Standing, Dynamic Balance)  minimal  assist, 75% patient effort;2 person assist  -EM     Assistive Device Utilized (Supported Standing, Dynamic Balance)  walker, rolling  -EM       User Key  (r) = Recorded By, (t) = Taken By, (c) = Cosigned By    Initials Name Provider Type    Brenna Joyce, PT Physical Therapist        Goals/Plan     Row Name 09/06/20 1602          Bed Mobility Goal 1 (PT)    Activity/Assistive Device (Bed Mobility Goal 1, PT)  bed mobility activities, all  -EM     [place] Level/Cues Needed (Bed Mobility Goal 1, PT)  contact guard assist  -EM     Time Frame (Bed Mobility Goal 1, PT)  1 week  -EM     Row Name 09/06/20 1602          Transfer Goal 1 (PT)    Activity/Assistive Device (Transfer Goal 1, PT)  transfers, all;walker, rolling  -EM     [place] Level/Cues Needed (Transfer Goal 1, PT)  contact guard assist  -EM     Time Frame (Transfer Goal 1, PT)  1 week  -EM     Row Name 09/06/20 1602          Gait Training Goal 1 (PT)    Activity/Assistive Device (Gait Training Goal 1, PT)  gait (walking locomotion);walker, rolling  -EM     [place] Level (Gait Training Goal 1, PT)  minimum assist (75% or more patient effort)  -EM     Distance (Gait Goal 1, PT)  50  -EM     Time Frame (Gait Training Goal 1, PT)  1 week  -EM       User Key  (r) = Recorded By, (t) = Taken By, (c) = Cosigned By    Initials Name Provider Type    Brenna Joyce, PT Physical Therapist        Clinical Impression     Row Name 09/06/20 1601          Pain Assessment    Additional Documentation  Pain Scale: Numbers Pre/Post-Treatment (Group)  -EM     Row Name 09/06/20 1601          Pain Scale: Numbers Pre/Post-Treatment    Pain Scale: Numbers, Pretreatment  0/10 - no pain  -EM     Row Name 09/06/20 1601          Plan of Care Review    Plan of Care Reviewed With  patient;son  -EM     Row Name 09/06/20 1601          Physical Therapy Clinical Impression    Patient/Family Goals Statement (PT Clinical Impression)  get stronger   -EM      Criteria for Skilled Interventions Met (PT Clinical Impression)  yes;treatment indicated  -EM     Rehab Potential (PT Clinical Summary)  fair, will monitor progress closely  -EM     Row Name 09/06/20 1601          Positioning and Restraints    Pre-Treatment Position  in bed  -EM     Post Treatment Position  bed  -EM     In Bed  supine;call light within reach;with family/caregiver;exit alarm on;notified nsg  -EM       User Key  (r) = Recorded By, (t) = Taken By, (c) = Cosigned By    Initials Name Provider Type    Brenna Joyce PT Physical Therapist        Outcome Measures     Row Name 09/06/20 1603          How much help from another person do you currently need...    Turning from your back to your side while in flat bed without using bedrails?  3  -EM     Moving from lying on back to sitting on the side of a flat bed without bedrails?  2  -EM     Moving to and from a bed to a chair (including a wheelchair)?  3  -EM     Standing up from a chair using your arms (e.g., wheelchair, bedside chair)?  3  -EM     Climbing 3-5 steps with a railing?  2  -EM     To walk in hospital room?  3  -EM     AM-PAC 6 Clicks Score (PT)  16  -EM     Row Name 09/06/20 1603          Functional Assessment    Outcome Measure Options  AM-PAC 6 Clicks Basic Mobility (PT)  -EM       User Key  (r) = Recorded By, (t) = Taken By, (c) = Cosigned By    Initials Name Provider Type    Brenna Joyce PT Physical Therapist        Physical Therapy Education                 Title: PT OT SLP Therapies (In Progress)     Topic: Physical Therapy (In Progress)     Point: Mobility training (In Progress)     Description:   Instruct learner(s) on safety and technique for assisting patient out of bed, chair or wheelchair.  Instruct in the proper use of assistive devices, such as walker, crutches, cane or brace.              Patient Friendly Description:   It's important to get you on your feet again, but we need to do so in a way that is safe  for you. Falling has serious consequences, and your personal safety is the most important thing of all.        When it's time to get out of bed, one of us or a family member will sit next to you on the bed to give you support.     If your doctor or nurse tells you to use a walker, crutches, a cane, or a brace, be sure you use it every time you get out of bed, even if you think you don't need it.    Learning Progress Summary           Patient Acceptance, E, NR by EM at 9/6/2020 1604    Acceptance, E, VU by AS at 9/6/2020 0451                   Point: Home exercise program (Done)     Description:   Instruct learner(s) on appropriate technique for monitoring, assisting and/or progressing patient with therapeutic exercises and activities.              Learning Progress Summary           Patient Acceptance, E, VU by AS at 9/6/2020 0451                   Point: Body mechanics (Done)     Description:   Instruct learner(s) on proper positioning and spine alignment for patient and/or caregiver during mobility tasks and/or exercises.              Learning Progress Summary           Patient Acceptance, E, VU by AS at 9/6/2020 0451                   Point: Precautions (Done)     Description:   Instruct learner(s) on prescribed precautions during mobility and gait tasks              Learning Progress Summary           Patient Acceptance, E, VU by AS at 9/6/2020 0451                               User Key     Initials Effective Dates Name Provider Type Discipline    EM 04/03/18 -  Brenna Slater PT Physical Therapist PT    AS 08/10/20 -  Selwyn Lopez, RN Registered Nurse Nurse              PT Recommendation and Plan  Planned Therapy Interventions (PT Eval): bed mobility training, gait training, home exercise program, transfer training, patient/family education, balance training  Outcome Summary/Treatment Plan (PT)  Anticipated Discharge Disposition (PT): skilled nursing facility  Plan of Care Reviewed With: patient,  son  Outcome Summary: Patient is a 80 y.o. male admitted to [facility] for generalized weakness, acute kidney injury on 9/5/2020. PMHx includes DM, HTN, CKD. Patient is independent at baseline and lives with his family. Patient reports he uses a rwx but unable to ambulate any significant distance, also reports multiple falls. Today, patient performed bed mobility with Jarrett, required Jarrett for transfers, and ambulated 12 feet with rwx and minAx2.  Patient demonstrates impairments consisting of generalized weakness, decreased activity tolerance, decreased balance. Patient may benefit from skilled PT services acutely to address functional deficits as well as improve level of independence prior to discharge. Anticipate SNU upon DC.     Time Calculation:   PT Charges     Row Name 09/06/20 1609             Time Calculation    Start Time  1425  -EM      Stop Time  1445  -EM      Time Calculation (min)  20 min  -EM      PT Received On  09/06/20  -EM      PT - Next Appointment  09/07/20  -EM      PT Goal Re-Cert Due Date  09/13/20  -EM         Time Calculation- PT    Total Timed Code Minutes- PT  10 minute(s)  -EM        User Key  (r) = Recorded By, (t) = Taken By, (c) = Cosigned By    Initials Name Provider Type    EM Brenna Slater, PT Physical Therapist        Therapy Charges for Today     Code Description Service Date Service Provider Modifiers Qty    51143907190 HC PT EVAL MOD COMPLEXITY 2 9/6/2020 Brenna Slater, PT GP 1    51125595033 HC PT THER PROC EA 15 MIN 9/6/2020 Brenna Slater, PT GP 1    14193166276 HC PT THER SUPP EA 15 MIN 9/6/2020 Brenna Slater, PT GP 1          PT G-Codes  Outcome Measure Options: AM-PAC 6 Clicks Basic Mobility (PT)  AM-PAC 6 Clicks Score (PT): 16    Brenna Slater PT  9/6/2020

## 2020-09-06 NOTE — PLAN OF CARE
Problem: Patient Care Overview  Goal: Plan of Care Review  Flowsheets  Taken 9/6/2020 1601  Plan of Care Reviewed With: patient;son  Taken 9/6/2020 1604  Outcome Summary: Patient is a 80 y.o. male admitted to [facility] for generalized weakness, acute kidney injury on 9/5/2020. PMHx includes DM, HTN, CKD. Patient is independent at baseline and lives with his family. Patient reports he uses a rwx but unable to ambulate any significant distance, also reports multiple falls. Today, patient performed bed mobility with [place], required [place] for transfers, and ambulated 12 feet with rwx and minAx2.  Patient demonstrates impairments consisting of generalized weakness, decreased activity tolerance, decreased balance. Patient may benefit from skilled PT services acutely to address functional deficits as well as improve level of independence prior to discharge. Anticipate SNU upon DC.   Patient was wearing a face mask during this therapy encounter. Therapist used appropriate personal protective equipment including eye protection, mask, and gloves.  Mask used was standard procedure mask. Appropriate PPE was worn during the entire therapy session. Hand hygiene was completed before and after therapy session. Patient is not in enhanced droplet precautions.

## 2020-09-06 NOTE — H&P
Patient Name:  Courtney Medrano  YOB: 1940  MRN:  [id number]  Admit Date:  9/5/2020  Patient Care Team:  Shantell Alvarez PA-C as PCP - General (Family Medicine)  Asia Hilton APRN as Nurse Practitioner (Endocrinology)  Aureliano Linares MD as Consulting Physician (Urology)  Osmin Fan MD as Consulting Physician (Nephrology)  Vignesh Babin MD as Consulting Physician (Gastroenterology)      Chief Complaint   Patient presents with   • Weakness - Generalized       Subjective     Mr. Medrano is a 80 y.o. male with a history of DM2, HLD, HTN that presents to [facility] complaining of weakness. Patient's son who is his POA is at bedside to assist with history reports that patient has been weak for about a year now, but progressively worse in the past 3 days. He reports that patient was slightly confused, though patient does not appear confused on exam. Patient has reportedly been unable to care for himself at home, though family doesn't necessarily request nursing home placement either. Patient states that he has had a decreased appetite and has been eating about half of what he normally does. He also reports occasional flank pain, though denies urinary complaints otherwise. Patient does report that he has been constipated and hasn't had a bowel movement in a week and a half. Patient denies fever, chest pain, shortness of breath, abdominal pain, urinary symptoms or edema. Son at bedside reports that he has noticed that patient does exhibit exertional dyspnea and has also noticed a cough recently as well. Workup done tonight shows TIAGO and hyperkalemia. He was given insulin, dextrose and kayexelate in ED along with IV fluids. CT head still pending at this time.     History of Present Illness    Past Medical History:   Diagnosis Date   • Type 2 diabetes mellitus (CMS/HCC)    • Vitamin D deficiency      Past Surgical History:   Procedure Laterality Date   • BACK SURGERY     •  CYSTOSCOPY W/ URETERAL STENT PLACEMENT Left 1/11/2020    Procedure: CYSTOSCOPY URETERAL CATHETER/STENT INSERTION;  Surgeon: Clark Ramirez MD;  Location: [facility];  Service: Urology   • KIDNEY STONE SURGERY     • URINARY SURGERY      stent placement     History reviewed. No pertinent family history.  Social History     Tobacco Use   • Smoking status: Current Every Day Smoker     Types: Cigars   • Smokeless tobacco: Never Used   • Tobacco comment: 1 cigar/day   Substance Use Topics   • Alcohol use: No   • Drug use: No       (Not in a hospital admission)  Allergies:    Allergies   Allergen Reactions   • Flexeril [Cyclobenzaprine] Hives       Review of Systems   Constitutional: Positive for appetite change. Negative for chills and fever.   HENT: Negative.  Negative for congestion and sore throat.    Eyes: Negative.  Negative for visual disturbance.   Respiratory: Negative.  Negative for cough and shortness of breath.    Cardiovascular: Negative.  Negative for chest pain.   Gastrointestinal: Positive for constipation. Negative for abdominal distention, nausea and vomiting.   Endocrine: Negative.    Genitourinary: Positive for flank pain. Negative for dysuria, frequency and urgency.   Musculoskeletal: Negative for arthralgias and myalgias.   Skin: Negative.  Negative for color change and pallor.   Allergic/Immunologic: Negative.    Neurological: Positive for weakness. Negative for dizziness.   Hematological: Negative.    Psychiatric/Behavioral: Negative.  Negative for agitation, behavioral problems and confusion.        Objective    Vital Signs  Temp:  [97.7 °F (36.5 °C)-98.3 °F (36.8 °C)] 98.3 °F (36.8 °C)  Heart Rate:  [72-82] 79  Resp:  [16-18] 16  BP: (133-210)/(72-89) 210/89  SpO2:  [97 %-100 %] 100 %  on   ;      Body mass index is 17.07 kg/m².    Physical Exam   Constitutional: He is oriented to person, place, and time. Vital signs are normal. He appears cachectic. He appears ill. No distress.   HENT:    Head: Normocephalic and atraumatic.   Eyes: EOM are normal.   Neck: Normal range of motion. Neck supple.   Cardiovascular: Normal rate, regular rhythm and intact distal pulses.   Pulmonary/Chest: Effort normal. He has decreased breath sounds in the right lower field and the left lower field.   Abdominal: Soft. Bowel sounds are decreased. There is no tenderness.   Musculoskeletal: Normal range of motion. He exhibits no edema.   Neurological: He is alert and oriented to person, place, and time.   Skin: Skin is warm and dry. He is not diaphoretic. There is pallor.   Psychiatric: He has a normal mood and affect. His behavior is normal. Judgment and thought content normal.   Nursing note and vitals reviewed.      Results Review:   I reviewed the patient's new clinical results including all labs and xrays.    Lab Results (last 24 hours)     Procedure Component Value Units Date/Time    CBC & Differential [[id number]] Collected:  09/05/20 2039    Specimen:  Blood Updated:  09/05/20 2050    Narrative:       The following orders were created for panel order CBC & Differential.  Procedure                               Abnormality         Status                     ---------                               -----------         ------                     CBC Auto Differential[[id number]]        Abnormal            Final result                 Please view results for these tests on the individual orders.    Comprehensive Metabolic Panel [[id number]]  (Abnormal) Collected:  09/05/20 2039    Specimen:  Blood Updated:  09/05/20 2120     Glucose 235 mg/dL      BUN 45 mg/dL      Creatinine 2.70 mg/dL      Sodium 131 mmol/L      Potassium 6.4 mmol/L      Chloride 99 mmol/L      CO2 16.7 mmol/L      Calcium 9.5 mg/dL      Total Protein 6.3 g/dL      Albumin 4.30 g/dL      ALT (SGPT) 7 U/L      AST (SGOT) 12 U/L      Alkaline Phosphatase 63 U/L      Total Bilirubin 0.2 mg/dL      eGFR Non African Amer 23 mL/min/1.73      Globulin 2.0 gm/dL        A/G Ratio 2.2 g/dL      BUN/Creatinine Ratio 16.7     Anion Gap 15.3 mmol/L     Narrative:       GFR Normal >60  Chronic Kidney Disease <60  Kidney Failure <15      Protime-INR [[id number]]  (Normal) Collected:  09/05/20 2039    Specimen:  Blood Updated:  09/05/20 2104     Protime 13.9 Seconds      INR 1.09    aPTT [[id number]]  (Normal) Collected:  09/05/20 2039    Specimen:  Blood Updated:  09/05/20 2104     PTT 28.1 seconds     Troponin [[id number]]  (Normal) Collected:  09/05/20 2039    Specimen:  Blood Updated:  09/05/20 2113     Troponin T 0.014 ng/mL     Narrative:       Troponin T Reference Range:  <= 0.03 ng/mL-   Negative for AMI  >0.03 ng/mL-     Abnormal for myocardial necrosis.  Clinicians would have to utilize clinical acumen, EKG, Troponin and serial changes to determine if it is an Acute Myocardial Infarction or myocardial injury due to an underlying chronic condition.       Results may be falsely decreased if patient taking Biotin.      CBC Auto Differential [[id number]]  (Abnormal) Collected:  09/05/20 2039    Specimen:  Blood Updated:  09/05/20 2050     WBC 8.25 10*3/mm3      RBC 3.39 10*6/mm3      Hemoglobin 10.6 g/dL      Hematocrit 33.0 %      MCV 97.3 fL      MCH 31.3 pg      MCHC 32.1 g/dL      RDW 12.9 %      RDW-SD 45.5 fl      MPV 9.6 fL      Platelets 264 10*3/mm3      Neutrophil % 62.4 %      Lymphocyte % 30.5 %      Monocyte % 5.9 %      Eosinophil % 0.2 %      Basophil % 0.2 %      Immature Grans % 0.8 %      Neutrophils, Absolute 5.13 10*3/mm3      Lymphocytes, Absolute 2.52 10*3/mm3      Monocytes, Absolute 0.49 10*3/mm3      Eosinophils, Absolute 0.02 10*3/mm3      Basophils, Absolute 0.02 10*3/mm3      Immature Grans, Absolute 0.07 10*3/mm3      nRBC 0.0 /100 WBC     Urinalysis With Microscopic If Indicated (No Culture) - Urine, Clean Catch [[id number]]  (Abnormal) Collected:  09/05/20 2100    Specimen:  Urine, Clean Catch Updated:  09/05/20 2112     Color, UA Yellow     Appearance,  UA Clear     pH, UA >=9.0     Specific Gravity, UA 1.017     Glucose,  mg/dL (Trace)     Ketones, UA Negative     Bilirubin, UA Negative     Blood, UA Negative     Protein, UA 30 mg/dL (1+)     Leuk Esterase, UA Negative     Nitrite, UA Negative     Urobilinogen, UA 0.2 E.U./dL    Urinalysis, Microscopic Only - Urine, Clean Catch [[id number]] Collected:  09/05/20 2100    Specimen:  Urine, Clean Catch Updated:  09/05/20 2112     RBC, UA 0-2 /HPF      WBC, UA 0-2 /HPF      Bacteria, UA None Seen /HPF      Squamous Epithelial Cells, UA 0-2 /HPF      Hyaline Casts, UA 0-2 /LPF      Methodology Automated Microscopy    COVID PRE-OP / PRE-PROCEDURE SCREENING ORDER (NO ISOLATION) - Swab, Nasopharynx [[id number]] Collected:  09/05/20 2208    Specimen:  Swab from Nasopharynx Updated:  09/05/20 2221    Narrative:       The following orders were created for panel order COVID PRE-OP / PRE-PROCEDURE SCREENING ORDER (NO ISOLATION) - Swab, Nasopharynx.  Procedure                               Abnormality         Status                     ---------                               -----------         ------                     COVID-19,BIOTAP, NP/OP S...[[id number]]                      In process                   Please view results for these tests on the individual orders.    COVID-19,BIOTAP, NP/OP SWAB IN TRANSPORT MEDIA OR SALINE 24-36 HR TAT - Swab, Nasopharynx [[id number]] Collected:  09/05/20 2208    Specimen:  Swab from Nasopharynx Updated:  09/05/20 2221          CT Head Without Contrast   Final Result   No acute findings.       Radiation dose reduction techniques were utilized, including automated   exposure control and exposure modulation based on body size.       This report was finalized on 9/5/2020 10:42 PM by Dr. Concetta Cardenas M.D.          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     TIAGO on CKD3  -creatinine 2.7 tonight, up from baseline of 1.8, likely from decreased oral intake and decreased appetite  -IVF overnight  -avoid nephrotoxins  -labs in AM  -will hold home dose lisinopril    Generalized weakness  -no other obvious sources noted at this time, though with reports of cough and shortness of breath per son, will check CXR  -CT head pending  -PT to see  -likely needs placement, will consult CCP    Hyperkalemia  -reports no BM in week and a half?  -no noted EKG changes  -given insulin, dextrose and kayexelate in ED  -recheck in AM    DM2  -hold home dose metformin and start moderate dose correctional factor insulin  -a1c in AM    HTN/HLD  -stable, may continue home medications with exception of above    VTE Ppx  -SCDs    CODE status  -full    I discussed the patients findings and my recommendations with patient.    RUSSELL Cam  [facility]  09/05/20  10:18 PM

## 2020-09-06 NOTE — PLAN OF CARE
Patient had an uneventful day. Nephrology consulted. Patient's son remained at bedside majority of the afternoon. Denies pain. Patient is a 2 -3 person assist with walker if needed to get up. Kayexalate given for potassium level and miralax due to no bm x 2weeks per patient. KUB resulted with mod amount of stool. Patient is incontinent of stool and urine. Needs reinforcement to turn and for intake.

## 2020-09-06 NOTE — PLAN OF CARE
Problem: Patient Care Overview  Goal: Plan of Care Review  Flowsheets  Taken 9/6/2020 7481  Progress: no change  Outcome Summary: Patient present from ED for generalized weakness, hyperkaemia, and ton. Patient has some situational disorientation. Paient is unable to ambulate alone and very unsteady on his feet. Patient is incontinent. Patient has NS infusing at 100ml/hr. Will continue to monitor.  Taken 9/5/2020 8081  Plan of Care Reviewed With: patient

## 2020-09-06 NOTE — PROGRESS NOTES
"   LOS: 1 day   Patient Care Team:  Shantell Alvarez PA-C as PCP - General (Family Medicine)  Asia Hilton APRN as Nurse Practitioner (Endocrinology)  Aureliano Linares MD as Consulting Physician (Urology)  Osmin Fan MD as Consulting Physician (Nephrology)  Vignesh Babin MD as Consulting Physician (Gastroenterology)    Chief Complaint: tired    Subjective     Pt feels the same as yesterday. No change. Denies SOA or cough. No chest pain. No N/V. No abd pain. Poor appetite. Voiding okay. No BM.      Subjective:  Symptoms:  Stable.  He reports malaise, weakness and anorexia.  No shortness of breath, cough, chest pain, headache, chest pressure, diarrhea or anxiety.    Diet:  Poor intake.  No nausea or vomiting.    Activity level: Impaired due to weakness.    Pain:  He reports no pain.        History taken from: patient chart RN    Objective     Vital Signs  Temp:  [97.7 °F (36.5 °C)-98.3 °F (36.8 °C)] 98 °F (36.7 °C)  Heart Rate:  [72-84] 81  Resp:  [16-20] 20  BP: (133-210)/(72-89) 180/89  I/O last 3 completed shifts:  In: 1000 [IV Piggyback:1000]  Out: -   No intake/output data recorded.        Objective:  General Appearance:  Comfortable, in no acute distress and ill-appearing (cachectic).    Vital signs: (most recent): Blood pressure 180/89, pulse 81, temperature 98 °F (36.7 °C), temperature source Oral, resp. rate 20, height 170.2 cm (67\"), weight 57.7 kg (127 lb 3.2 oz), SpO2 97 %.  Vital signs are normal.  No fever.  (BPs high).    Output: Producing urine and no stool output.    HEENT: Normal HEENT exam.    Lungs:  Normal effort and normal respiratory rate.  Breath sounds clear to auscultation.  He is not in respiratory distress.    Heart: Normal rate.  Regular rhythm.    Abdomen: Abdomen is soft.  Bowel sounds are normal.   There is no abdominal tenderness.     Extremities: There is no dependent edema.    Pulses: Distal pulses are intact.    Neurological: Patient is alert and oriented to person, " place and time.  Patient has normal muscle tone.  (General weakness).    Pupils:  Pupils are equal, round, and reactive to light.    Skin:  Warm, dry and pale.  No rash.             Results Review:     I reviewed the patient's new clinical results.  I reviewed the patient's other test results and agree with the interpretation  I personally viewed and interpreted the patient's EKG/Telemetry data  Discussed with pt and RN (no family present)    Results from last 7 days   Lab Units 09/06/20  0704 09/05/20 2039   WBC 10*3/mm3 7.49 8.25   HEMOGLOBIN g/dL 9.6* 10.6*   PLATELETS 10*3/mm3 220 264     Results from last 7 days   Lab Units 09/06/20  0704 09/05/20 2039   SODIUM mmol/L 131* 131*   POTASSIUM mmol/L 5.6* 6.4*   CHLORIDE mmol/L 106 99   CO2 mmol/L 17.2* 16.7*   BUN mg/dL 36* 45*   CREATININE mg/dL 2.13* 2.70*   CALCIUM mg/dL 8.1* 9.5   Estimated Creatinine Clearance: 23.3 mL/min (A) (by C-G formula based on SCr of 2.13 mg/dL (H)).    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)    Generalized weakness    Hyperkalemia    Current chronic use of systemic steroids    Failure to thrive in adult    Chronic anticoagulation          Plan:   (TIAGO/CKD3 with hyperK+  -most likely pre-renal, due to poor po intake  -Renal to see  -continue IVFs  -avoid hypotension  -daily wts and I/Os  -Cr improving  -K+ down to 5.6 after treatment in ER (D50/insulin and Kayexalate), no EKG changes    Failure to thrive, adult  -this is not a new issue, it has been mentioned at PCP office visits for over a year by my review of chart  -suspect this is why he is  on chronic steroid therapy  -suspect this is underlying reason for his current status    Constipation  -check KUB    Chronic steroid therapy  -pt cannot tell me why he takes Decadron and reason is not mentioned in any of his PCP office notes (though med is listed at each visit)  -?appetite stimulant  -not receiving here and BPs remain high    DM2 (A1c 9.0)  -hold OHG  -cover with SSI    PAD with bilateral iliac stents in July  -no complaints  -Eliquis    Anemia of chronic disease  -Hgb fairly stable since July    Full code  Eliquis should suffice for DVT ppx  Dispo: ?SNF  Further orders to follow as suggested by evolving hospital course).       Douglas Paredes MD  09/06/20  13:25    Time: 35min

## 2020-09-06 NOTE — ED NOTES
"Pt denies any complaints and states he is \"not really sure why I'm here.\" States \"my wife gets irritated at me because I don't walk the way she wants me to but I know which way I need to go.\" Pt denies any new weakness. States he walks with a walker or uses a wheelchair. Pt alert and oriented. No obvious neuro deficits noted.    Pt wearing mask. This RN wearing appropriate PPE, including mask and eye protection, during all pt care.       Myrna Roberson, RN  09/05/20 2026    "

## 2020-09-06 NOTE — ED PROVIDER NOTES
EMERGENCY DEPARTMENT ENCOUNTER    CHIEF COMPLAINT  Chief Complaint: Generalized weakness  History given by: Patient  History limited by: None  Room Number: JOSE RAUL/JOSE RAUL  PMD: Shantell Alvarez PA-C      HPI:  Pt is a 80 y.o. male who presents complaining of generalized weakness that he states is been present for at least 2 months but per his wife has been rapidly progressing over the past 2 days.  The patient states that now, he is essentially to the point where he can no longer walk because his legs are so weak.  The patient denies any discomfort such as chest pain, abdominal pain, back pain, nausea/vomiting/diarrhea, extremity pain, fever/chills, or any known sick contacts.  The patient states that his only symptom today is generalized weakness.  Symptoms are moderate in intensity and worsening.  Symptoms have been gradual in onset as well.  He has not taken any medication for this so there is been no treatment prior to arrival.  Nothing thus far is been found to alleviate or exacerbate the symptoms.      PAST MEDICAL HISTORY  Active Ambulatory Problems     Diagnosis Date Noted   • Anxiety disorder 12/18/2015   • Recurrent major depressive disorder, in full remission (CMS/[facility]) 12/18/2015   • Constipation 12/18/2015   • Diabetes type 2, uncontrolled (CMS/[facility]) 12/18/2015   • GERD (gastroesophageal reflux disease) 12/18/2015   • Hyperlipidemia 12/18/2015   • Low back pain 12/18/2015   • Prostatitis 12/18/2015   • Pulmonary nodule 12/18/2015   • Diabetic peripheral neuropathy (CMS/[facility]) 02/22/2016   • Chronic fatigue 02/22/2016   • Vitamin D deficiency 05/24/2016   • Noncompliance of patient with dietary regimen 05/24/2016   • Uncontrolled type 2 diabetes mellitus (CMS/[facility]) 10/04/2016   • Constipation 03/07/2017   • Leg pain 03/07/2017   • Benign essential HTN 07/27/2018   • Noncompliance with medications 07/27/2018   • Perirectal abscess 02/01/2019   • Anxiety 02/01/2019   • CKD (chronic kidney disease) stage 3, GFR 30-59  ml/min (CMS/[facility]) 07/29/2019   • Anemia of chronic disease 07/29/2019   • Low hemoglobin 07/29/2019   • PAD (peripheral artery disease) (CMS/[facility]) 07/29/2019   • Polyp of colon 07/29/2019   • Pyelonephritis 01/11/2020   • Hematuria 10/29/2019   • Tobacco use 08/29/2019   • Type 2 diabetes mellitus with diabetic chronic kidney disease (CMS/[facility]) 08/29/2019   • Contusion of rib on right side 02/11/2020   • Weight loss 02/28/2020     Resolved Ambulatory Problems     Diagnosis Date Noted   • No Resolved Ambulatory Problems     Past Medical History:   Diagnosis Date   • Type 2 diabetes mellitus (CMS/[facility])        PAST SURGICAL HISTORY  Past Surgical History:   Procedure Laterality Date   • BACK SURGERY     • CYSTOSCOPY W/ URETERAL STENT PLACEMENT Left 1/11/2020    Procedure: CYSTOSCOPY URETERAL CATHETER/STENT INSERTION;  Surgeon: Clark Ramirez MD;  Location: [facility];  Service: Urology   • KIDNEY STONE SURGERY     • URINARY SURGERY      stent placement       FAMILY HISTORY  History reviewed. No pertinent family history.    SOCIAL HISTORY  Social History     Socioeconomic History   • Marital status:      Spouse name: Not on file   • Number of children: Not on file   • Years of education: Not on file   • Highest education level: Not on file   Tobacco Use   • Smoking status: Current Every Day Smoker     Types: Cigars   • Smokeless tobacco: Never Used   • Tobacco comment: 1 cigar/day   Substance and Sexual Activity   • Alcohol use: No   • Drug use: No   • Sexual activity: Defer       ALLERGIES  Flexeril [cyclobenzaprine]    REVIEW OF SYSTEMS  Review of Systems   Constitutional: Negative for activity change, appetite change and fever.   HENT: Negative for congestion and sore throat.    Eyes: Negative.    Respiratory: Negative for cough and shortness of breath.    Cardiovascular: Negative for chest pain and leg swelling.   Gastrointestinal: Negative for abdominal pain, diarrhea and vomiting.   Endocrine:  Negative.    Genitourinary: Negative for decreased urine volume and dysuria.   Musculoskeletal: Positive for gait problem. Negative for neck pain.   Skin: Negative for rash and wound.   Allergic/Immunologic: Negative.    Neurological: Positive for weakness. Negative for numbness and headaches.   Hematological: Negative.    Psychiatric/Behavioral: Negative.    All other systems reviewed and are negative.      PHYSICAL EXAM  ED Triage Vitals   Temp Heart Rate Resp BP SpO2   09/05/20 2015 09/05/20 2017 09/05/20 2017 09/05/20 2017 09/05/20 2017   97.7 °F (36.5 °C) 72 18 133/72 97 %      Temp src Heart Rate Source Patient Position BP Location FiO2 (%)   09/05/20 2015 -- -- -- --   Tympanic           Physical Exam   Constitutional: He is oriented to person, place, and time. No distress.   Cachectic and ill-appearing   HENT:   Head: Normocephalic and atraumatic.   Eyes: Pupils are equal, round, and reactive to light. EOM are normal.   Neck: Normal range of motion. Neck supple.   Cardiovascular: Normal rate, regular rhythm and normal heart sounds.   Pulmonary/Chest: Effort normal and breath sounds normal. No respiratory distress.   Abdominal: Soft. There is no tenderness. There is no rebound and no guarding.   Musculoskeletal: Normal range of motion. He exhibits no edema.   Neurological: He is alert and oriented to person, place, and time. He has normal sensation and normal strength.   Skin: Skin is warm and dry.   Psychiatric: Mood and affect normal.   Nursing note and vitals reviewed.      LAB RESULTS  Lab Results (last 24 hours)     Procedure Component Value Units Date/Time    CBC & Differential [[id number]] Collected:  09/05/20 2039    Specimen:  Blood Updated:  09/05/20 2050    Narrative:       The following orders were created for panel order CBC & Differential.  Procedure                               Abnormality         Status                     ---------                               -----------         ------                      CBC Auto Differential[[id number]]        Abnormal            Final result                 Please view results for these tests on the individual orders.    Comprehensive Metabolic Panel [[id number]]  (Abnormal) Collected:  09/05/20 2039    Specimen:  Blood Updated:  09/05/20 2120     Glucose 235 mg/dL      BUN 45 mg/dL      Creatinine 2.70 mg/dL      Sodium 131 mmol/L      Potassium 6.4 mmol/L      Chloride 99 mmol/L      CO2 16.7 mmol/L      Calcium 9.5 mg/dL      Total Protein 6.3 g/dL      Albumin 4.30 g/dL      ALT (SGPT) 7 U/L      AST (SGOT) 12 U/L      Alkaline Phosphatase 63 U/L      Total Bilirubin 0.2 mg/dL      eGFR Non African Amer 23 mL/min/1.73      Globulin 2.0 gm/dL      A/G Ratio 2.2 g/dL      BUN/Creatinine Ratio 16.7     Anion Gap 15.3 mmol/L     Narrative:       GFR Normal >60  Chronic Kidney Disease <60  Kidney Failure <15      Protime-INR [[id number]]  (Normal) Collected:  09/05/20 2039    Specimen:  Blood Updated:  09/05/20 2104     Protime 13.9 Seconds      INR 1.09    aPTT [[id number]]  (Normal) Collected:  09/05/20 2039    Specimen:  Blood Updated:  09/05/20 2104     PTT 28.1 seconds     Troponin [[id number]]  (Normal) Collected:  09/05/20 2039    Specimen:  Blood Updated:  09/05/20 2113     Troponin T 0.014 ng/mL     Narrative:       Troponin T Reference Range:  <= 0.03 ng/mL-   Negative for AMI  >0.03 ng/mL-     Abnormal for myocardial necrosis.  Clinicians would have to utilize clinical acumen, EKG, Troponin and serial changes to determine if it is an Acute Myocardial Infarction or myocardial injury due to an underlying chronic condition.       Results may be falsely decreased if patient taking Biotin.      CBC Auto Differential [[id number]]  (Abnormal) Collected:  09/05/20 2039    Specimen:  Blood Updated:  09/05/20 2050     WBC 8.25 10*3/mm3      RBC 3.39 10*6/mm3      Hemoglobin 10.6 g/dL      Hematocrit 33.0 %      MCV 97.3 fL      MCH 31.3 pg      MCHC 32.1 g/dL      RDW  12.9 %      RDW-SD 45.5 fl      MPV 9.6 fL      Platelets 264 10*3/mm3      Neutrophil % 62.4 %      Lymphocyte % 30.5 %      Monocyte % 5.9 %      Eosinophil % 0.2 %      Basophil % 0.2 %      Immature Grans % 0.8 %      Neutrophils, Absolute 5.13 10*3/mm3      Lymphocytes, Absolute 2.52 10*3/mm3      Monocytes, Absolute 0.49 10*3/mm3      Eosinophils, Absolute 0.02 10*3/mm3      Basophils, Absolute 0.02 10*3/mm3      Immature Grans, Absolute 0.07 10*3/mm3      nRBC 0.0 /100 WBC     Urinalysis With Microscopic If Indicated (No Culture) - Urine, Clean Catch [[id number]]  (Abnormal) Collected:  09/05/20 2100    Specimen:  Urine, Clean Catch Updated:  09/05/20 2112     Color, UA Yellow     Appearance, UA Clear     pH, UA >=9.0     Specific Gravity, UA 1.017     Glucose,  mg/dL (Trace)     Ketones, UA Negative     Bilirubin, UA Negative     Blood, UA Negative     Protein, UA 30 mg/dL (1+)     Leuk Esterase, UA Negative     Nitrite, UA Negative     Urobilinogen, UA 0.2 E.U./dL    Urinalysis, Microscopic Only - Urine, Clean Catch [[id number]] Collected:  09/05/20 2100    Specimen:  Urine, Clean Catch Updated:  09/05/20 2112     RBC, UA 0-2 /HPF      WBC, UA 0-2 /HPF      Bacteria, UA None Seen /HPF      Squamous Epithelial Cells, UA 0-2 /HPF      Hyaline Casts, UA 0-2 /LPF      Methodology Automated Microscopy    COVID PRE-OP / PRE-PROCEDURE SCREENING ORDER (NO ISOLATION) - Swab, Nasopharynx [[id number]] Collected:  09/05/20 2208    Specimen:  Swab from Nasopharynx Updated:  09/05/20 2221    Narrative:       The following orders were created for panel order COVID PRE-OP / PRE-PROCEDURE SCREENING ORDER (NO ISOLATION) - Swab, Nasopharynx.  Procedure                               Abnormality         Status                     ---------                               -----------         ------                     COVID-19,BIOTAP, NP/OP S...[[id number]]                      In process                   Please view results  for these tests on the individual orders.    COVID-19,BIOTAP, NP/OP SWAB IN TRANSPORT MEDIA OR SALINE 24-36 HR TAT - Swab, Nasopharynx [[id number]] Collected:  09/05/20 2208    Specimen:  Swab from Nasopharynx Updated:  09/05/20 2221          I ordered the above labs and reviewed the results    RADIOLOGY  CT Head Without Contrast   Final Result   No acute findings.       Radiation dose reduction techniques were utilized, including automated   exposure control and exposure modulation based on body size.       This report was finalized on 9/5/2020 10:42 PM by Dr. Concetta Cardenas M.D.          XR Chest 1 View    (Results Pending)        I ordered the above noted radiological studies. Interpreted by radiologist.  Reviewed by me in PACS.       PROCEDURES  Procedures  EKG          EKG time: 2024  Rhythm/Rate: NSR, 76  P waves and MS: nml  QRS, axis: nml, LAD   ST and T waves: nml     Interpreted Contemporaneously by me, independently viewed  changed compared to prior 1/11/20      PROGRESS AND CONSULTS     The patient was wearing a facemask upon entrance into the room and remained in such throughout their visit.  I was wearing PPE including a facemask, eye protection, as well as gloves at any point entering the room and throughout the visit    2130  It appears as though based on his blood work that the patient is having acute renal insufficiency with fairly significant hyperkalemia.  We will continue to aggressively fluid hydrate and I will give the patient insulin, dextrose, Kayexalate to treat his hyperkalemia.  We will reassess.    2200  Case discussed with RUSSELL High for [facility], who agrees to admit the patient to the hospital for Dr. Orellana      MEDICAL DECISION MAKING  Results were reviewed/discussed with the patient and they were also made aware of online access. Pt also made aware that some labs, such as cultures, will not be resulted during ER visit and follow up with PMD is necessary.     MDM  Number of  Diagnoses or Management Options  Acute renal insufficiency:   Generalized weakness:   Hyperkalemia:      Amount and/or Complexity of Data Reviewed  Clinical lab tests: ordered and reviewed  Tests in the radiology section of CPT®: ordered and reviewed  Tests in the medicine section of CPT®: ordered and reviewed  Review and summarize past medical records: yes (Upon medical records review, the patient was last seen and evaluated on 7/1/2020 where he was admitted secondary to peripheral vascular disease as well as hyperkalemia.)  Discuss the patient with other providers: yes (RUSSELL High for [facility], who agrees to admit the patient to the hospital for Dr. Orellana)  Independent visualization of images, tracings, or specimens: yes (CT scan of the head unremarkable for any acute abnormality)           DIAGNOSIS  Final diagnoses:   Generalized weakness   Acute renal insufficiency   Hyperkalemia       DISPOSITION  ADMISSION    Discussed treatment plan and reason for admission with pt/family and admitting physician.  Pt/family voiced understanding of the plan for admission for further testing/treatment as needed.         Latest Documented Vital Signs:  As of 23:16  BP- (!) 210/89 HR- 79 Temp- 98.3 °F (36.8 °C) (Temporal) O2 sat- 100%         Asad Reid MD  09/05/20 7605

## 2020-09-07 LAB
ANION GAP SERPL CALCULATED.3IONS-SCNC: 8.1 MMOL/L (ref 5–15)
BUN SERPL-MCNC: 29 MG/DL (ref 8–23)
BUN/CREAT SERPL: 15.8 (ref 7–25)
CALCIUM SPEC-SCNC: 7.8 MG/DL (ref 8.6–10.5)
CHLORIDE SERPL-SCNC: 106 MMOL/L (ref 98–107)
CO2 SERPL-SCNC: 18.9 MMOL/L (ref 22–29)
CREAT SERPL-MCNC: 1.83 MG/DL (ref 0.76–1.27)
DEPRECATED RDW RBC AUTO: 45 FL (ref 37–54)
ERYTHROCYTE [DISTWIDTH] IN BLOOD BY AUTOMATED COUNT: 13.1 % (ref 12.3–15.4)
GFR SERPL CREATININE-BSD FRML MDRD: 36 ML/MIN/1.73
GLUCOSE BLDC GLUCOMTR-MCNC: 182 MG/DL (ref 70–130)
GLUCOSE BLDC GLUCOMTR-MCNC: 209 MG/DL (ref 70–130)
GLUCOSE BLDC GLUCOMTR-MCNC: 242 MG/DL (ref 70–130)
GLUCOSE BLDC GLUCOMTR-MCNC: 262 MG/DL (ref 70–130)
GLUCOSE SERPL-MCNC: 190 MG/DL (ref 65–99)
HCT VFR BLD AUTO: 29.4 % (ref 37.5–51)
HGB BLD-MCNC: 9.8 G/DL (ref 13–17.7)
MAGNESIUM SERPL-MCNC: 2 MG/DL (ref 1.6–2.4)
MCH RBC QN AUTO: 31.6 PG (ref 26.6–33)
MCHC RBC AUTO-ENTMCNC: 33.3 G/DL (ref 31.5–35.7)
MCV RBC AUTO: 94.8 FL (ref 79–97)
PLATELET # BLD AUTO: 210 10*3/MM3 (ref 140–450)
PMV BLD AUTO: 9.5 FL (ref 6–12)
POTASSIUM SERPL-SCNC: 4.2 MMOL/L (ref 3.5–5.2)
RBC # BLD AUTO: 3.1 10*6/MM3 (ref 4.14–5.8)
SARS-COV-2 RNA RESP QL NAA+PROBE: NOT DETECTED
SODIUM SERPL-SCNC: 133 MMOL/L (ref 136–145)
WBC # BLD AUTO: 6.91 10*3/MM3 (ref 3.4–10.8)

## 2020-09-07 PROCEDURE — 85027 COMPLETE CBC AUTOMATED: CPT | Performed by: HOSPITALIST

## 2020-09-07 PROCEDURE — 80048 BASIC METABOLIC PNL TOTAL CA: CPT | Performed by: INTERNAL MEDICINE

## 2020-09-07 PROCEDURE — 63710000001 INSULIN LISPRO (HUMAN) PER 5 UNITS: Performed by: NURSE PRACTITIONER

## 2020-09-07 PROCEDURE — 83735 ASSAY OF MAGNESIUM: CPT | Performed by: HOSPITALIST

## 2020-09-07 PROCEDURE — 97110 THERAPEUTIC EXERCISES: CPT

## 2020-09-07 PROCEDURE — 82962 GLUCOSE BLOOD TEST: CPT

## 2020-09-07 PROCEDURE — 63710000001 INSULIN GLARGINE PER 5 UNITS: Performed by: HOSPITALIST

## 2020-09-07 RX ORDER — INSULIN GLARGINE 100 [IU]/ML
5 INJECTION, SOLUTION SUBCUTANEOUS NIGHTLY
Status: DISCONTINUED | OUTPATIENT
Start: 2020-09-07 | End: 2020-09-08

## 2020-09-07 RX ADMIN — APIXABAN 2.5 MG: 2.5 TABLET, FILM COATED ORAL at 21:41

## 2020-09-07 RX ADMIN — POLYETHYLENE GLYCOL 3350 17 G: 17 POWDER, FOR SOLUTION ORAL at 08:24

## 2020-09-07 RX ADMIN — INSULIN LISPRO 6 UNITS: 100 INJECTION, SOLUTION INTRAVENOUS; SUBCUTANEOUS at 12:29

## 2020-09-07 RX ADMIN — INSULIN LISPRO 4 UNITS: 100 INJECTION, SOLUTION INTRAVENOUS; SUBCUTANEOUS at 17:18

## 2020-09-07 RX ADMIN — HYDRALAZINE HYDROCHLORIDE 25 MG: 25 TABLET, FILM COATED ORAL at 12:30

## 2020-09-07 RX ADMIN — HYDRALAZINE HYDROCHLORIDE 25 MG: 25 TABLET, FILM COATED ORAL at 21:42

## 2020-09-07 RX ADMIN — HYDRALAZINE HYDROCHLORIDE 25 MG: 25 TABLET, FILM COATED ORAL at 06:31

## 2020-09-07 RX ADMIN — INSULIN LISPRO 2 UNITS: 100 INJECTION, SOLUTION INTRAVENOUS; SUBCUTANEOUS at 08:16

## 2020-09-07 RX ADMIN — DOCUSATE SODIUM 50MG AND SENNOSIDES 8.6MG 1 TABLET: 8.6; 5 TABLET, FILM COATED ORAL at 21:41

## 2020-09-07 RX ADMIN — APIXABAN 2.5 MG: 2.5 TABLET, FILM COATED ORAL at 08:16

## 2020-09-07 RX ADMIN — SODIUM BICARBONATE: 84 INJECTION, SOLUTION INTRAVENOUS at 01:33

## 2020-09-07 RX ADMIN — SODIUM BICARBONATE: 84 INJECTION, SOLUTION INTRAVENOUS at 08:24

## 2020-09-07 RX ADMIN — INSULIN GLARGINE 5 UNITS: 100 INJECTION, SOLUTION SUBCUTANEOUS at 21:41

## 2020-09-07 RX ADMIN — DEXAMETHASONE 2 MG: 2 TABLET ORAL at 08:16

## 2020-09-07 RX ADMIN — SODIUM BICARBONATE: 84 INJECTION, SOLUTION INTRAVENOUS at 16:25

## 2020-09-07 NOTE — PROGRESS NOTES
LOS: 2 days   Patient Care Team:  Shantell Alvarez PA-C as PCP - General (Family Medicine)  Asia Hilton APRN as Nurse Practitioner (Endocrinology)  Aureliano Linares MD as Consulting Physician (Urology)  Osmin Fan MD as Consulting Physician (Nephrology)  Vignesh Babin MD as Consulting Physician (Gastroenterology)    Chief Complaint: acute renal filure    Subjective     History of Present Illness    Subjective    Patient is a 80 year old male with history of ckd III followed by Dr Fan. baseilne cr 1.2. Admitted with weakness found to have hyperkalemia and acute renal failure. Currently without complaints.      Objective     Vital Signs  Temp:  [98.7 °F (37.1 °C)-99 °F (37.2 °C)] 98.7 °F (37.1 °C)  Heart Rate:  [72-81] 72  Resp:  [18-20] 18  BP: (171-180)/(83-89) 179/88    Objective  General Appearance:  In no acute distress.    HEENT: Normal HEENT exam.     Extremities: .  There is no deformity, effusion or dependent edema.    Neurological: Patient is alert and oriented to person, place and time.    Skin:  Warm and dry.  No rash.       Results Review:     I reviewed the patient's new clinical results.    Medication Review:   Scheduled Meds:  apixaban 2.5 mg Oral Q12H   dexamethasone 2 mg Oral Daily With Breakfast   hydrALAZINE 25 mg Oral Q8H   insulin lispro 0-9 Units Subcutaneous TID AC   polyethylene glycol 17 g Oral Daily   senna-docusate sodium 1 tablet Oral Nightly     Continuous Infusions:  custom IV infusion builder  Last Rate: 125 mL/hr at 09/07/20 0824     PRN Meds:.•  acetaminophen **OR** acetaminophen **OR** acetaminophen  •  dextrose  •  dextrose  •  glucagon (human recombinant)  •  nitroglycerin  •  ondansetron    Assessment/Plan       TIAGO (acute kidney injury) (CMS/[facility])    Hyperlipidemia    Diabetic peripheral neuropathy (CMS/[facility])    Chronic fatigue    Vitamin D deficiency    Constipation    Benign essential HTN    CKD (chronic kidney disease) stage 3, GFR 30-59 ml/min (CMS/[facility])     Anemia of chronic disease    Tobacco use    Type 2 diabetes mellitus with diabetic chronic kidney disease (CMS/[facility])    Generalized weakness    Hyperkalemia    Current chronic use of systemic steroids    Failure to thrive in adult    Chronic anticoagulation      Assessment & Plan  1. ton  2. ckd III  3. Hyperkalemia  4. T2DM  5. Hypertension  6. Metabolic acidosis    Patient seen and examined labs reviewed. Renal function improving with current ivf.  Acidosis improving as well. Will continue current and f/u in am    Margi Croft MD  09/07/20  09:46

## 2020-09-07 NOTE — THERAPY TREATMENT NOTE
Patient Name: Courtney Medrano  : 1940    MRN: [id number]                              Today's Date: 2020       Admit Date: 2020    Visit Dx:     ICD-10-CM ICD-9-CM   1. Generalized weakness R53.1 780.79   2. Acute renal insufficiency N28.9 593.9   3. Hyperkalemia E87.5 276.7     Patient Active Problem List   Diagnosis   • Anxiety disorder   • Recurrent major depressive disorder, in full remission (CMS/[facility])   • Constipation   • Diabetes type 2, uncontrolled (CMS/[facility])   • GERD (gastroesophageal reflux disease)   • Hyperlipidemia   • Low back pain   • Prostatitis   • Pulmonary nodule   • Diabetic peripheral neuropathy (CMS/[facility])   • Chronic fatigue   • Vitamin D deficiency   • Noncompliance of patient with dietary regimen   • Uncontrolled type 2 diabetes mellitus (CMS/[facility])   • Constipation   • Leg pain   • Benign essential HTN   • Noncompliance with medications   • Perirectal abscess   • Anxiety   • CKD (chronic kidney disease) stage 3, GFR 30-59 ml/min (CMS/[facility])   • Anemia of chronic disease   • Low hemoglobin   • PAD (peripheral artery disease) (CMS/[facility])   • Polyp of colon   • Pyelonephritis   • Hematuria   • Tobacco use   • Type 2 diabetes mellitus with diabetic chronic kidney disease (CMS/[facility])   • Contusion of rib on right side   • Weight loss   • Generalized weakness   • Hyperkalemia   • TIAGO (acute kidney injury) (CMS/[facility])   • Current chronic use of systemic steroids   • Failure to thrive in adult   • Chronic anticoagulation     Past Medical History:   Diagnosis Date   • Type 2 diabetes mellitus (CMS/[facility])    • Vitamin D deficiency      Past Surgical History:   Procedure Laterality Date   • BACK SURGERY     • CYSTOSCOPY W/ URETERAL STENT PLACEMENT Left 2020    Procedure: CYSTOSCOPY URETERAL CATHETER/STENT INSERTION;  Surgeon: Clark Raimrez MD;  Location: [facility];  Service: Urology   • KIDNEY STONE SURGERY     • URINARY SURGERY      stent placement     General Information     Row  Name 09/07/20 1506          PT Evaluation Time/Intention    Document Type  therapy note (daily note)  -     Mode of Treatment  physical therapy  -     Row Name 09/07/20 1506          General Information    Existing Precautions/Restrictions  fall  -     Row Name 09/07/20 1506          Cognitive Assessment/Intervention- PT/OT    Orientation Status (Cognition)  oriented x 3  -       User Key  (r) = Recorded By, (t) = Taken By, (c) = Cosigned By    Initials Name Provider Type     Charlene Reid PTA Physical Therapy Assistant        Mobility     Row Name 09/07/20 1507          Bed Mobility Assessment/Treatment    Bed Mobility Assessment/Treatment  supine-sit;sit-supine  -     Supine-Sit Rowan (Bed Mobility)  supervision  -     Sit-Supine Rowan (Bed Mobility)  supervision  -     Assistive Device (Bed Mobility)  bed rails;head of bed elevated  -     Row Name 09/07/20 1507          Sit-Stand Transfer    Sit-Stand Rowan (Transfers)  stand by assist  -     Assistive Device (Sit-Stand Transfers)  walker, front-wheeled  -     Row Name 09/07/20 1507          Gait/Stairs Assessment/Training    Rowan Level (Gait)  contact guard  -     Assistive Device (Gait)  walker, front-wheeled  -     Distance in Feet (Gait)  60  -     Pattern (Gait)  step-through  -     Deviations/Abnormal Patterns (Gait)  demian decreased;stride length decreased  -     Bilateral Gait Deviations  forward flexed posture  -     Comment (Gait/Stairs)  cues to keep feet inside of walker  -       User Key  (r) = Recorded By, (t) = Taken By, (c) = Cosigned By    Initials Name Provider Type     Charlene Reid PTA Physical Therapy Assistant        Obj/Interventions    No documentation.       Goals/Plan    No documentation.       Clinical Impression     Row Name 09/07/20 1507          Pain Assessment    Additional Documentation  Pain Scale: Numbers Pre/Post-Treatment (Group)  -     Row  Name 09/07/20 1507          Pain Scale: Numbers Pre/Post-Treatment    Pain Scale: Numbers, Pretreatment  0/10 - no pain  -SM     Pain Scale: Numbers, Post-Treatment  0/10 - no pain  -SM     Row Name 09/07/20 1507          Positioning and Restraints    Pre-Treatment Position  in bed  -SM     Post Treatment Position  bed  -SM     In Bed  supine;call light within reach;encouraged to call for assist;exit alarm on;notified nsg  -SM       User Key  (r) = Recorded By, (t) = Taken By, (c) = Cosigned By    Initials Name Provider Type    Charlene Coronel PTA Physical Therapy Assistant        Outcome Measures     Row Name 09/07/20 1510          How much help from another person do you currently need...    Turning from your back to your side while in flat bed without using bedrails?  3  -SM     Moving from lying on back to sitting on the side of a flat bed without bedrails?  3  -SM     Moving to and from a bed to a chair (including a wheelchair)?  3  -SM     Standing up from a chair using your arms (e.g., wheelchair, bedside chair)?  3  -SM     Climbing 3-5 steps with a railing?  2  -SM     To walk in hospital room?  3  -SM     AM-PAC 6 Clicks Score (PT)  17  -SM     Row Name 09/07/20 1510          Functional Assessment    Outcome Measure Options  AM-PAC 6 Clicks Basic Mobility (PT)  -       User Key  (r) = Recorded By, (t) = Taken By, (c) = Cosigned By    Initials Name Provider Type    Charlene Coronel PTA Physical Therapy Assistant        Physical Therapy Education                 Title: PT OT SLP Therapies (Done)     Topic: Physical Therapy (Done)     Point: Mobility training (Done)     Description:   Instruct learner(s) on safety and technique for assisting patient out of bed, chair or wheelchair.  Instruct in the proper use of assistive devices, such as walker, crutches, cane or brace.              Patient Friendly Description:   It's important to get you on your feet again, but we need to do so in a way  that is safe for you. Falling has serious consequences, and your personal safety is the most important thing of all.        When it's time to get out of bed, one of us or a family member will sit next to you on the bed to give you support.     If your doctor or nurse tells you to use a walker, crutches, a cane, or a brace, be sure you use it every time you get out of bed, even if you think you don't need it.    Learning Progress Summary           Patient Acceptance, E,TB,D, VU,NR by  at 9/7/2020 1510    Acceptance, E, NR by  at 9/6/2020 1604    Acceptance, E, VU by AS at 9/6/2020 0451                   Point: Home exercise program (Done)     Description:   Instruct learner(s) on appropriate technique for monitoring, assisting and/or progressing patient with therapeutic exercises and activities.              Learning Progress Summary           Patient Acceptance, E,TB,D, VU,NR by  at 9/7/2020 1510    Acceptance, E, VU by AS at 9/6/2020 0451                   Point: Body mechanics (Done)     Description:   Instruct learner(s) on proper positioning and spine alignment for patient and/or caregiver during mobility tasks and/or exercises.              Learning Progress Summary           Patient Acceptance, E,TB,D, VU,NR by  at 9/7/2020 1510    Acceptance, E, VU by AS at 9/6/2020 0451                   Point: Precautions (Done)     Description:   Instruct learner(s) on prescribed precautions during mobility and gait tasks              Learning Progress Summary           Patient Acceptance, E,TB,D, VU,NR by  at 9/7/2020 1510    Acceptance, E, VU by AS at 9/6/2020 0451                               User Key     Initials Effective Dates Name Provider Type Discipline     04/03/18 -  Brenna Slater PT Physical Therapist PT     03/07/18 -  Charlene Reid PTA Physical Therapy Assistant PT    AS 08/10/20 -  Selwyn Lopez, RN Registered Nurse Nurse              PT Recommendation and Plan     Outcome  Summary/Treatment Plan (PT)  Anticipated Discharge Disposition (PT): home with home health, skilled nursing facility  Plan of Care Reviewed With: patient  Progress: improving  Outcome Summary: Pt tolerated treatment well this date. Did much better overall. Increased gait distance to 60ft w/ Rw and CGA. Only required SV for bed mobility. No LOBs noted, though pt needed cues to keep feet inside of walker. RN notified of pt's progress today. Patient was wearing a face mask during this therapy encounter. Therapist used appropriate personal protective equipment including eye protection, mask, and gloves.  Mask used was standard procedure mask. Appropriate PPE was worn during the entire therapy session. Hand hygiene was completed before and after therapy session. Patient is not in enhanced droplet precautions.     Time Calculation:   PT Charges     Row Name 09/07/20 1512             Time Calculation    Start Time  1434  -      Stop Time  1458  -      Time Calculation (min)  24 min  -      PT Received On  09/07/20  -      PT - Next Appointment  09/08/20  -        User Key  (r) = Recorded By, (t) = Taken By, (c) = Cosigned By    Initials Name Provider Type    Charlene Coronel PTA Physical Therapy Assistant        Therapy Charges for Today     Code Description Service Date Service Provider Modifiers Qty    [id number] HC PT THER PROC EA 15 MIN 9/7/2020 Charlene Reid PTA GP 2          PT G-Codes  Outcome Measure Options: AM-PAC 6 Clicks Basic Mobility (PT)  AM-PAC 6 Clicks Score (PT): 17    Charlene Reid PTA  9/7/2020

## 2020-09-07 NOTE — PLAN OF CARE
Problem: Patient Care Overview  Goal: Plan of Care Review  Flowsheets (Taken 9/7/2020 7333)  Progress: no change  Plan of Care Reviewed With: patient  Outcome Summary: Patient continues to need reinfrocemtn to turn frequently. Patient has asked to eat multiples times this eveing. Patient still has not had a bowel movement. Incontinent of urine. Patient sBP's have remained stable this evening. Will continue to monitor.

## 2020-09-07 NOTE — PLAN OF CARE
Problem: Patient Care Overview  Goal: Plan of Care Review  Outcome: Ongoing (interventions implemented as appropriate)  Flowsheets (Taken 9/7/2020 1510)  Progress: improving  Plan of Care Reviewed With: patient  Outcome Summary: Pt tolerated treatment well this date. Did much better overall. Increased gait distance to 60ft w/ Rw and CGA. Only required SV for bed mobility. No LOBs noted, though pt needed cues to keep feet inside of walker. RN notified of pt's progress today. Patient was wearing a face mask during this therapy encounter. Therapist used appropriate personal protective equipment including eye protection, mask, and gloves.  Mask used was standard procedure mask. Appropriate PPE was worn during the entire therapy session. Hand hygiene was completed before and after therapy session. Patient is not in enhanced droplet precautions.

## 2020-09-07 NOTE — PROGRESS NOTES
"   LOS: 2 days   Patient Care Team:  Shantell Alvarez PA-C as PCP - General (Family Medicine)  Asia Hilton APRN as Nurse Practitioner (Endocrinology)  Aureliano Linares MD as Consulting Physician (Urology)  Osmin Fan MD as Consulting Physician (Nephrology)  Vignesh Babin MD as Consulting Physician (Gastroenterology)    Chief Complaint: tired    Subjective     Pt feeling better than yesterday. Denies SOA or cough. No chest pain. No N/V. No abd pain. Eating better. Voiding okay. No BM.      Subjective:  Symptoms:  Improved.  He reports malaise, weakness and anorexia.  No shortness of breath, cough, chest pain, headache, chest pressure, diarrhea or anxiety.    Diet:  Adequate intake.  No nausea or vomiting.    Activity level: Impaired due to weakness.    Pain:  He reports no pain.        History taken from: patient chart RN    Objective     Vital Signs  Temp:  [98.6 °F (37 °C)-98.7 °F (37.1 °C)] 98.6 °F (37 °C)  Heart Rate:  [72-82] 82  Resp:  [16-18] 16  BP: (151-179)/(82-88) 151/85  I/O last 3 completed shifts:  In: 2086 [P.O.:500; I.V.:586; IV Piggyback:1000]  Out: -   I/O this shift:  In: 2940 [P.O.:960; I.V.:1980]  Out: -         Objective:  General Appearance:  Comfortable, in no acute distress and ill-appearing (cachectic).    Vital signs: (most recent): Blood pressure 151/85, pulse 82, temperature 98.6 °F (37 °C), temperature source Oral, resp. rate 16, height 170.2 cm (67\"), weight 59.3 kg (130 lb 11.2 oz), SpO2 97 %.  Vital signs are normal.  No fever.  (BPs high).    Output: Producing urine and no stool output.    HEENT: Normal HEENT exam.    Lungs:  Normal effort and normal respiratory rate.  Breath sounds clear to auscultation.  He is not in respiratory distress.    Heart: Normal rate.  Regular rhythm.    Abdomen: Abdomen is soft.  Bowel sounds are normal.   There is no abdominal tenderness.     Extremities: There is no dependent edema.    Pulses: Distal pulses are intact.    Neurological: " Patient is alert and oriented to person, place and time.  Patient has normal muscle tone.  (General weakness).    Pupils:  Pupils are equal, round, and reactive to light.    Skin:  Warm, dry and pale.  No rash.             Results Review:     I reviewed the patient's new clinical results.  I reviewed the patient's new imaging results and agree with the interpretation.  I reviewed the patient's other test results and agree with the interpretation  I personally viewed and interpreted the patient's EKG/Telemetry data  Discussed with pt and RN    Results from last 7 days   Lab Units 09/07/20  0611 09/06/20  0704 09/05/20 2039   WBC 10*3/mm3 6.91 7.49 8.25   HEMOGLOBIN g/dL 9.8* 9.6* 10.6*   PLATELETS 10*3/mm3 210 220 264     Results from last 7 days   Lab Units 09/07/20  0611 09/06/20  1427 09/06/20  0704 09/05/20 2039   SODIUM mmol/L 133* 130* 131* 131*   POTASSIUM mmol/L 4.2 5.6* 5.6* 6.4*   CHLORIDE mmol/L 106 105 106 99   CO2 mmol/L 18.9* 16.7* 17.2* 16.7*   BUN mg/dL 29* 35* 36* 45*   CREATININE mg/dL 1.83* 2.23* 2.13* 2.70*   CALCIUM mg/dL 7.8* 8.2* 8.1* 9.5   MAGNESIUM mg/dL 2.0  --   --   --    Estimated Creatinine Clearance: 27.9 mL/min (A) (by C-G formula based on SCr of 1.83 mg/dL (H)).    Medication Review: reviewed and adjusted    Assessment/Plan       TIAGO (acute kidney injury) (CMS/[facility])    Hyperlipidemia    Diabetic peripheral neuropathy (CMS/[facility])    Chronic fatigue    Vitamin D deficiency    Constipation    Benign essential HTN    CKD (chronic kidney disease) stage 3, GFR 30-59 ml/min (CMS/[facility])    Anemia of chronic disease    Tobacco use    Type 2 diabetes mellitus with diabetic chronic kidney disease (CMS/[facility])    Generalized weakness    Hyperkalemia    Current chronic use of systemic steroids    Failure to thrive in adult    Chronic anticoagulation          Plan:   (TIAGO/CKD3 with hyperK+ and metabolic acidosis  -most likely pre-renal, due to poor po intake  -Renal input appreciated  -continue IVFs with  bicarb  -avoid hypotension  -Cr improving today  -K+ down to 5.6 after treatment in ER (D50/insulin and Kayexalate), no EKG changes, normalized today    Failure to thrive, adult  -this is not a new issue, it has been mentioned at PCP office visits for over a year by my review of chart  -suspect this is why he is on chronic steroid therapy  -suspect this is underlying reason for his current status    Constipation  -checked KUB which revealed moderate amount of stool  -started bowel regimen    Chronic steroid therapy  -pt cannot tell me why he takes Decadron and reason is not mentioned in any of his PCP office notes (though med is listed at each visit)  -?appetite stimulant--son thinks that was the reason    DM2 (A1c 9.0)  -hold OHG  -covering with SSI  -sugars up today, will restart long-acting insulin at attenuated dose, adjust as needed    PAD with bilateral iliac stents in July  -no complaints  -Eliquis    Anemia of chronic disease  -Hgb fairly stable since July    Full code  Eliquis should suffice for DVT ppx  Dispo: ?SNF).       Douglas Paredes MD  09/07/20  17:07    Time: 30min

## 2020-09-08 ENCOUNTER — APPOINTMENT (OUTPATIENT)
Dept: ULTRASOUND IMAGING | Facility: HOSPITAL | Age: 80
End: 2020-09-08

## 2020-09-08 LAB
ANION GAP SERPL CALCULATED.3IONS-SCNC: 9.7 MMOL/L (ref 5–15)
BUN SERPL-MCNC: 32 MG/DL (ref 8–23)
BUN/CREAT SERPL: 15.9 (ref 7–25)
CALCIUM SPEC-SCNC: 8 MG/DL (ref 8.6–10.5)
CHLORIDE SERPL-SCNC: 107 MMOL/L (ref 98–107)
CHLORIDE UR-SCNC: 62 MMOL/L
CO2 SERPL-SCNC: 19.3 MMOL/L (ref 22–29)
CREAT SERPL-MCNC: 2.01 MG/DL (ref 0.76–1.27)
CREAT UR-MCNC: 24.1 MG/DL
DEPRECATED RDW RBC AUTO: 48.5 FL (ref 37–54)
EOSINOPHIL SPEC QL MICRO: 0 % EOS/100 CELLS (ref 0–0)
ERYTHROCYTE [DISTWIDTH] IN BLOOD BY AUTOMATED COUNT: 12.9 % (ref 12.3–15.4)
GFR SERPL CREATININE-BSD FRML MDRD: 32 ML/MIN/1.73
GLUCOSE BLDC GLUCOMTR-MCNC: 179 MG/DL (ref 70–130)
GLUCOSE BLDC GLUCOMTR-MCNC: 282 MG/DL (ref 70–130)
GLUCOSE BLDC GLUCOMTR-MCNC: 311 MG/DL (ref 70–130)
GLUCOSE BLDC GLUCOMTR-MCNC: 350 MG/DL (ref 70–130)
GLUCOSE SERPL-MCNC: 189 MG/DL (ref 65–99)
HCT VFR BLD AUTO: 33.3 % (ref 37.5–51)
HGB BLD-MCNC: 10.2 G/DL (ref 13–17.7)
MCH RBC QN AUTO: 31.7 PG (ref 26.6–33)
MCHC RBC AUTO-ENTMCNC: 30.6 G/DL (ref 31.5–35.7)
MCV RBC AUTO: 103.4 FL (ref 79–97)
PLATELET # BLD AUTO: 199 10*3/MM3 (ref 140–450)
PMV BLD AUTO: 9.9 FL (ref 6–12)
POTASSIUM SERPL-SCNC: 3.7 MMOL/L (ref 3.5–5.2)
RBC # BLD AUTO: 3.22 10*6/MM3 (ref 4.14–5.8)
SODIUM SERPL-SCNC: 136 MMOL/L (ref 136–145)
SODIUM UR-SCNC: 101 MMOL/L
WBC # BLD AUTO: 6.44 10*3/MM3 (ref 3.4–10.8)

## 2020-09-08 PROCEDURE — 84300 ASSAY OF URINE SODIUM: CPT | Performed by: INTERNAL MEDICINE

## 2020-09-08 PROCEDURE — 82436 ASSAY OF URINE CHLORIDE: CPT | Performed by: INTERNAL MEDICINE

## 2020-09-08 PROCEDURE — 80048 BASIC METABOLIC PNL TOTAL CA: CPT | Performed by: INTERNAL MEDICINE

## 2020-09-08 PROCEDURE — 76775 US EXAM ABDO BACK WALL LIM: CPT

## 2020-09-08 PROCEDURE — 82962 GLUCOSE BLOOD TEST: CPT

## 2020-09-08 PROCEDURE — 85027 COMPLETE CBC AUTOMATED: CPT | Performed by: HOSPITALIST

## 2020-09-08 PROCEDURE — 82570 ASSAY OF URINE CREATININE: CPT | Performed by: INTERNAL MEDICINE

## 2020-09-08 PROCEDURE — 87205 SMEAR GRAM STAIN: CPT | Performed by: INTERNAL MEDICINE

## 2020-09-08 PROCEDURE — 63710000001 INSULIN LISPRO (HUMAN) PER 5 UNITS: Performed by: NURSE PRACTITIONER

## 2020-09-08 PROCEDURE — 63710000001 INSULIN GLARGINE PER 5 UNITS: Performed by: HOSPITALIST

## 2020-09-08 PROCEDURE — 97110 THERAPEUTIC EXERCISES: CPT

## 2020-09-08 RX ORDER — INSULIN GLARGINE 100 [IU]/ML
10 INJECTION, SOLUTION SUBCUTANEOUS NIGHTLY
Status: DISCONTINUED | OUTPATIENT
Start: 2020-09-08 | End: 2020-09-11 | Stop reason: HOSPADM

## 2020-09-08 RX ORDER — SODIUM CHLORIDE, SODIUM LACTATE, POTASSIUM CHLORIDE, CALCIUM CHLORIDE 600; 310; 30; 20 MG/100ML; MG/100ML; MG/100ML; MG/100ML
50 INJECTION, SOLUTION INTRAVENOUS CONTINUOUS
Status: DISCONTINUED | OUTPATIENT
Start: 2020-09-08 | End: 2020-09-09

## 2020-09-08 RX ORDER — AMLODIPINE BESYLATE 5 MG/1
5 TABLET ORAL
Status: DISCONTINUED | OUTPATIENT
Start: 2020-09-08 | End: 2020-09-10

## 2020-09-08 RX ADMIN — APIXABAN 2.5 MG: 2.5 TABLET, FILM COATED ORAL at 08:13

## 2020-09-08 RX ADMIN — SODIUM CHLORIDE, POTASSIUM CHLORIDE, SODIUM LACTATE AND CALCIUM CHLORIDE 50 ML/HR: 600; 310; 30; 20 INJECTION, SOLUTION INTRAVENOUS at 12:21

## 2020-09-08 RX ADMIN — AMLODIPINE BESYLATE 5 MG: 5 TABLET ORAL at 14:11

## 2020-09-08 RX ADMIN — INSULIN LISPRO 2 UNITS: 100 INJECTION, SOLUTION INTRAVENOUS; SUBCUTANEOUS at 08:12

## 2020-09-08 RX ADMIN — DOCUSATE SODIUM 50MG AND SENNOSIDES 8.6MG 1 TABLET: 8.6; 5 TABLET, FILM COATED ORAL at 22:26

## 2020-09-08 RX ADMIN — DEXAMETHASONE 2 MG: 2 TABLET ORAL at 08:13

## 2020-09-08 RX ADMIN — SODIUM BICARBONATE: 84 INJECTION, SOLUTION INTRAVENOUS at 09:46

## 2020-09-08 RX ADMIN — HYDRALAZINE HYDROCHLORIDE 25 MG: 25 TABLET, FILM COATED ORAL at 22:26

## 2020-09-08 RX ADMIN — INSULIN GLARGINE 10 UNITS: 100 INJECTION, SOLUTION SUBCUTANEOUS at 22:25

## 2020-09-08 RX ADMIN — APIXABAN 2.5 MG: 2.5 TABLET, FILM COATED ORAL at 22:26

## 2020-09-08 RX ADMIN — INSULIN LISPRO 6 UNITS: 100 INJECTION, SOLUTION INTRAVENOUS; SUBCUTANEOUS at 12:20

## 2020-09-08 RX ADMIN — SODIUM BICARBONATE: 84 INJECTION, SOLUTION INTRAVENOUS at 00:24

## 2020-09-08 RX ADMIN — HYDRALAZINE HYDROCHLORIDE 25 MG: 25 TABLET, FILM COATED ORAL at 07:12

## 2020-09-08 RX ADMIN — HYDRALAZINE HYDROCHLORIDE 25 MG: 25 TABLET, FILM COATED ORAL at 14:13

## 2020-09-08 RX ADMIN — POLYETHYLENE GLYCOL 3350 17 G: 17 POWDER, FOR SOLUTION ORAL at 08:13

## 2020-09-08 RX ADMIN — INSULIN LISPRO 8 UNITS: 100 INJECTION, SOLUTION INTRAVENOUS; SUBCUTANEOUS at 18:12

## 2020-09-08 NOTE — PROGRESS NOTES
LOS: 3 days     Chief Complaint/ Reason for encounter: TIAGO/CKD/BP management  Chief Complaint   Patient presents with   • Weakness - Generalized         Subjective   Patient is doing well today with no new complaints  Good appetite with no nausea or vomiting  No shortness of breath chest pain or edema  Voiding well with no dysuria  BP up today      Medical history reviewed:  History of Present Illness    Subjective    History taken from: Patient and chart    Vital Signs  Temp:  [97.2 °F (36.2 °C)-99 °F (37.2 °C)] 98.5 °F (36.9 °C)  Heart Rate:  [69-84] 73  Resp:  [16] 16  BP: (146-192)/(75-88) 180/88       Wt Readings from Last 1 Encounters:   09/08/20 0408 58.3 kg (128 lb 9.6 oz)   09/07/20 0559 59.3 kg (130 lb 11.2 oz)   09/06/20 1308 55.6 kg (122 lb 8 oz)   09/06/20 0500 57.7 kg (127 lb 3.2 oz)       Objective    Objective:  General Appearance:  Comfortable, fairly well-appearing, in no acute distress and not in pain.  Awake, alert, oriented  HEENT: Mucous membranes moist, no injury, oropharynx clear  Lungs:  Normal effort and normal respiratory rate.  Breath sounds clear to auscultation.  No  respiratory distress.  No rales, decreased breath sounds or rhonchi.    Heart: Normal rate.  Regular rhythm.  S1 normal.  No murmur.   Abdomen: Abdomen is soft.  Bowel sounds are normal, no abdominal tenderness.  There is no rebound or guarding  Extremities: Normal range of motion.  No significant edema of bilateral lower extremities, distal pulses intact  Neurological: No focal motor or sensory deficits, pupils reactive  Skin:  Warm and dry.  No rash or cyanosis.       Results Review:    Intake/Output:     Intake/Output Summary (Last 24 hours) at 9/8/2020 1151  Last data filed at 9/8/2020 0900  Gross per 24 hour   Intake 1950 ml   Output --   Net 1950 ml         DATA:  Radiology and Labs:  The following labs independently reviewed by me. Additional labs ordered for tomorrow a.m.  Interval notes, chart personally  reviewed by me.   Old records independently reviewed showing my office notes reviewed, CKD 3, baseline creatinine around 1.3  The following radiologic studies independently viewed by me, findings no recent renal ultrasound  New problems include uncontrolled hypertension, anemia of CKD, metabolic acidosis  Discussed with patient at bedside    Risk/ complexity of medical care/ medical decision making moderate complexity    Labs:   Recent Results (from the past 24 hour(s))   POC Glucose Once    Collection Time: 09/07/20  4:25 PM   Result Value Ref Range    Glucose 242 (H) 70 - 130 mg/dL   POC Glucose Once    Collection Time: 09/07/20  9:04 PM   Result Value Ref Range    Glucose 209 (H) 70 - 130 mg/dL   Basic Metabolic Panel    Collection Time: 09/08/20  5:40 AM   Result Value Ref Range    Glucose 189 (H) 65 - 99 mg/dL    BUN 32 (H) 8 - 23 mg/dL    Creatinine 2.01 (H) 0.76 - 1.27 mg/dL    Sodium 136 136 - 145 mmol/L    Potassium 3.7 3.5 - 5.2 mmol/L    Chloride 107 98 - 107 mmol/L    CO2 19.3 (L) 22.0 - 29.0 mmol/L    Calcium 8.0 (L) 8.6 - 10.5 mg/dL    eGFR Non African Amer 32 (L) >60 mL/min/1.73    BUN/Creatinine Ratio 15.9 7.0 - 25.0    Anion Gap 9.7 5.0 - 15.0 mmol/L   CBC (No Diff)    Collection Time: 09/08/20  5:40 AM   Result Value Ref Range    WBC 6.44 3.40 - 10.80 10*3/mm3    RBC 3.22 (L) 4.14 - 5.80 10*6/mm3    Hemoglobin 10.2 (L) 13.0 - 17.7 g/dL    Hematocrit 33.3 (L) 37.5 - 51.0 %    .4 (H) 79.0 - 97.0 fL    MCH 31.7 26.6 - 33.0 pg    MCHC 30.6 (L) 31.5 - 35.7 g/dL    RDW 12.9 12.3 - 15.4 %    RDW-SD 48.5 37.0 - 54.0 fl    MPV 9.9 6.0 - 12.0 fL    Platelets 199 140 - 450 10*3/mm3   POC Glucose Once    Collection Time: 09/08/20  6:11 AM   Result Value Ref Range    Glucose 179 (H) 70 - 130 mg/dL   POC Glucose Once    Collection Time: 09/08/20 11:36 AM   Result Value Ref Range    Glucose 282 (H) 70 - 130 mg/dL       Radiology:  Imaging Results (Last 24 Hours)     ** No results found for the last 24  hours. **             Medications have been reviewed:  Current Facility-Administered Medications   Medication Dose Route Frequency Provider Last Rate Last Dose   • acetaminophen (TYLENOL) tablet 650 mg  650 mg Oral Q4H PRN Sarai Morgan APRN        Or   • acetaminophen (TYLENOL) 160 MG/5ML solution 650 mg  650 mg Oral Q4H PRN Sarai Morgan APRN        Or   • acetaminophen (TYLENOL) suppository 650 mg  650 mg Rectal Q4H PRN Sarai Morgan APRN       • amLODIPine (NORVASC) tablet 5 mg  5 mg Oral Q24H Osmin Fan MD       • apixaban (ELIQUIS) tablet 2.5 mg  2.5 mg Oral Q12H Douglas Paredes MD   2.5 mg at 09/08/20 0813   • dexamethasone (DECADRON) tablet 2 mg  2 mg Oral Daily With Breakfast Douglas Paredes MD   2 mg at 09/08/20 0813   • dextrose (D50W) 25 g/ 50mL Intravenous Solution 25 g  25 g Intravenous Q15 Min PRN Saari Morgan APRN       • dextrose (GLUTOSE) oral gel 15 g  15 g Oral Q15 Min PRN Sarai Morgan APRN       • glucagon (human recombinant) (GLUCAGEN DIAGNOSTIC) injection 1 mg  1 mg Subcutaneous PRN Sarai Morgan APRN       • hydrALAZINE (APRESOLINE) tablet 25 mg  25 mg Oral Q8H Margi Croft MD   25 mg at 09/08/20 0712   • insulin glargine (LANTUS) injection 5 Units  5 Units Subcutaneous Nightly Douglas Paredes MD   5 Units at 09/07/20 2141   • insulin lispro (humaLOG) injection 0-9 Units  0-9 Units Subcutaneous TID AC Sarai Morgan APRN   2 Units at 09/08/20 0812   • lactated ringers infusion  50 mL/hr Intravenous Continuous Osmin Fan MD       • nitroglycerin (NITROSTAT) SL tablet 0.4 mg  0.4 mg Sublingual Q5 Min PRN Sarai Morgan APRN       • ondansetron (ZOFRAN) injection 4 mg  4 mg Intravenous Q6H PRN Sarai Morgan APRN   4 mg at 09/06/20 0137   • polyethylene glycol (MIRALAX) packet 17 g  17 g Oral Daily Douglas Paredes MD   17 g at 09/08/20 0813   • sennosides-docusate (PERICOLACE) 8.6-50 MG per tablet 1 tablet  1 tablet Oral Nightly Reggie,  Douglas WOOD MD   1 tablet at 09/07/20 9898       ASSESSMENT:  1. ton, a bit worse today.  Previous baseline creatinine 1.3  2. ckd III, baseline creatinine 1.3  3. Hyperkalemia, improved  4. T2DM  5. Hypertension, above goal  6. Metabolic acidosis    Plan:  Renal function fairly stable today but still well above his usual baseline creatinine of 1.3  Will check baseline renal ultrasound and urine electrolytes  Urine studies from 9/5 were largely unremarkable  Change IV fluids to LR and decrease rate to 50 cc/h  Add amlodipine for hypertension  Follow-up a.[facility] labs    Osmin Fan MD   [facility]   Office phone number: [phone]  Answering service phone number: [phone]    09/08/20  11:51    Dictation performed using Dragon dictation software

## 2020-09-08 NOTE — PLAN OF CARE
Problem: Patient Care Overview  Goal: Plan of Care Review  Flowsheets (Taken 9/8/2020 1105)  Plan of Care Reviewed With: patient  Outcome Summary: Pt. able to ambulate 40 feet, CGA x 1, with use of Rwx this date. Pt. requires CGA/Min. assist x1 for bed mobility and CGA x 1 for sit <-> stand transfers.  BLE ther. ex. program x 10 reps completed for general strengthening. Verbal/tactile cues given during ambulation for posture correction and to increase his bilateral step length.   Patient was wearing a face mask during this therapy encounter. Therapist used appropriate personal protective equipment including eye protection, mask, and gloves.  Mask used was standard procedure mask. Appropriate PPE was worn during the entire therapy session. Hand hygiene was completed before and after therapy session. Patient is not in enhanced droplet precautions.

## 2020-09-08 NOTE — PLAN OF CARE
Problem: Skin Injury Risk (Adult)  Goal: Identify Related Risk Factors and Signs and Symptoms  Outcome: Ongoing (interventions implemented as appropriate)  Goal: Skin Health and Integrity  Outcome: Ongoing (interventions implemented as appropriate)     Problem: Fall Risk (Adult)  Goal: Identify Related Risk Factors and Signs and Symptoms  Outcome: Ongoing (interventions implemented as appropriate)  Goal: Absence of Fall  Outcome: Ongoing (interventions implemented as appropriate)     Problem: Patient Care Overview  Goal: Plan of Care Review  Outcome: Ongoing (interventions implemented as appropriate)  Flowsheets (Taken 9/8/2020 8871)  Progress: improving  Plan of Care Reviewed With: patient  Goal: Individualization and Mutuality  Outcome: Ongoing (interventions implemented as appropriate)  Goal: Discharge Needs Assessment  Outcome: Ongoing (interventions implemented as appropriate)  Goal: Interprofessional Rounds/Family Conf  Outcome: Ongoing (interventions implemented as appropriate)

## 2020-09-08 NOTE — PROGRESS NOTES
Discharge Planning Assessment  [facility]     Patient Name: Courtney Medrano  MRN: [id number]  Today's Date: 9/8/2020    Admit Date: 9/5/2020    Discharge Needs Assessment     Row Name 09/08/20 1449       Living Environment    Lives With  spouse    Name(s) of Who Lives With Patient  Vicky Medrano [phone]    Current Living Arrangements  home/apartment/condo    Potentially Unsafe Housing Conditions  other (see comments) no concerns    Primary Care Provided by  self    Provides Primary Care For  no one, unable/limited ability to care for self    Family Caregiver if Needed  spouse;child(christiano), adult    Family Caregiver Names  Vicky Medrano [phone]; Osmin Medrano    Quality of Family Relationships  helpful;involved;supportive       Resource/Environmental Concerns    Resource/Environmental Concerns  home accessibility    Home Accessibility Concerns  stairs to enter home    Transportation Concerns  car, none       Transition Planning    Patient/Family Anticipates Transition to  inpatient rehabilitation facility    Patient/Family Anticipated Services at Transition  skilled nursing    Transportation Anticipated  family or friend will provide       Discharge Needs Assessment    Readmission Within the Last 30 Days  no previous admission in last 30 days    Concerns to be Addressed  discharge planning;care coordination/care conferences    Equipment Currently Used at Home  wheelchair;walker, rolling;cane, straight    Anticipated Changes Related to Illness  none    Equipment Needed After Discharge  none    Outpatient/Agency/Support Group Needs  skilled nursing facility    Discharge Facility/Level of Care Needs  nursing facility, skilled    Provided Post Acute Provider List?  N/A    N/A Provider List Comment  Spouse declined list at this time and requested referral to [facility]    Current Discharge Risk  physical impairment        Discharge Plan     Row Name 09/08/20 3345       Plan    Plan  Pending referral to  [facility]; will require [employer] pre-cert    Provided Post Acute Provider Quality & Resource List?  N/A    N/A Quality & Resource List Comment  Spouse declined CMS ratings at this time    Patient/Family in Agreement with Plan  yes    Plan Comments  CCP called patient's spouse Vicky Medrano [phone] due to patient being confused. Role explained and discharge planning discussed. Face sheet verified and IMM noted. PCP is Shantell Alvarez PA-C. Patient lives with his spouse in a single level home with two steps at the entrance. Patient uses a walker, wheelchair, and cane at home and requires assistance with his ADLs. Patient is current with [facility] and has been to [facility] for skilled rehab. Spouse declined HH/SNF list and CMS ratings and requested referral to [facility]. Referral placed in [facility] and secure voicemail left with [facility]. [facility]. Family will transport patient pending mobility and progress. Packet started in CCP office. Will require [employer] pre-cert. CCP will follow up on pending SNF referral, place additional referrals if needed, and arrange transportation if needed. Marichuy Ventura [facility]        Destination      Service Provider Request Status Selected Services Address Phone Number Fax Number    [facility] Pending - Request Sent N/A [address] [place] [address] [phone]      Durable Medical Equipment      Coordination has not been started for this encounter.      Dialysis/Infusion      Coordination has not been started for this encounter.      Home Medical Care      Service Provider Request Status Selected Services Address Phone Number Fax Number    [facility] Pending - Request Sent N/A [address], [address], [facility] [address] [phone]      Therapy      Coordination has not been started for this encounter.      Community Resources      Coordination has  not been started for this encounter.          Demographic Summary     Row Name 09/08/20 1448       General Information    Admission Type  inpatient    Arrived From  emergency department    Required Notices Provided  Important Message from Medicare    Referral Source  admission list    Reason for Consult  discharge planning    Preferred Language  English     Used During This Interaction  no        Functional Status     Row Name 09/08/20 1449       Functional Status    Usual Activity Tolerance  moderate    Current Activity Tolerance  moderate       Functional Status, IADL    Medications  assistive person    Meal Preparation  assistive person    Housekeeping  assistive person    Laundry  assistive person    Shopping  assistive person       Mental Status Summary    Recent Changes in Mental Status/Cognitive Functioning  unable to assess       Employment/    Employment Status  retired        Psychosocial    No documentation.       Abuse/Neglect    No documentation.       Legal    No documentation.       Substance Abuse    No documentation.       Patient Forms    No documentation.           [facility]

## 2020-09-08 NOTE — PROGRESS NOTES
Name: Courtney Medrano ADMIT: 2020   : 1940  PCP: Shantell Alvarez PA-C    MRN: [id number] LOS: 3 days   AGE/SEX: 80 y.o. male  ROOM: [facility]     Subjective   Subjective   Asleep but awakens easily.  Says this is the first time he has been awake since Saturday.  Is somewhat disoriented.  Denies any specific physical complaints.    Review of Systems   Unable to perform ROS: Dementia        Objective   Objective   Vital Signs  Temp:  [97.2 °F (36.2 °C)-99 °F (37.2 °C)] 98.5 °F (36.9 °C)  Heart Rate:  [69-84] 73  Resp:  [16] 16  BP: (146-192)/(75-88) 180/88  SpO2:  [99 %] 99 %  on   ;   Device (Oxygen Therapy): room air  Body mass index is 20.14 kg/m².  Physical Exam   Constitutional: He appears listless. He is cooperative. He has a sickly appearance. No distress.   Neck: No JVD present. No tracheal deviation present.   Cardiovascular: Normal rate and regular rhythm.   No murmur heard.  Pulmonary/Chest: Effort normal and breath sounds normal. No respiratory distress.   Abdominal: Soft. Normal appearance and bowel sounds are normal. He exhibits no distension. There is no tenderness.   Musculoskeletal: He exhibits no edema.   Neurological: He appears listless. He is disoriented.   Skin: Skin is warm and dry.   Psychiatric: He has a normal mood and affect. His behavior is normal. Cognition and memory are impaired.   Nursing note and vitals reviewed.      Results Review:       I reviewed the patient's new clinical results.  Results from last 7 days   Lab Units 20  0540 20  0611 20  0704 20  2039   WBC 10*3/mm3 6.44 6.91 7.49 8.25   HEMOGLOBIN g/dL 10.2* 9.8* 9.6* 10.6*   PLATELETS 10*3/mm3 199 210 220 264     Results from last 7 days   Lab Units 20  0540 20  0611 20  1427 20  0704   SODIUM mmol/L 136 133* 130* 131*   POTASSIUM mmol/L 3.7 4.2 5.6* 5.6*   CHLORIDE mmol/L 107 106 105 106   CO2 mmol/L 19.3* 18.9* 16.7* 17.2*   BUN mg/dL 32* 29* 35* 36*   CREATININE  mg/dL 2.01* 1.83* 2.23* 2.13*   GLUCOSE mg/dL 189* 190* 163* 285*   Estimated Creatinine Clearance: 25 mL/min (A) (by C-G formula based on SCr of 2.01 mg/dL (H)).  Results from last 7 days   Lab Units 09/05/20  2039   ALBUMIN g/dL 4.30   BILIRUBIN mg/dL 0.2   ALK PHOS U/L 63   AST (SGOT) U/L 12   ALT (SGPT) U/L 7     Results from last 7 days   Lab Units 09/08/20  0540 09/07/20  0611 09/06/20  1427 09/06/20  0704 09/05/20  2039   CALCIUM mg/dL 8.0* 7.8* 8.2* 8.1* 9.5   ALBUMIN g/dL  --   --   --   --  4.30   MAGNESIUM mg/dL  --  2.0  --   --   --        SARS-CoV-2 PCR   Date Value Ref Range Status   09/05/2020 Not Detected  Final     Comment:     Nucleic acid specific to SARS-CoV-2 (COVID-19) virus was not detected inthis sample by the TaqPath (TM) COVID-19 Combo Kit.SARS-CoV-2 (COVID-19) nucleic acid testing performed using [employer] Aptima (R) SARS-CoV-2 Assay or [employer] TaqPath   (TM)COVID-19 Combo Kit as indicated above under Emergency Use Authorization (EUA) from the FDA. Aptima (R) and TaqPath (TM) test performancecharacteristics verified by [facility] in accordance with the FDAs Guidance Document (Policy for Diagnostic   Tests for Coronavirus Disease-2019during the Public Health Emergency) issued on March 16, 2020. The laboratory is regulated under CLIA as qualified to perform high-complexity testingUnless otherwise noted, all testing was performed at [facility],   CLIA No. [id number], [place] State Licensee No. [id number]     Hemoglobin A1C   Date/Time Value Ref Range Status   09/06/2020 0704 9.00 (H) 4.80 - 5.60 % Final     Glucose   Date/Time Value Ref Range Status   09/08/2020 1136 282 (H) 70 - 130 mg/dL Final   09/08/2020 0611 179 (H) 70 - 130 mg/dL Final   09/07/2020 2104 209 (H) 70 - 130 mg/dL Final   09/07/2020 1625 242 (H) 70 - 130 mg/dL Final   09/07/2020 1149 262 (H) 70 - 130 mg/dL Final   09/07/2020 0607 182 (H) 70 - 130 mg/dL Final   09/06/2020 1936 242 (H) 70 - 130 mg/dL Final              XR Abdomen KUB  ONE VIEW ABDOMEN     HISTORY: Constipation. Abdominal pain.     FINDINGS: There is a moderate amount of stool in the right colon and to  a lesser extent scattered in the remainder the colon. There is also  moderate amount of bowel gas scattered in nondilated small bowel. The  findings are consistent with history of constipation. There is no fecal  impaction.     This report was finalized on 9/6/2020 2:10 PM by Dr. Ismael Perez M.D.     XR Chest 1 View  Narrative: PORTABLE CHEST RADIOGRAPH     HISTORY: Cough.     COMPARISON: 02/26/2020     FINDINGS:  Cardiac silhouette is stable. Patient does appear to have a right  basilar infiltrate. No pneumothorax or pleural effusion is seen.     Impression: Right basilar infiltrate, concerning for pneumonia.     This report was finalized on 9/6/2020 5:43 AM by Dr. Concetta Cardenas M.D.           amLODIPine 5 mg Oral Q24H   apixaban 2.5 mg Oral Q12H   dexamethasone 2 mg Oral Daily With Breakfast   hydrALAZINE 25 mg Oral Q8H   insulin glargine 5 Units Subcutaneous Nightly   insulin lispro 0-9 Units Subcutaneous TID AC   polyethylene glycol 17 g Oral Daily   senna-docusate sodium 1 tablet Oral Nightly       lactated ringers 50 mL/hr Last Rate: 50 mL/hr (09/08/20 1221)   Diet Regular; Cardiac, Consistent Carbohydrate       Assessment/Plan     Active Hospital Problems    Diagnosis  POA   • **TIAGO (acute kidney injury) (CMS/[facility]) [N17.9]  Yes   • Current chronic use of systemic steroids [Z79.52]  Not Applicable   • Failure to thrive in adult [R62.7]  Yes   • Chronic anticoagulation [Z79.01]  Not Applicable   • Generalized weakness [R53.1]  Yes   • Hyperkalemia [E87.5]  Yes   • Type 2 diabetes mellitus with diabetic chronic kidney disease (CMS/[facility]) [E11.22]  Yes   • Tobacco use [Z72.0]  Yes   • CKD (chronic kidney disease) stage 3, GFR 30-59 ml/min (CMS/[facility]) [N18.3]  Yes   • Anemia of chronic disease [D63.8]  Yes   • Benign essential HTN [I10]  Yes   •  Constipation [K59.00]  Yes   • Vitamin D deficiency [E55.9]  Yes   • Diabetic peripheral neuropathy (CMS/HCC) [E11.42]  Yes   • Chronic fatigue [R53.82]  Yes   • Hyperlipidemia [E78.5]  Yes      Resolved Hospital Problems   No resolved problems to display.       80 y.o. male admitted with acute kidney injury and failure to thrive.  This has been going on for several weeks having just spent 3 weeks in a skilled nursing facility.  Remains hospitalized over renal issues.    Appreciate assistance from nephrology.  Defer management of TIAGO to them.  He has underlying CKD3.  His bigger issue seems to be failure to thrive which has evidently been going on for months if not years.  He is on chronic steroid therapy presumably for appetite stimulation.  Not sure this is an issue we will conquer during this hospital stay.  CCP to look into disposition options.  Continue current care otherwise.  Blood sugar elevated, will increase insulin somewhat.      · Eliquis (home med) for DVT prophylaxis.  · Full code.  · Discussed with patient and care team on multidisciplinary rounds.  · Anticipate discharge to SNU facility timing yet to be determined.      Ramakrishna Oerllana MD  [facility]  09/08/20  13:55    Patient was placed in face mask on first look.  I wore protective equipment throughout this patient encounter including a face mask, gloves and protective eyewear.  Hand hygiene was performed before donning protective equipment and after removal when leaving the room.

## 2020-09-08 NOTE — DISCHARGE PLACEMENT REQUEST
"Paul Medrano (80 y.o. Male)     Date of Birth Social Security Number Address Home Phone MRN    1940  [id number]  [facility] [phone] [id number]    [employer] Marital Status          Orthodoxy        Admission Date Admission Type Admitting Provider Attending Provider Department, Room/Bed    9/5/20 Emergency Ramakrishna Orellana MD Ray, Jonathan, MD [address], [address]    Discharge Date Discharge Disposition Discharge Destination                       Attending Provider:  Ramakrishna Orellana MD    Allergies:  Flexeril [Cyclobenzaprine]    Isolation:  None   Infection:  None   Code Status:  CPR    Ht:  170.2 cm (67\")   Wt:  58.3 kg (128 lb 9.6 oz)    Admission Cmt:  None   Principal Problem:  TIAGO (acute kidney injury) (CMS/[facility]) [N17.9]                 Active Insurance as of 9/5/2020     Primary Coverage     Payor Plan Insurance Group Employer/Plan Group    HUMANA MEDICARE REPLACEMENT HU[employer]NA MEDICARE REPLACEMENT [id number]     Payor Plan Address Payor Plan Phone Number Payor Plan Fax Number Effective Dates    [address] [address] [phone]  1/1/2018 - None Entered    [facility] [address]       Subscriber Name Subscriber Birth Date Member ID       PAUL MEDRANO 1940 [id number]                 Emergency Contacts      (Rel.) Home Phone Work Phone Mobile Phone    Vicky Medrano (Spouse) [phone] -- --    Osmin Medrano (Power of ) -- -- [phone]            "

## 2020-09-08 NOTE — THERAPY TREATMENT NOTE
Patient Name: Courtney Medrano  : 1940    MRN: [id number]                              Today's Date: 2020       Admit Date: 2020    Visit Dx:     ICD-10-CM ICD-9-CM   1. Generalized weakness R53.1 780.79   2. Acute renal insufficiency N28.9 593.9   3. Hyperkalemia E87.5 276.7     Patient Active Problem List   Diagnosis   • Anxiety disorder   • Recurrent major depressive disorder, in full remission (CMS/[facility])   • Constipation   • Diabetes type 2, uncontrolled (CMS/[facility])   • GERD (gastroesophageal reflux disease)   • Hyperlipidemia   • Low back pain   • Prostatitis   • Pulmonary nodule   • Diabetic peripheral neuropathy (CMS/[facility])   • Chronic fatigue   • Vitamin D deficiency   • Noncompliance of patient with dietary regimen   • Uncontrolled type 2 diabetes mellitus (CMS/[facility])   • Constipation   • Leg pain   • Benign essential HTN   • Noncompliance with medications   • Perirectal abscess   • Anxiety   • CKD (chronic kidney disease) stage 3, GFR 30-59 ml/min (CMS/[facility])   • Anemia of chronic disease   • Low hemoglobin   • PAD (peripheral artery disease) (CMS/[facility])   • Polyp of colon   • Pyelonephritis   • Hematuria   • Tobacco use   • Type 2 diabetes mellitus with diabetic chronic kidney disease (CMS/[facility])   • Contusion of rib on right side   • Weight loss   • Generalized weakness   • Hyperkalemia   • TIAGO (acute kidney injury) (CMS/[facility])   • Current chronic use of systemic steroids   • Failure to thrive in adult   • Chronic anticoagulation     Past Medical History:   Diagnosis Date   • Type 2 diabetes mellitus (CMS/[facility])    • Vitamin D deficiency      Past Surgical History:   Procedure Laterality Date   • BACK SURGERY     • CYSTOSCOPY W/ URETERAL STENT PLACEMENT Left 2020    Procedure: CYSTOSCOPY URETERAL CATHETER/STENT INSERTION;  Surgeon: Clark Ramirez MD;  Location: [facility];  Service: Urology   • KIDNEY STONE SURGERY     • URINARY SURGERY      stent placement     General Information     Row  "Name 09/08/20 1100          PT Evaluation Time/Intention    Document Type  therapy note (daily note) Pt. reports feeling a \"cramp\" in his Left calf during ambulation as well as overall fatigue. Otherwise, pt. agreeable to work with P.T.   -MS     Mode of Treatment  physical therapy;individual therapy  -MS     Row Name 09/08/20 1100          General Information    Patient Profile Reviewed?  yes  -MS     Existing Precautions/Restrictions  (S) fall Exit alarm   -MS     Row Name 09/08/20 1100          Cognitive Assessment/Intervention- PT/OT    Orientation Status (Cognition)  oriented to;person;place  -MS     Row Name 09/08/20 1100          Safety Issues, Functional Mobility    Comment, Safety Issues/Impairments (Mobility)  Gait belt used for safety.   -MS       User Key  (r) = Recorded By, (t) = Taken By, (c) = Cosigned By    Initials Name Provider Type    MS Balaji Azul KENDRA, PT Physical Therapist        Mobility     Row Name 09/08/20 1101          Bed Mobility Assessment/Treatment    Supine-Sit [place] (Bed Mobility)  minimum assist (75% patient effort)  -MS     Sit-Supine [place] (Bed Mobility)  contact guard  -MS     Comment (Bed Mobility)  Pt. does require CGA/Min. assist x 1 for dynamic sitting balance. Posterior lean.   -MS     Row Name 09/08/20 1101          Sit-Stand Transfer    Sit-Stand [place] (Transfers)  contact guard  -MS     Assistive Device (Sit-Stand Transfers)  walker, front-wheeled  -MS     Row Name 09/08/20 1101          Gait/Stairs Assessment/Training    [place] Level (Gait)  contact guard  -MS     Assistive Device (Gait)  walker, front-wheeled  -MS     Distance in Feet (Gait)  40 feet  -MS     Pattern (Gait)  step-through  -MS     Deviations/Abnormal Patterns (Gait)  demian decreased;antalgic;stride length decreased  -MS     Bilateral Gait Deviations  forward flexed posture  -MS     Comment (Gait/Stairs)  Verbal/tactile cues for posture correction and to increase his " "bilateral step length.  Limited by overall fatigue.   -MS       User Key  (r) = Recorded By, (t) = Taken By, (c) = Cosigned By    Initials Name Provider Type    Balaji King, PT Physical Therapist        Obj/Interventions     Row Name 09/08/20 1103          Therapeutic Exercise    Comment (Therapeutic Exercise)  BLE ther. ex. program x 10 reps completed (Ankle Pumps, LAQ's, Hip Flexion)  -MS       User Key  (r) = Recorded By, (t) = Taken By, (c) = Cosigned By    Initials Name Provider Type    Balaji King, PT Physical Therapist        Goals/Plan    No documentation.       Clinical Impression     Row Name 09/08/20 1103          Pain Scale: Numbers Pre/Post-Treatment    Pain Scale: Numbers, Pretreatment  3/10  -MS     Pain Scale: Numbers, Post-Treatment  3/10  -MS     Pain Location - Side  Left  -MS     Pain Location  calf  -MS     Pre/Post Treatment Pain Comment  \"cramp\" pain when upright.   -MS     Row Name 09/08/20 1103          Positioning and Restraints    Pre-Treatment Position  in bed  -MS     Post Treatment Position  bed  -MS     In Bed  notified nsg;supine;call light within reach;encouraged to call for assist;exit alarm on All lines intact.   -MS       User Key  (r) = Recorded By, (t) = Taken By, (c) = Cosigned By    Initials Name Provider Type    Balaji King, PT Physical Therapist        Outcome Measures     Row Name 09/08/20 1104          How much help from another person do you currently need...    Turning from your back to your side while in flat bed without using bedrails?  3  -MS     Moving from lying on back to sitting on the side of a flat bed without bedrails?  3  -MS     Moving to and from a bed to a chair (including a wheelchair)?  3  -MS     Standing up from a chair using your arms (e.g., wheelchair, bedside chair)?  3  -MS     Climbing 3-5 steps with a railing?  2  -MS     To walk in hospital room?  3  -MS     AM-PAC 6 Clicks Score (PT)  17  -MS     Row Name 09/08/20 1104 "          Functional Assessment    Outcome Measure Options  AM-PAC 6 Clicks Basic Mobility (PT)  -MS       User Key  (r) = Recorded By, (t) = Taken By, (c) = Cosigned By    Initials Name Provider Type    Balaji King KENDRA, PT Physical Therapist        Physical Therapy Education                 Title: PT OT SLP Therapies (Done)     Topic: Physical Therapy (Done)     Point: Mobility training (Done)     Description:   Instruct learner(s) on safety and technique for assisting patient out of bed, chair or wheelchair.  Instruct in the proper use of assistive devices, such as walker, crutches, cane or brace.              Patient Friendly Description:   It's important to get you on your feet again, but we need to do so in a way that is safe for you. Falling has serious consequences, and your personal safety is the most important thing of all.        When it's time to get out of bed, one of us or a family member will sit next to you on the bed to give you support.     If your doctor or nurse tells you to use a walker, crutches, a cane, or a brace, be sure you use it every time you get out of bed, even if you think you don't need it.    Learning Progress Summary           Patient Acceptance, E,D, VU,NR by MS at 9/8/2020 1104    Acceptance, E,TB,D, VU,NR by  at 9/7/2020 1510    Acceptance, E, NR by  at 9/6/2020 1604    Acceptance, E, VU by AS at 9/6/2020 0451                   Point: Home exercise program (Done)     Description:   Instruct learner(s) on appropriate technique for monitoring, assisting and/or progressing patient with therapeutic exercises and activities.              Learning Progress Summary           Patient Acceptance, E,D, VU,NR by MS at 9/8/2020 1104    Acceptance, E,TB,D, VU,NR by  at 9/7/2020 1510    Acceptance, E, VU by AS at 9/6/2020 0451                   Point: Body mechanics (Done)     Description:   Instruct learner(s) on proper positioning and spine alignment for patient and/or caregiver  during mobility tasks and/or exercises.              Learning Progress Summary           Patient Acceptance, E,D, VU,NR by MS at 9/8/2020 1104    Acceptance, E,TB,D, VU,NR by  at 9/7/2020 1510    Acceptance, E, VU by AS at 9/6/2020 0451                   Point: Precautions (Done)     Description:   Instruct learner(s) on prescribed precautions during mobility and gait tasks              Learning Progress Summary           Patient Acceptance, E,D, VU,NR by MS at 9/8/2020 1104    Acceptance, E,TB,D, VU,NR by  at 9/7/2020 1510    Acceptance, E, VU by AS at 9/6/2020 0451                               User Key     Initials Effective Dates Name Provider Type Discipline     04/03/18 -  Brenna Slater, PT Physical Therapist PT    MS 04/03/18 -  Balaji Azul, PT Physical Therapist PT     03/07/18 -  Charlene Reid, AMAYA Physical Therapy Assistant PT    AS 08/10/20 -  Selwyn Lopez RN Registered Nurse Nurse              PT Recommendation and Plan     Plan of Care Reviewed With: patient  Outcome Summary: Pt. able to ambulate 40 feet, CGA x 1, with use of Rwx this date. Pt. requires CGA/Min. assist x1 for bed mobility and CGA x 1 for sit <-> stand transfers.  BLE ther. ex. program x 10 reps completed for general strengthening. Verbal/tactile cues given during ambulation for posture correction and to increase his bilateral step length.     Time Calculation:   PT Charges     Row Name 09/08/20 1106             Time Calculation    Start Time  1027  -MS      Stop Time  1040  -MS      Time Calculation (min)  13 min  -MS      PT Received On  09/08/20  -MS      PT - Next Appointment  09/09/20  -MS         Time Calculation- PT    Total Timed Code Minutes- PT  12 minute(s)  -MS        User Key  (r) = Recorded By, (t) = Taken By, (c) = Cosigned By    Initials Name Provider Type    MS Balaji Azul, PT Physical Therapist        Therapy Charges for Today     Code Description Service Date Service Provider  Modifiers Qty    51213404924 HC PT THER PROC EA 15 MIN 9/8/2020 Balaji Azul, PT GP 1          PT G-Codes  Outcome Measure Options: AM-PAC 6 Clicks Basic Mobility (PT)  AM-PAC 6 Clicks Score (PT): 17    Balaji Azul, PT  9/8/2020

## 2020-09-09 LAB
ALBUMIN SERPL-MCNC: 3.9 G/DL (ref 3.5–5.2)
ANION GAP SERPL CALCULATED.3IONS-SCNC: 10.9 MMOL/L (ref 5–15)
BUN SERPL-MCNC: 27 MG/DL (ref 8–23)
BUN/CREAT SERPL: 16.2 (ref 7–25)
CALCIUM SPEC-SCNC: 8.7 MG/DL (ref 8.6–10.5)
CHLORIDE SERPL-SCNC: 109 MMOL/L (ref 98–107)
CO2 SERPL-SCNC: 19.1 MMOL/L (ref 22–29)
CREAT SERPL-MCNC: 1.67 MG/DL (ref 0.76–1.27)
GFR SERPL CREATININE-BSD FRML MDRD: 40 ML/MIN/1.73
GLUCOSE BLDC GLUCOMTR-MCNC: 312 MG/DL (ref 70–130)
GLUCOSE BLDC GLUCOMTR-MCNC: 343 MG/DL (ref 70–130)
GLUCOSE BLDC GLUCOMTR-MCNC: 343 MG/DL (ref 70–130)
GLUCOSE BLDC GLUCOMTR-MCNC: 84 MG/DL (ref 70–130)
GLUCOSE SERPL-MCNC: 114 MG/DL (ref 65–99)
PHOSPHATE SERPL-MCNC: 3.3 MG/DL (ref 2.5–4.5)
POTASSIUM SERPL-SCNC: 3.6 MMOL/L (ref 3.5–5.2)
SODIUM SERPL-SCNC: 139 MMOL/L (ref 136–145)

## 2020-09-09 PROCEDURE — 80069 RENAL FUNCTION PANEL: CPT | Performed by: INTERNAL MEDICINE

## 2020-09-09 PROCEDURE — 63710000001 INSULIN GLARGINE PER 5 UNITS: Performed by: HOSPITALIST

## 2020-09-09 PROCEDURE — 82962 GLUCOSE BLOOD TEST: CPT

## 2020-09-09 PROCEDURE — U0004 COV-19 TEST NON-CDC HGH THRU: HCPCS | Performed by: HOSPITALIST

## 2020-09-09 PROCEDURE — 97110 THERAPEUTIC EXERCISES: CPT

## 2020-09-09 PROCEDURE — 63710000001 INSULIN LISPRO (HUMAN) PER 5 UNITS: Performed by: NURSE PRACTITIONER

## 2020-09-09 RX ORDER — HYDRALAZINE HYDROCHLORIDE 50 MG/1
50 TABLET, FILM COATED ORAL EVERY 8 HOURS SCHEDULED
Status: DISCONTINUED | OUTPATIENT
Start: 2020-09-09 | End: 2020-09-11 | Stop reason: HOSPADM

## 2020-09-09 RX ADMIN — ACETAMINOPHEN 650 MG: 325 TABLET, FILM COATED ORAL at 17:40

## 2020-09-09 RX ADMIN — APIXABAN 2.5 MG: 2.5 TABLET, FILM COATED ORAL at 21:19

## 2020-09-09 RX ADMIN — AMLODIPINE BESYLATE 5 MG: 5 TABLET ORAL at 09:11

## 2020-09-09 RX ADMIN — HYDRALAZINE HYDROCHLORIDE 50 MG: 50 TABLET, FILM COATED ORAL at 21:19

## 2020-09-09 RX ADMIN — POLYETHYLENE GLYCOL 3350 17 G: 17 POWDER, FOR SOLUTION ORAL at 09:11

## 2020-09-09 RX ADMIN — INSULIN GLARGINE 10 UNITS: 100 INJECTION, SOLUTION SUBCUTANEOUS at 21:19

## 2020-09-09 RX ADMIN — HYDRALAZINE HYDROCHLORIDE 50 MG: 50 TABLET, FILM COATED ORAL at 14:53

## 2020-09-09 RX ADMIN — APIXABAN 2.5 MG: 2.5 TABLET, FILM COATED ORAL at 09:11

## 2020-09-09 RX ADMIN — HYDRALAZINE HYDROCHLORIDE 25 MG: 25 TABLET, FILM COATED ORAL at 06:56

## 2020-09-09 RX ADMIN — DOCUSATE SODIUM 50MG AND SENNOSIDES 8.6MG 1 TABLET: 8.6; 5 TABLET, FILM COATED ORAL at 21:19

## 2020-09-09 RX ADMIN — DEXAMETHASONE 2 MG: 2 TABLET ORAL at 09:11

## 2020-09-09 RX ADMIN — INSULIN LISPRO 7 UNITS: 100 INJECTION, SOLUTION INTRAVENOUS; SUBCUTANEOUS at 12:19

## 2020-09-09 RX ADMIN — INSULIN LISPRO 4 UNITS: 100 INJECTION, SOLUTION INTRAVENOUS; SUBCUTANEOUS at 17:16

## 2020-09-09 NOTE — PROGRESS NOTES
LOS: 4 days     Chief Complaint/ Reason for encounter: TIAGO/CKD/BP management  Chief Complaint   Patient presents with   • Weakness - Generalized         Subjective   Patient is doing well today with no new complaints  Good appetite with no nausea or vomiting  No shortness of breath chest pain or edema  Voiding well with no dysuria  BP still up today, good urine output, weight is actually down since admission if daily weights are accurate      Medical history reviewed:  History of Present Illness    Subjective    History taken from: Patient and chart    Vital Signs  Temp:  [97.7 °F (36.5 °C)-99.1 °F (37.3 °C)] 99.1 °F (37.3 °C)  Heart Rate:  [72-80] 80  Resp:  [16-18] 16  BP: (163-184)/(73-95) 173/95       Wt Readings from Last 1 Encounters:   09/09/20 0559 54.1 kg (119 lb 3.2 oz)   09/08/20 0408 58.3 kg (128 lb 9.6 oz)   09/07/20 0559 59.3 kg (130 lb 11.2 oz)   09/06/20 1308 55.6 kg (122 lb 8 oz)   09/06/20 0500 57.7 kg (127 lb 3.2 oz)       Objective    Objective:  General Appearance:  Comfortable, fairly well-appearing, in no acute distress and not in pain.  Awake, alert, oriented  HEENT: Mucous membranes moist, no injury, oropharynx clear  Lungs:  Normal effort and normal respiratory rate.  Breath sounds clear to auscultation.  No  respiratory distress.  No rales, decreased breath sounds or rhonchi.    Heart: Normal rate.  Regular rhythm.  S1 normal.  No murmur.   Abdomen: Abdomen is soft.  Bowel sounds are normal, no abdominal tenderness.  There is no rebound or guarding  Extremities: Normal range of motion.  No significant edema of bilateral lower extremities, distal pulses intact  Neurological: No focal motor or sensory deficits, pupils reactive  Skin:  Warm and dry.  No rash or cyanosis.       Results Review:    Intake/Output:     Intake/Output Summary (Last 24 hours) at 9/9/2020 1234  Last data filed at 9/9/2020 0911  Gross per 24 hour   Intake 930 ml   Output --   Net 930 ml         DATA:  Radiology and  Labs:  The following labs independently reviewed by me. Additional labs ordered for tomorrow a.m.  Interval notes, chart personally reviewed by me.   Old records independently reviewed showing my office notes reviewed, CKD 3, baseline creatinine around 1.3  The following radiologic studies independently viewed by me, findings no recent renal ultrasound  Discussed with patient at bedside    Risk/ complexity of medical care/ medical decision making moderate complexity    Labs:   Recent Results (from the past 24 hour(s))   Sodium, Urine, Random - Urine, Clean Catch    Collection Time: 09/08/20  3:35 PM   Result Value Ref Range    Sodium, Urine 101 mmol/L   Creatinine, Urine, Random - Urine, Clean Catch    Collection Time: 09/08/20  3:35 PM   Result Value Ref Range    Creatinine, Urine 24.1 mg/dL   Chloride, Urine, Random - Urine, Clean Catch    Collection Time: 09/08/20  3:35 PM   Result Value Ref Range    Chloride, Urine 62 mmol/L   Eosinophil Smear - Urine, Urine, Clean Catch    Collection Time: 09/08/20  3:35 PM   Result Value Ref Range    Eosinophil Smear 0 0 - 0 % EOS/100 Cells   POC Glucose Once    Collection Time: 09/08/20  5:43 PM   Result Value Ref Range    Glucose 350 (H) 70 - 130 mg/dL   POC Glucose Once    Collection Time: 09/08/20  9:07 PM   Result Value Ref Range    Glucose 311 (H) 70 - 130 mg/dL   Renal Function Panel    Collection Time: 09/09/20  5:36 AM   Result Value Ref Range    Glucose 114 (H) 65 - 99 mg/dL    BUN 27 (H) 8 - 23 mg/dL    Creatinine 1.67 (H) 0.76 - 1.27 mg/dL    Sodium 139 136 - 145 mmol/L    Potassium 3.6 3.5 - 5.2 mmol/L    Chloride 109 (H) 98 - 107 mmol/L    CO2 19.1 (L) 22.0 - 29.0 mmol/L    Calcium 8.7 8.6 - 10.5 mg/dL    Albumin 3.90 3.50 - 5.20 g/dL    Phosphorus 3.3 2.5 - 4.5 mg/dL    Anion Gap 10.9 5.0 - 15.0 mmol/L    BUN/Creatinine Ratio 16.2 7.0 - 25.0    eGFR Non African Amer 40 (L) >60 mL/min/1.73   POC Glucose Once    Collection Time: 09/09/20  5:54 AM   Result Value  Ref Range    Glucose 84 70 - 130 mg/dL   POC Glucose Once    Collection Time: 09/09/20 11:46 AM   Result Value Ref Range    Glucose 343 (H) 70 - 130 mg/dL       Radiology:  Imaging Results (Last 24 Hours)     Procedure Component Value Units Date/Time    US Renal Bilateral [[id number]] Collected:  09/08/20 1745     Updated:  09/08/20 1755    Narrative:       US RENAL BILATERAL-     INDICATIONS: Acute renal failure     TECHNIQUE: ULTRASOUND OF THE KIDNEYS AND URINARY BLADDER.     COMPARISON: CT from 01/11/2020     FINDINGS:     The right kidney measures 11.0 centimeters, the left kidney measures  11.2 centimeters.     No renal lesion is identified. Mild left upper pole caliectasis. No  ziyad hydronephrosis. Echogenic nonobstructive stones are apparent in  each kidney. Trace right perinephric fluid.     The prostate is enlarged, 5.6 cm, PSA correlation recommended as  indicated. Prominent prostatic impression on the urinary bladder appears  grossly similar to the prior CT exam The urinary bladder otherwise  appears unremarkable. Bilateral ureteral jets were observed.       Impression:       Mild left upper pole caliectasis. No ziyad hydronephrosis.  Nonobstructive stones are apparent in each kidney. Enlarged prostate  with prominent prostatic impression on the urinary bladder.     This report was finalized on 9/8/2020 5:52 PM by Dr. Jorge L Alex M.D.                Medications have been reviewed:  Current Facility-Administered Medications   Medication Dose Route Frequency Provider Last Rate Last Dose   • acetaminophen (TYLENOL) tablet 650 mg  650 mg Oral Q4H PRN Sarai Morgan, APRN       • amLODIPine (NORVASC) tablet 5 mg  5 mg Oral Q24H Osmin Fan MD   5 mg at 09/09/20 0911   • apixaban (ELIQUIS) tablet 2.5 mg  2.5 mg Oral Q12H Douglas Paredes MD   2.5 mg at 09/09/20 0911   • dexamethasone (DECADRON) tablet 2 mg  2 mg Oral Daily With Breakfast Douglas Paredes MD   2 mg at 09/09/20 0911   • dextrose  (D50W) 25 g/ 50mL Intravenous Solution 25 g  25 g Intravenous Q15 Min PRN Sarai Morgan APRN       • dextrose (GLUTOSE) oral gel 15 g  15 g Oral Q15 Min PRN Sarai Morgan APRN       • glucagon (human recombinant) (GLUCAGEN DIAGNOSTIC) injection 1 mg  1 mg Subcutaneous PRN Sarai Morgan APRN       • hydrALAZINE (APRESOLINE) tablet 50 mg  50 mg Oral Q8H Osmin Fan MD       • insulin glargine (LANTUS) injection 10 Units  10 Units Subcutaneous Nightly Ramakrishna Orellana MD   10 Units at 09/08/20 2225   • insulin lispro (humaLOG) injection 0-9 Units  0-9 Units Subcutaneous TID AC Sarai Morgan APRN   7 Units at 09/09/20 1219   • nitroglycerin (NITROSTAT) SL tablet 0.4 mg  0.4 mg Sublingual Q5 Min PRN Sarai Morgan APRN       • ondansetron (ZOFRAN) injection 4 mg  4 mg Intravenous Q6H PRN Sarai Morgan APRN   4 mg at 09/06/20 0137   • polyethylene glycol (MIRALAX) packet 17 g  17 g Oral Daily Douglas Paredes MD   17 g at 09/09/20 0911   • sennosides-docusate (PERICOLACE) 8.6-50 MG per tablet 1 tablet  1 tablet Oral Nightly Douglas Paredes MD   1 tablet at 09/08/20 2226       ASSESSMENT:  1. ton, a bit worse today.  Previous baseline creatinine 1.3  2. ckd III, baseline creatinine 1.3  3. Hyperkalemia, improved  4. T2DM  5. Hypertension, above goal  6. Metabolic acidosis    Plan:  Renal function improved, near baseline  ?  Accuracy of today's weight  Renal ultrasound and urine studies unremarkable for secondary etiology of his ARF  Stop IV fluids  Continue amlodipine for hypertension.  Increase hydralazine  Follow-up a.m. labs.  Discharge pending rehab placement      Osmin Fan MD   [facility]   Office phone number: [phone]  Answering service phone number: [phone]    09/09/20  12:34    Dictation performed using Dragon dictation software

## 2020-09-09 NOTE — PROGRESS NOTES
Continued Stay Note  [facility]     Patient Name: Courtney Medrano  MRN: [id number]  Today's Date: 9/9/2020    Admit Date: 9/5/2020    Discharge Plan     Row Name 09/09/20 1123       Plan    Plan  Plan is skilled bed at [facility] PENDING precert.     Plan Comments  S/W [facility]/ [facility].  CCP requested that they initiate precert.         Discharge Codes    No documentation.             Jenise Flores RN

## 2020-09-09 NOTE — THERAPY TREATMENT NOTE
Patient Name: Courtney Medrano  : 1940    MRN: [id number]                              Today's Date: 2020       Admit Date: 2020    Visit Dx:     ICD-10-CM ICD-9-CM   1. Generalized weakness R53.1 780.79   2. Acute renal insufficiency N28.9 593.9   3. Hyperkalemia E87.5 276.7     Patient Active Problem List   Diagnosis   • Anxiety disorder   • Recurrent major depressive disorder, in full remission (CMS/[facility])   • Constipation   • Diabetes type 2, uncontrolled (CMS/[facility])   • GERD (gastroesophageal reflux disease)   • Hyperlipidemia   • Low back pain   • Prostatitis   • Pulmonary nodule   • Diabetic peripheral neuropathy (CMS/[facility])   • Chronic fatigue   • Vitamin D deficiency   • Noncompliance of patient with dietary regimen   • Uncontrolled type 2 diabetes mellitus (CMS/[facility])   • Constipation   • Leg pain   • Benign essential HTN   • Noncompliance with medications   • Perirectal abscess   • Anxiety   • CKD (chronic kidney disease) stage 3, GFR 30-59 ml/min (CMS/[facility])   • Anemia of chronic disease   • Low hemoglobin   • PAD (peripheral artery disease) (CMS/[facility])   • Polyp of colon   • Pyelonephritis   • Hematuria   • Tobacco use   • Type 2 diabetes mellitus with diabetic chronic kidney disease (CMS/[facility])   • Contusion of rib on right side   • Weight loss   • Generalized weakness   • Hyperkalemia   • TIAGO (acute kidney injury) (CMS/[facility])   • Current chronic use of systemic steroids   • Failure to thrive in adult   • Chronic anticoagulation     Past Medical History:   Diagnosis Date   • Type 2 diabetes mellitus (CMS/[facility])    • Vitamin D deficiency      Past Surgical History:   Procedure Laterality Date   • BACK SURGERY     • CYSTOSCOPY W/ URETERAL STENT PLACEMENT Left 2020    Procedure: CYSTOSCOPY URETERAL CATHETER/STENT INSERTION;  Surgeon: Clark Ramirez MD;  Location: [facility];  Service: Urology   • KIDNEY STONE SURGERY     • URINARY SURGERY      stent placement     General Information     Row  Name 09/09/20 1457          PT Evaluation Time/Intention    Document Type  therapy note (daily note)  -MS     Mode of Treatment  physical therapy;individual therapy  -MS     Row Name 09/09/20 1457          General Information    Existing Precautions/Restrictions  (S) fall Exit alarm   -MS     Row Name 09/09/20 1457          Cognitive Assessment/Intervention- PT/OT    Orientation Status (Cognition)  person;oriented to  -MS     Row Name 09/09/20 1457          Safety Issues, Functional Mobility    Comment, Safety Issues/Impairments (Mobility)  Gait belt used for safety.   -MS       User Key  (r) = Recorded By, (t) = Taken By, (c) = Cosigned By    Initials Name Provider Type    MS AzulBalaji, PT Physical Therapist        Mobility     Row Name 09/09/20 1457          Bed Mobility Assessment/Treatment    Supine-Sit [place] (Bed Mobility)  contact guard  -MS     Sit-Supine [place] (Bed Mobility)  contact guard  -MS     Row Name 09/09/20 1457          Sit-Stand Transfer    Sit-Stand [place] (Transfers)  minimum assist (75% patient effort)  -MS     Assistive Device (Sit-Stand Transfers)  walker, front-wheeled  -MS     Row Name 09/09/20 1457          Gait/Stairs Assessment/Training    [place] Level (Gait)  contact guard  -MS     Assistive Device (Gait)  walker, front-wheeled  -MS     Distance in Feet (Gait)  70 feet  -MS     Pattern (Gait)  step-through  -MS     Deviations/Abnormal Patterns (Gait)  demian decreased;stride length decreased  -MS     Bilateral Gait Deviations  forward flexed posture  -MS     Comment (Gait/Stairs)  Verbal/tactile cues for posture correction and to increase his bilateral step length.   -MS       User Key  (r) = Recorded By, (t) = Taken By, (c) = Cosigned By    Initials Name Provider Type    MS TorresAzulBalaji, PT Physical Therapist        Obj/Interventions     Row Name 09/09/20 1458          Therapeutic Exercise    Comment (Therapeutic Exercise)  BLE ther. ex. program  x 10 reps completed (Ankle Pumps, Hip Flexion, LAQ's)  -MS       User Key  (r) = Recorded By, (t) = Taken By, (c) = Cosigned By    Initials Name Provider Type    Balaji King, PT Physical Therapist        Goals/Plan    No documentation.       Clinical Impression     Row Name 09/09/20 1459          Pain Scale: Numbers Pre/Post-Treatment    Pain Scale: Numbers, Pretreatment  0/10 - no pain  -MS     Pain Scale: Numbers, Post-Treatment  0/10 - no pain  -MS     Row Name 09/09/20 1459          Positioning and Restraints    Pre-Treatment Position  in bed  -MS     Post Treatment Position  bed  -MS     In Bed  notified nsg;supine;call light within reach;encouraged to call for assist;exit alarm on All lines intact.   -MS       User Key  (r) = Recorded By, (t) = Taken By, (c) = Cosigned By    Initials Name Provider Type    Balaji King, PT Physical Therapist        Outcome Measures     Row Name 09/09/20 1459          How much help from another person do you currently need...    Turning from your back to your side while in flat bed without using bedrails?  3  -MS     Moving from lying on back to sitting on the side of a flat bed without bedrails?  3  -MS     Moving to and from a bed to a chair (including a wheelchair)?  3  -MS     Standing up from a chair using your arms (e.g., wheelchair, bedside chair)?  3  -MS     Climbing 3-5 steps with a railing?  2  -MS     To walk in hospital room?  3  -MS     AM-PAC 6 Clicks Score (PT)  17  -MS     Row Name 09/09/20 1459          Functional Assessment    Outcome Measure Options  AM-PAC 6 Clicks Basic Mobility (PT)  -MS       User Key  (r) = Recorded By, (t) = Taken By, (c) = Cosigned By    Initials Name Provider Type    Balaji King, PT Physical Therapist        Physical Therapy Education                 Title: PT OT SLP Therapies (Done)     Topic: Physical Therapy (Done)     Point: Mobility training (Done)     Description:   Instruct learner(s) on safety and  technique for assisting patient out of bed, chair or wheelchair.  Instruct in the proper use of assistive devices, such as walker, crutches, cane or brace.              Patient Friendly Description:   It's important to get you on your feet again, but we need to do so in a way that is safe for you. Falling has serious consequences, and your personal safety is the most important thing of all.        When it's time to get out of bed, one of us or a family member will sit next to you on the bed to give you support.     If your doctor or nurse tells you to use a walker, crutches, a cane, or a brace, be sure you use it every time you get out of bed, even if you think you don't need it.    Learning Progress Summary           Patient Acceptance, E,D, VU,NR by MS at 9/9/2020 1459    Acceptance, E,D, VU,NR by MS at 9/8/2020 1104    Acceptance, E,TB,D, VU,NR by  at 9/7/2020 1510    Acceptance, E, NR by  at 9/6/2020 1604    Acceptance, E, VU by AS at 9/6/2020 0451                   Point: Home exercise program (Done)     Description:   Instruct learner(s) on appropriate technique for monitoring, assisting and/or progressing patient with therapeutic exercises and activities.              Learning Progress Summary           Patient Acceptance, E,D, VU,NR by MS at 9/9/2020 1459    Acceptance, E,D, VU,NR by MS at 9/8/2020 1104    Acceptance, E,TB,D, VU,NR by  at 9/7/2020 1510    Acceptance, E, VU by AS at 9/6/2020 0451                   Point: Body mechanics (Done)     Description:   Instruct learner(s) on proper positioning and spine alignment for patient and/or caregiver during mobility tasks and/or exercises.              Learning Progress Summary           Patient Acceptance, E,D, VU,NR by MS at 9/9/2020 1459    Acceptance, E,D, VU,NR by MS at 9/8/2020 1104    Acceptance, E,TB,D, VU,NR by  at 9/7/2020 1510    Acceptance, E, VU by AS at 9/6/2020 0451                   Point: Precautions (Done)     Description:   Instruct  learner(s) on prescribed precautions during mobility and gait tasks              Learning Progress Summary           Patient Acceptance, E,D, VU,NR by MS at 9/9/2020 1459    Acceptance, E,D, VU,NR by MS at 9/8/2020 1104    Acceptance, E,TB,D, VU,NR by  at 9/7/2020 1510    Acceptance, E, VU by AS at 9/6/2020 0451                               User Key     Initials Effective Dates Name Provider Type Discipline    EM 04/03/18 -  Brenna Slater, PT Physical Therapist PT    MS 04/03/18 -  Balaji Azul, PT Physical Therapist PT     03/07/18 -  Charlene Reid PTA Physical Therapy Assistant PT    AS 08/10/20 -  Selwyn Lopez RN Registered Nurse Nurse              PT Recommendation and Plan     Plan of Care Reviewed With: patient  Outcome Summary: Pt. able to ambulate 70 feet, CGA x 1, with use of RWx this date. Pt. requires CGA x1 for bed mobility and Min. assist x 1 for sit <-> stand transfers. BLE ther. ex. program x 10 reps completed for general strengthening. Verbal/tactile cues for posture correction and to increase his bilateral step length during ambulation.     Time Calculation:   PT Charges     Row Name 09/09/20 1501             Time Calculation    Start Time  1328  -MS      Stop Time  1353  -MS      Time Calculation (min)  25 min  -MS      PT Received On  09/09/20  -MS      PT - Next Appointment  09/10/20  -MS         Time Calculation- PT    Total Timed Code Minutes- PT  23 minute(s)  -MS        User Key  (r) = Recorded By, (t) = Taken By, (c) = Cosigned By    Initials Name Provider Type    MS Azul Balaji GARDNER, PT Physical Therapist        Therapy Charges for Today     Code Description Service Date Service Provider Modifiers Qty    61839382868 HC PT THER PROC EA 15 MIN 9/8/2020 Balaji Azul, PT GP 1    98573348051 HC PT THER PROC EA 15 MIN 9/9/2020 Balaji Azul, PT GP 2          PT G-Codes  Outcome Measure Options: AM-PAC 6 Clicks Basic Mobility (PT)  AM-PAC 6 Clicks Score (PT):  17    Balaji Azul, PT  9/9/2020

## 2020-09-09 NOTE — PROGRESS NOTES
Name: Courtney Medrano ADMIT: 2020   : 1940  PCP: Shantell Alvarez PA-C    MRN: [id number] LOS: 4 days   AGE/SEX: 80 y.o. male  ROOM: [facility]     Subjective   Subjective   Much more alert today.  Sitting up in bed eating dinner.  States it is the worst for she has ever had.  Has been out of bed today with therapy.  No new complaints otherwise.  Seems better oriented today.      Review of Systems        Objective   Objective   Vital Signs  Temp:  [98.6 °F (37 °C)-99.1 °F (37.3 °C)] 99.1 °F (37.3 °C)  Heart Rate:  [79-91] 91  Resp:  [16-18] 16  BP: (165-184)/(82-95) 165/82  SpO2:  [99 %] 99 %  on   ;   Device (Oxygen Therapy): room air  Body mass index is 18.67 kg/m².  Physical Exam   Constitutional: He is cooperative. He has a sickly appearance. No distress.   Neck: No JVD present. No tracheal deviation present.   Cardiovascular: Normal rate and regular rhythm.   No murmur heard.  Pulmonary/Chest: Effort normal and breath sounds normal. No respiratory distress.   Abdominal: Soft. Normal appearance and bowel sounds are normal. He exhibits no distension. There is no tenderness.   Musculoskeletal: He exhibits no edema.   Neurological: He is alert.   Skin: Skin is warm and dry.   Psychiatric: He has a normal mood and affect. His behavior is normal. Cognition and memory are impaired.   Nursing note and vitals reviewed.      Results Review:       I reviewed the patient's new clinical results.  Results from last 7 days   Lab Units 20  0540 20  0611 20  0704 20  2039   WBC 10*3/mm3 6.44 6.91 7.49 8.25   HEMOGLOBIN g/dL 10.2* 9.8* 9.6* 10.6*   PLATELETS 10*3/mm3 199 210 220 264     Results from last 7 days   Lab Units 20  0536 20  0540 20  0611 20  1427   SODIUM mmol/L 139 136 133* 130*   POTASSIUM mmol/L 3.6 3.7 4.2 5.6*   CHLORIDE mmol/L 109* 107 106 105   CO2 mmol/L 19.1* 19.3* 18.9* 16.7*   BUN mg/dL 27* 32* 29* 35*   CREATININE mg/dL 1.67* 2.01* 1.83* 2.23*      GLUCOSE mg/dL 114* 189* 190* 163*   Estimated Creatinine Clearance: 27.9 mL/min (A) (by C-G formula based on SCr of 1.67 mg/dL (H)).  Results from last 7 days   Lab Units 09/09/20  0536 09/05/20  2039   ALBUMIN g/dL 3.90 4.30   BILIRUBIN mg/dL  --  0.2   ALK PHOS U/L  --  63   AST (SGOT) U/L  --  12   ALT (SGPT) U/L  --  7     Results from last 7 days   Lab Units 09/09/20  0536 09/08/20  0540 09/07/20  0611 09/06/20  1427  09/05/20 2039   CALCIUM mg/dL 8.7 8.0* 7.8* 8.2*   < > 9.5   ALBUMIN g/dL 3.90  --   --   --   --  4.30   MAGNESIUM mg/dL  --   --  2.0  --   --   --    PHOSPHORUS mg/dL 3.3  --   --   --   --   --     < > = values in this interval not displayed.       SARS-CoV-2 PCR   Date Value Ref Range Status   09/05/2020 Not Detected  Final     Glucose   Date/Time Value Ref Range Status   09/09/2020 1644 343 (H) 70 - 130 mg/dL Final   09/09/2020 1146 343 (H) 70 - 130 mg/dL Final   09/09/2020 0554 84 70 - 130 mg/dL Final   09/08/2020 2107 311 (H) 70 - 130 mg/dL Final   09/08/2020 1743 350 (H) 70 - 130 mg/dL Final   09/08/2020 1136 282 (H) 70 - 130 mg/dL Final   09/08/2020 0611 179 (H) 70 - 130 mg/dL Final       US Renal Bilateral  Narrative: US RENAL BILATERAL-     INDICATIONS: Acute renal failure     TECHNIQUE: ULTRASOUND OF THE KIDNEYS AND URINARY BLADDER.     COMPARISON: CT from 01/11/2020     FINDINGS:     The right kidney measures 11.0 centimeters, the left kidney measures  11.2 centimeters.     No renal lesion is identified. Mild left upper pole caliectasis. No  ziyad hydronephrosis. Echogenic nonobstructive stones are apparent in  each kidney. Trace right perinephric fluid.     The prostate is enlarged, 5.6 cm, PSA correlation recommended as  indicated. Prominent prostatic impression on the urinary bladder appears  grossly similar to the prior CT exam The urinary bladder otherwise  appears unremarkable. Bilateral ureteral jets were observed.     Impression: Mild left upper pole caliectasis. No ziyad  hydronephrosis.  Nonobstructive stones are apparent in each kidney. Enlarged prostate  with prominent prostatic impression on the urinary bladder.     This report was finalized on 9/8/2020 5:52 PM by Dr. Jorge L Alex M.D.           amLODIPine 5 mg Oral Q24H   apixaban 2.5 mg Oral Q12H   dexamethasone 2 mg Oral Daily With Breakfast   hydrALAZINE 50 mg Oral Q8H   insulin glargine 10 Units Subcutaneous Nightly   insulin lispro 0-9 Units Subcutaneous TID AC   polyethylene glycol 17 g Oral Daily   senna-docusate sodium 1 tablet Oral Nightly      Diet Regular; Cardiac, Consistent Carbohydrate       Assessment/Plan     Active Hospital Problems    Diagnosis  POA   • **TIAGO (acute kidney injury) (CMS/[facility]) [N17.9]  Yes   • Current chronic use of systemic steroids [Z79.52]  Not Applicable   • Failure to thrive in adult [R62.7]  Yes   • Chronic anticoagulation [Z79.01]  Not Applicable   • Generalized weakness [R53.1]  Yes   • Hyperkalemia [E87.5]  Yes   • Type 2 diabetes mellitus with diabetic chronic kidney disease (CMS/[facility]) [E11.22]  Yes   • Tobacco use [Z72.0]  Yes   • CKD (chronic kidney disease) stage 3, GFR 30-59 ml/min (CMS/[facility]) [N18.3]  Yes   • Anemia of chronic disease [D63.8]  Yes   • Benign essential HTN [I10]  Yes   • Constipation [K59.00]  Yes   • Vitamin D deficiency [E55.9]  Yes   • Diabetic peripheral neuropathy (CMS/[facility]) [E11.42]  Yes   • Chronic fatigue [R53.82]  Yes   • Hyperlipidemia [E78.5]  Yes      Resolved Hospital Problems   No resolved problems to display.       80 y.o. male admitted with acute kidney injury and failure to thrive.  This has been going on for several weeks having just spent 3 weeks in a skilled nursing facility.  Remains hospitalized over renal issues.    Renal function is somewhat better today.  Appreciate nephrology assistance.  Blood sugars okay in the morning but high throughout the day.  Will continue current dose Lantus and add mealtime insulin.  Evidently he has family  members bringing him donuts.  Continues to have failure to thrive and will require NH placement.    · Eliquis (home med) for DVT prophylaxis.  · Full code.  · Discussed with patient, family, nursing staff and care team on multidisciplinary rounds.  · Anticipate discharge to SNU facility Friday.  Repeat COVID test ordered.  Awaiting precert.      Ramakrishna Orellana MD  [facility]  09/09/20  17:11    Patient was placed in face mask on first look.  I wore protective equipment throughout this patient encounter including a face mask, gloves and protective eyewear.  Hand hygiene was performed before donning protective equipment and after removal when leaving the room.

## 2020-09-09 NOTE — PLAN OF CARE
Problem: Patient Care Overview  Goal: Plan of Care Review  Flowsheets (Taken 9/9/2020 1500)  Plan of Care Reviewed With: patient  Outcome Summary: Pt. able to ambulate 70 feet, CGA x 1, with use of RWx this date. Pt. requires CGA x1 for bed mobility and Min. assist x 1 for sit <-> stand transfers. BLE ther. ex. program x 10 reps completed for general strengthening. Verbal/tactile cues for posture correction and to increase his bilateral step length during ambulation.   Patient was wearing a face mask during this therapy encounter. Therapist used appropriate personal protective equipment including eye protection, mask, and gloves.  Mask used was standard procedure mask. Appropriate PPE was worn during the entire therapy session. Hand hygiene was completed before and after therapy session. Patient is not in enhanced droplet precautions.

## 2020-09-10 LAB
ALBUMIN SERPL-MCNC: 3.8 G/DL (ref 3.5–5.2)
ANION GAP SERPL CALCULATED.3IONS-SCNC: 10.2 MMOL/L (ref 5–15)
BUN SERPL-MCNC: 40 MG/DL (ref 8–23)
BUN/CREAT SERPL: 23 (ref 7–25)
CALCIUM SPEC-SCNC: 8.4 MG/DL (ref 8.6–10.5)
CHLORIDE SERPL-SCNC: 109 MMOL/L (ref 98–107)
CO2 SERPL-SCNC: 17.8 MMOL/L (ref 22–29)
CREAT SERPL-MCNC: 1.74 MG/DL (ref 0.76–1.27)
GFR SERPL CREATININE-BSD FRML MDRD: 38 ML/MIN/1.73
GLUCOSE BLDC GLUCOMTR-MCNC: 113 MG/DL (ref 70–130)
GLUCOSE BLDC GLUCOMTR-MCNC: 227 MG/DL (ref 70–130)
GLUCOSE BLDC GLUCOMTR-MCNC: 285 MG/DL (ref 70–130)
GLUCOSE BLDC GLUCOMTR-MCNC: 310 MG/DL (ref 70–130)
GLUCOSE SERPL-MCNC: 302 MG/DL (ref 65–99)
PHOSPHATE SERPL-MCNC: 4.2 MG/DL (ref 2.5–4.5)
POTASSIUM SERPL-SCNC: 4 MMOL/L (ref 3.5–5.2)
SARS-COV-2 RNA RESP QL NAA+PROBE: NOT DETECTED
SODIUM SERPL-SCNC: 137 MMOL/L (ref 136–145)

## 2020-09-10 PROCEDURE — 97110 THERAPEUTIC EXERCISES: CPT

## 2020-09-10 PROCEDURE — 63710000001 INSULIN LISPRO (HUMAN) PER 5 UNITS: Performed by: HOSPITALIST

## 2020-09-10 PROCEDURE — 80069 RENAL FUNCTION PANEL: CPT | Performed by: INTERNAL MEDICINE

## 2020-09-10 PROCEDURE — 63710000001 INSULIN LISPRO (HUMAN) PER 5 UNITS: Performed by: NURSE PRACTITIONER

## 2020-09-10 PROCEDURE — 63710000001 INSULIN GLARGINE PER 5 UNITS: Performed by: HOSPITALIST

## 2020-09-10 PROCEDURE — 82962 GLUCOSE BLOOD TEST: CPT

## 2020-09-10 RX ORDER — ARIPIPRAZOLE 2 MG/1
2 TABLET ORAL NIGHTLY
Status: DISCONTINUED | OUTPATIENT
Start: 2020-09-10 | End: 2020-09-11 | Stop reason: HOSPADM

## 2020-09-10 RX ORDER — AMLODIPINE BESYLATE 10 MG/1
10 TABLET ORAL
Status: DISCONTINUED | OUTPATIENT
Start: 2020-09-11 | End: 2020-09-11 | Stop reason: HOSPADM

## 2020-09-10 RX ORDER — DEXAMETHASONE 0.5 MG/1
1 TABLET ORAL
Status: DISCONTINUED | OUTPATIENT
Start: 2020-09-11 | End: 2020-09-11 | Stop reason: HOSPADM

## 2020-09-10 RX ORDER — AMLODIPINE BESYLATE 5 MG/1
5 TABLET ORAL ONCE
Status: DISCONTINUED | OUTPATIENT
Start: 2020-09-10 | End: 2020-09-11 | Stop reason: HOSPADM

## 2020-09-10 RX ORDER — TAMSULOSIN HYDROCHLORIDE 0.4 MG/1
0.4 CAPSULE ORAL DAILY
Status: DISCONTINUED | OUTPATIENT
Start: 2020-09-10 | End: 2020-09-11 | Stop reason: HOSPADM

## 2020-09-10 RX ADMIN — AMLODIPINE BESYLATE 5 MG: 5 TABLET ORAL at 08:41

## 2020-09-10 RX ADMIN — INSULIN LISPRO 4 UNITS: 100 INJECTION, SOLUTION INTRAVENOUS; SUBCUTANEOUS at 17:32

## 2020-09-10 RX ADMIN — DEXAMETHASONE 2 MG: 2 TABLET ORAL at 08:41

## 2020-09-10 RX ADMIN — INSULIN LISPRO 5 UNITS: 100 INJECTION, SOLUTION INTRAVENOUS; SUBCUTANEOUS at 17:32

## 2020-09-10 RX ADMIN — APIXABAN 2.5 MG: 2.5 TABLET, FILM COATED ORAL at 08:41

## 2020-09-10 RX ADMIN — APIXABAN 2.5 MG: 2.5 TABLET, FILM COATED ORAL at 21:41

## 2020-09-10 RX ADMIN — HYDRALAZINE HYDROCHLORIDE 50 MG: 50 TABLET, FILM COATED ORAL at 13:49

## 2020-09-10 RX ADMIN — INSULIN LISPRO 5 UNITS: 100 INJECTION, SOLUTION INTRAVENOUS; SUBCUTANEOUS at 08:42

## 2020-09-10 RX ADMIN — INSULIN LISPRO 5 UNITS: 100 INJECTION, SOLUTION INTRAVENOUS; SUBCUTANEOUS at 13:49

## 2020-09-10 RX ADMIN — INSULIN GLARGINE 10 UNITS: 100 INJECTION, SOLUTION SUBCUTANEOUS at 21:41

## 2020-09-10 RX ADMIN — POLYETHYLENE GLYCOL 3350 17 G: 17 POWDER, FOR SOLUTION ORAL at 08:41

## 2020-09-10 RX ADMIN — HYDRALAZINE HYDROCHLORIDE 50 MG: 50 TABLET, FILM COATED ORAL at 06:05

## 2020-09-10 RX ADMIN — ARIPIPRAZOLE 2 MG: 2 TABLET ORAL at 21:41

## 2020-09-10 RX ADMIN — HYDRALAZINE HYDROCHLORIDE 50 MG: 50 TABLET, FILM COATED ORAL at 21:41

## 2020-09-10 RX ADMIN — INSULIN LISPRO 6 UNITS: 100 INJECTION, SOLUTION INTRAVENOUS; SUBCUTANEOUS at 08:56

## 2020-09-10 RX ADMIN — DOCUSATE SODIUM 50MG AND SENNOSIDES 8.6MG 1 TABLET: 8.6; 5 TABLET, FILM COATED ORAL at 21:41

## 2020-09-10 NOTE — THERAPY TREATMENT NOTE
Patient Name: Courtney Medrano  : 1940    MRN: [id number]                              Today's Date: 9/10/2020       Admit Date: 2020    Visit Dx:     ICD-10-CM ICD-9-CM   1. Generalized weakness R53.1 780.79   2. Acute renal insufficiency N28.9 593.9   3. Hyperkalemia E87.5 276.7     Patient Active Problem List   Diagnosis   • Anxiety disorder   • Recurrent major depressive disorder, in full remission (CMS/[facility])   • Constipation   • Diabetes type 2, uncontrolled (CMS/[facility])   • GERD (gastroesophageal reflux disease)   • Hyperlipidemia   • Low back pain   • Prostatitis   • Pulmonary nodule   • Diabetic peripheral neuropathy (CMS/[facility])   • Chronic fatigue   • Vitamin D deficiency   • Noncompliance of patient with dietary regimen   • Uncontrolled type 2 diabetes mellitus (CMS/[facility])   • Constipation   • Leg pain   • Benign essential HTN   • Noncompliance with medications   • Perirectal abscess   • Anxiety   • CKD (chronic kidney disease) stage 3, GFR 30-59 ml/min (CMS/[facility])   • Anemia of chronic disease   • Low hemoglobin   • PAD (peripheral artery disease) (CMS/[facility])   • Polyp of colon   • Pyelonephritis   • Hematuria   • Tobacco use   • Type 2 diabetes mellitus with diabetic chronic kidney disease (CMS/[facility])   • Contusion of rib on right side   • Weight loss   • Generalized weakness   • Hyperkalemia   • TIAGO (acute kidney injury) (CMS/[facility])   • Current chronic use of systemic steroids   • Failure to thrive in adult   • Chronic anticoagulation     Past Medical History:   Diagnosis Date   • Type 2 diabetes mellitus (CMS/[facility])    • Vitamin D deficiency      Past Surgical History:   Procedure Laterality Date   • BACK SURGERY     • CYSTOSCOPY W/ URETERAL STENT PLACEMENT Left 2020    Procedure: CYSTOSCOPY URETERAL CATHETER/STENT INSERTION;  Surgeon: Clark Ramirez MD;  Location: [facility];  Service: Urology   • KIDNEY STONE SURGERY     • URINARY SURGERY      stent placement     General Information     Row  Name 09/10/20 1015          PT Evaluation Time/Intention    Document Type  therapy note (daily note)  -DJ     Mode of Treatment  physical therapy;individual therapy  -DJ     Row Name 09/10/20 1015          General Information    Patient Profile Reviewed?  yes  -DJ     Existing Precautions/Restrictions  fall  -DJ     Barriers to Rehab  previous functional deficit  -DJ     Row Name 09/10/20 1015          Cognitive Assessment/Intervention- PT/OT    Orientation Status (Cognition)  oriented to;person  -DJ     Cognitive Assessment/Intervention Comment  Pleasant and cooperative  -DJ     Row Name 09/10/20 1015          Safety Issues, Functional Mobility    Comment, Safety Issues/Impairments (Mobility)  gt belt, grippy socks  -DJ       User Key  (r) = Recorded By, (t) = Taken By, (c) = Cosigned By    Initials Name Provider Type    DJ aPyal Terry, PT Physical Therapist        Mobility     Row Name 09/10/20 1016          Bed Mobility Assessment/Treatment    Bed Mobility Assessment/Treatment  supine-sit;sit-supine  -DJ     Supine-Sit [place] (Bed Mobility)  contact guard;verbal cues  -DJ     Sit-Supine [place] (Bed Mobility)  contact guard;verbal cues  -DJ     Assistive Device (Bed Mobility)  bed rails;head of bed elevated  -DJ     Comment (Bed Mobility)  Pt moves very slowly and req constant vc to stay focused on task; pt dem consistent post and R lat lean while sitting EOB. Req freq vc to correct sitting posture  -DJ     Row Name 09/10/20 1016          Transfer Assessment/Treatment    Comment (Transfers)  sit/stand from EOB x 2  -DJ     Row Name 09/10/20 1016          Bed-Chair Transfer    Bed-Chair [place] (Transfers)  not tested  -DJ     Row Name 09/10/20 1016          Sit-Stand Transfer    Sit-Stand [place] (Transfers)  contact guard;verbal cues  -DJ     Assistive Device (Sit-Stand Transfers)  walker, front-wheeled  -DJ     Row Name 09/10/20 1016          Gait/Stairs Assessment/Training    Gait/Stairs  Assessment/Training  gait/ambulation assistive device;distance ambulated;gait pattern;gait deviations  -DJ     [place] Level (Gait)  contact guard;verbal cues  -DJ     Assistive Device (Gait)  walker, front-wheeled  -DJ     Distance in Feet (Gait)  70'  -DJ     Pattern (Gait)  step-to  -DJ     Deviations/Abnormal Patterns (Gait)  demian decreased;festinating/shuffling;gait speed decreased;stride length decreased  -DJ     Bilateral Gait Deviations  forward flexed posture  -DJ     [place] Level (Stairs)  not tested  -DJ     Comment (Gait/Stairs)  Pt amb 70' with r wx and CGA with constant vc to increase step length. Pace is VERY SLOW, posture is flexed, step length is sig decreased, balance is fair with wx, endurance is poor.   -DJ       User Key  (r) = Recorded By, (t) = Taken By, (c) = Cosigned By    Initials Name Provider Type    Payal Calhoun, PT Physical Therapist        Obj/Interventions     Row Name 09/10/20 1020          Therapeutic Exercise    Sets/Reps (Therapeutic Exercise)  10x  -DJ     Comment (Therapeutic Exercise)  AP, LAQ, seated marching   -DJ     Row Name 09/10/20 1020          Static Sitting Balance    Level of [place] (Unsupported Sitting, Static Balance)  supervision  -DJ     Sitting Position (Unsupported Sitting, Static Balance)  sitting on edge of bed  -DJ     Time Able to Maintain Position (Unsupported Sitting, Static Balance)  1 to 2 minutes  -DJ     Comment (Unsupported Sitting, Static Balance)  R lat and post lean  -DJ     Row Name 09/10/20 1020          Dynamic Sitting Balance    Level of [place], Reaches Outside Midline (Sitting, Dynamic Balance)  contact guard assist  -DJ     Sitting Position, Reaches Outside Midline (Sitting, Dynamic Balance)  sitting on edge of bed  -DJ     Comment, Reaches Outside Midline (Sitting, Dynamic Balance)  Pt req freq  vc to correct posture while performing seated HEP  -DJ       User Key  (r) = Recorded By, (t) = Taken By, (c) =  Cosigned By    Initials Name Provider Type    DJ Payal Terry, PT Physical Therapist        Goals/Plan    No documentation.       Clinical Impression     Row Name 09/10/20 1021          Pain Assessment    Additional Documentation  Pain Scale: Numbers Pre/Post-Treatment (Group)  -     Row Name 09/10/20 1021          Pain Scale: Numbers Pre/Post-Treatment    Pre/Post Treatment Pain Comment  No c/o pain today other than tightness in legs when initially walking. States it feels good to get up and walk  -DJ     Pain Intervention(s)  Ambulation/increased activity  -DJ     Row Name 09/10/20 1021          Plan of Care Review    Plan of Care Reviewed With  patient  -DJ     Progress  no change  -DJ     Outcome Summary  Pt req CGA for bed mobility and transfers today. He dem a consistent posterior and R lat lean while sitting which worsens with seated HEP but heis able to correct with vc. Pt amb 70' with r wx and CONSTANT vc to take bigger steps and to stay focused on task. He req occasional A to maneuver r wx, kacie when turning. Cont PT daily for ther ex, gt/transfer training, bed mobility, balance, HEP progression, and endurance as tolerated to prepare for d/c to facility.   -     Row Name 09/10/20 1021          Physical Therapy Clinical Impression    Criteria for Skilled Interventions Met (PT Clinical Impression)  yes;treatment indicated  -     Row Name 09/10/20 1021          Vital Signs    O2 Delivery Pre Treatment  room air  -DJ     O2 Delivery Intra Treatment  room air  -DJ     O2 Delivery Post Treatment  room air  -DJ     Pre Patient Position  Supine  -DJ     Intra Patient Position  Standing  -DJ     Post Patient Position  Supine  -DJ     Row Name 09/10/20 1021          Positioning and Restraints    Pre-Treatment Position  in bed  -DJ     Post Treatment Position  bed  -DJ     In Bed  supine;call light within reach;encouraged to call for assist;exit alarm on  -DJ       User Key  (r) = Recorded By, (t) = Taken By,  (c) = Cosigned By    Initials Name Provider Type    Payal Calhoun PT Physical Therapist        Outcome Measures     Row Name 09/10/20 1025          How much help from another person do you currently need...    Turning from your back to your side while in flat bed without using bedrails?  3  -DJ     Moving from lying on back to sitting on the side of a flat bed without bedrails?  3  -DJ     Moving to and from a bed to a chair (including a wheelchair)?  3  -DJ     Standing up from a chair using your arms (e.g., wheelchair, bedside chair)?  3  -DJ     Climbing 3-5 steps with a railing?  2  -DJ     To walk in hospital room?  3  -DJ     AM-PAC 6 Clicks Score (PT)  17  -DJ     Row Name 09/10/20 1025          Functional Assessment    Outcome Measure Options  AM-PAC 6 Clicks Basic Mobility (PT)  -DJ       User Key  (r) = Recorded By, (t) = Taken By, (c) = Cosigned By    Initials Name Provider Type    Payal Calhoun PT Physical Therapist        Physical Therapy Education                 Title: PT OT SLP Therapies (Done)     Topic: Physical Therapy (Done)     Point: Mobility training (Done)     Description:   Instruct learner(s) on safety and technique for assisting patient out of bed, chair or wheelchair.  Instruct in the proper use of assistive devices, such as walker, crutches, cane or brace.              Patient Friendly Description:   It's important to get you on your feet again, but we need to do so in a way that is safe for you. Falling has serious consequences, and your personal safety is the most important thing of all.        When it's time to get out of bed, one of us or a family member will sit next to you on the bed to give you support.     If your doctor or nurse tells you to use a walker, crutches, a cane, or a brace, be sure you use it every time you get out of bed, even if you think you don't need it.    Learning Progress Summary           Patient Acceptance, E,TB, VU,NR by JESSICA at 9/10/2020 1028     Acceptance, E,D, VU,NR by MS at 9/9/2020 1459    Acceptance, E,D, VU,NR by MS at 9/8/2020 1104    Acceptance, E,TB,D, VU,NR by SM at 9/7/2020 1510    Acceptance, E, NR by EM at 9/6/2020 1604    Acceptance, E, VU by AS at 9/6/2020 0451                   Point: Home exercise program (Done)     Description:   Instruct learner(s) on appropriate technique for monitoring, assisting and/or progressing patient with therapeutic exercises and activities.              Learning Progress Summary           Patient Acceptance, E,TB, VU,NR by DJ at 9/10/2020 1025    Acceptance, E,D, VU,NR by MS at 9/9/2020 1459    Acceptance, E,D, VU,NR by MS at 9/8/2020 1104    Acceptance, E,TB,D, VU,NR by SM at 9/7/2020 1510    Acceptance, E, VU by AS at 9/6/2020 0451                   Point: Body mechanics (Done)     Description:   Instruct learner(s) on proper positioning and spine alignment for patient and/or caregiver during mobility tasks and/or exercises.              Learning Progress Summary           Patient Acceptance, E,TB, VU,NR by DJ at 9/10/2020 1025    Acceptance, E,D, VU,NR by MS at 9/9/2020 1459    Acceptance, E,D, VU,NR by MS at 9/8/2020 1104    Acceptance, E,TB,D, VU,NR by SM at 9/7/2020 1510    Acceptance, E, VU by AS at 9/6/2020 0451                   Point: Precautions (Done)     Description:   Instruct learner(s) on prescribed precautions during mobility and gait tasks              Learning Progress Summary           Patient Acceptance, E,TB, VU,NR by DJ at 9/10/2020 1025    Acceptance, E,D, VU,NR by MS at 9/9/2020 1459    Acceptance, E,D, VU,NR by MS at 9/8/2020 1104    Acceptance, E,TB,D, VU,NR by  at 9/7/2020 1510    Acceptance, E, VU by AS at 9/6/2020 0451                               User Key     Initials Effective Dates Name Provider Type Discipline    EM 04/03/18 -  Brenna Slater PT Physical Therapist PT    MS 04/03/18 -  Balaji Azul, PT Physical Therapist PT     03/07/18 -  Charlene Reid,  PTA Physical Therapy Assistant PT    DJ 10/25/19 -  Payal Terry PT Physical Therapist PT    AS 08/10/20 -  Selwyn Lopez, RN Registered Nurse Nurse              PT Recommendation and Plan     Outcome Summary/Treatment Plan (PT)  Anticipated Discharge Disposition (PT): home with home health, skilled nursing facility  Plan of Care Reviewed With: patient  Progress: no change  Outcome Summary: Pt req CGA for bed mobility and transfers today. He dem a consistent posterior and R lat lean while sitting which worsens with seated HEP but heis able to correct with vc. Pt amb 70' with r wx and CONSTANT vc to take bigger steps and to stay focused on task. He req occasional A to maneuver r wx, kacie when turning. Cont PT daily for ther ex, gt/transfer training, bed mobility, balance, HEP progression, and endurance as tolerated to prepare for d/c to facility.      Time Calculation:   PT Charges     Row Name 09/10/20 1026             Time Calculation    Start Time  0955  -DJ      Stop Time  1015  -DJ      Time Calculation (min)  20 min  -DJ      PT Non-Billable Time (min)  10 min  -DJ      PT Received On  09/10/20  -DJ      PT - Next Appointment  09/11/20  -DJ        User Key  (r) = Recorded By, (t) = Taken By, (c) = Cosigned By    Initials Name Provider Type    Payal Calhoun PT Physical Therapist        Therapy Charges for Today     Code Description Service Date Service Provider Modifiers Qty    27215149998 HC PT THER PROC EA 15 MIN 9/10/2020 Payal Terry PT GP 1          PT G-Codes  Outcome Measure Options: AM-PAC 6 Clicks Basic Mobility (PT)  AM-PAC 6 Clicks Score (PT): 17    aPyal Terry PT  9/10/2020

## 2020-09-10 NOTE — PROGRESS NOTES
"   LOS: 5 days     Chief Complaint/ Reason for encounter: TIAGO/CKD/BP management  Chief Complaint   Patient presents with   • Weakness - Generalized         Subjective   Patient is doing well today with no new complaints, currently resting  Good appetite with no nausea or vomiting  No shortness of breath chest pain or edema  Voiding well with no dysuria  BP still up today, good urine output, weight is actually down since admission if daily weights are accurate      Medical history reviewed:  History of Present Illness    Subjective    History taken from: Patient and chart    Vital Signs  Temp:  [98.3 °F (36.8 °C)-99.3 °F (37.4 °C)] 98.3 °F (36.8 °C)  Heart Rate:  [76-85] 81  Resp:  [18-20] 18  BP: (144-184)/(40-98) 147/40       Wt Readings from Last 1 Encounters:   09/09/20 0559 54.1 kg (119 lb 3.2 oz)   09/08/20 0408 58.3 kg (128 lb 9.6 oz)   09/07/20 0559 59.3 kg (130 lb 11.2 oz)   09/06/20 1308 55.6 kg (122 lb 8 oz)   09/06/20 0500 57.7 kg (127 lb 3.2 oz)       Objective:  Vital signs: (most recent): Blood pressure 147/40, pulse 81, temperature 98.3 °F (36.8 °C), temperature source Oral, resp. rate 18, height 170.2 cm (67\"), weight 54.1 kg (119 lb 3.2 oz), SpO2 97 %.              Objective:  General Appearance:  Comfortable, fairly well-appearing, in no acute distress and not in pain.  Awake, alert, oriented  HEENT: Mucous membranes moist, no injury, oropharynx clear  Lungs:  Normal effort and normal respiratory rate.  Breath sounds clear to auscultation.  No  respiratory distress.  No rales, decreased breath sounds or rhonchi.    Heart: Normal rate.  Regular rhythm.  S1 normal.  No murmur.   Abdomen: Abdomen is soft.  Bowel sounds are normal, no abdominal tenderness.  There is no rebound or guarding  Extremities: Normal range of motion.  No significant edema of bilateral lower extremities, distal pulses intact  Neurological: No focal motor or sensory deficits, pupils reactive  Skin:  Warm and dry.  No rash or " cyanosis.       Results Review:    Intake/Output:   No intake or output data in the 24 hours ending 09/10/20 8345      DATA:  Radiology and Labs:  The following labs independently reviewed by me. Additional labs ordered for tomorrow a.m.  Interval notes, chart personally reviewed by me.   Old records independently reviewed showing my office notes reviewed, CKD 3, baseline creatinine around 1.3  Discussed with patient at bedside    Risk/ complexity of medical care/ medical decision making moderate complexity    Labs:   Recent Results (from the past 24 hour(s))   POC Glucose Once    Collection Time: 09/09/20  4:44 PM   Result Value Ref Range    Glucose 343 (H) 70 - 130 mg/dL   COVID-19,BIOTAP, NP/OP SWAB IN TRANSPORT MEDIA OR SALINE 24-36 HR TAT - Swab, Nasopharynx    Collection Time: 09/09/20  6:26 PM   Result Value Ref Range    SARS-CoV-2 PCR Not Detected Not Detected   POC Glucose Once    Collection Time: 09/09/20  9:03 PM   Result Value Ref Range    Glucose 312 (H) 70 - 130 mg/dL   Renal Function Panel    Collection Time: 09/10/20  5:12 AM   Result Value Ref Range    Glucose 302 (H) 65 - 99 mg/dL    BUN 40 (H) 8 - 23 mg/dL    Creatinine 1.74 (H) 0.76 - 1.27 mg/dL    Sodium 137 136 - 145 mmol/L    Potassium 4.0 3.5 - 5.2 mmol/L    Chloride 109 (H) 98 - 107 mmol/L    CO2 17.8 (L) 22.0 - 29.0 mmol/L    Calcium 8.4 (L) 8.6 - 10.5 mg/dL    Albumin 3.80 3.50 - 5.20 g/dL    Phosphorus 4.2 2.5 - 4.5 mg/dL    Anion Gap 10.2 5.0 - 15.0 mmol/L    BUN/Creatinine Ratio 23.0 7.0 - 25.0    eGFR Non African Amer 38 (L) >60 mL/min/1.73   POC Glucose Once    Collection Time: 09/10/20  7:02 AM   Result Value Ref Range    Glucose 285 (H) 70 - 130 mg/dL   POC Glucose Once    Collection Time: 09/10/20 10:59 AM   Result Value Ref Range    Glucose 113 70 - 130 mg/dL       Radiology:  Imaging Results (Last 24 Hours)     ** No results found for the last 24 hours. **             Medications have been reviewed:  Current Facility-Administered  Medications   Medication Dose Route Frequency Provider Last Rate Last Dose   • acetaminophen (TYLENOL) tablet 650 mg  650 mg Oral Q4H PRN Sarai Morgan APRN   650 mg at 09/09/20 1740   • [START ON 9/11/2020] amLODIPine (NORVASC) tablet 10 mg  10 mg Oral Q24H Ramakrishna Orellana MD       • amLODIPine (NORVASC) tablet 5 mg  5 mg Oral Once Ramakrishna Orellana MD       • apixaban (ELIQUIS) tablet 2.5 mg  2.5 mg Oral Q12H Douglas Paredes MD   2.5 mg at 09/10/20 0841   • ARIPiprazole (ABILIFY) tablet 2 mg  2 mg Oral Nightly Ramakrishna Orellana MD       • [START ON 9/11/2020] dexamethasone (DECADRON) tablet 1 mg  1 mg Oral Daily With Breakfast Ramakrishna Orellana MD       • dextrose (D50W) 25 g/ 50mL Intravenous Solution 25 g  25 g Intravenous Q15 Min PRN Sarai Morgan APRN       • dextrose (GLUTOSE) oral gel 15 g  15 g Oral Q15 Min PRN Sarai Morgan APRN       • glucagon (human recombinant) (GLUCAGEN DIAGNOSTIC) injection 1 mg  1 mg Subcutaneous PRN Sarai Morgan APRN       • hydrALAZINE (APRESOLINE) tablet 50 mg  50 mg Oral Q8H Osmin Fan MD   50 mg at 09/10/20 1349   • insulin glargine (LANTUS) injection 10 Units  10 Units Subcutaneous Nightly Ramakrishna Orellana MD   10 Units at 09/09/20 2119   • insulin lispro (humaLOG) injection 0-9 Units  0-9 Units Subcutaneous TID AC Sarai Morgan APRN   6 Units at 09/10/20 0856   • insulin lispro (humaLOG) injection 5 Units  5 Units Subcutaneous TID With Meals Ramakrishna Orellana MD   5 Units at 09/10/20 1349   • nitroglycerin (NITROSTAT) SL tablet 0.4 mg  0.4 mg Sublingual Q5 Min PRN Sarai Morgan APRN       • ondansetron (ZOFRAN) injection 4 mg  4 mg Intravenous Q6H PRN Sarai Morgan APRN   4 mg at 09/06/20 0137   • polyethylene glycol (MIRALAX) packet 17 g  17 g Oral Daily Douglas Paredes MD   17 g at 09/10/20 0852   • sennosides-docusate (PERICOLACE) 8.6-50 MG per tablet 1 tablet  1 tablet Oral Nightly Douglas Paredes MD   1 tablet at 09/09/20 7697   • tamsulosin  (FLOMAX) 24 hr capsule 0.4 mg  0.4 mg Oral Daily Ramakrishna Orellana MD           ASSESSMENT:  1. ton, slowly improved, stable today.  Previous baseline creatinine 1.3  2. ckd III, baseline creatinine 1.3  3. Hyperkalemia, improved  4. T2DM  5. Hypertension, improved control  6. Metabolic acidosis      Plan:  Renal function is about the same today, euvolemic on exam  Blood pressure much better controlled with systolic BP now in the 140s  Steroids may be contributing to difficult BP control, BP should improve further as steroids are weaned off  Renal ultrasound and urine studies unremarkable for secondary etiology of his ARF  Continue current BP regimen, follow-up a.m. labs      Osmin Fan MD   [facility]   Office phone number: [phone]  Answering service phone number: [phone]    09/10/20  15:56    Dictation performed using Dragon dictation software

## 2020-09-10 NOTE — PROGRESS NOTES
Name: Courtney Medrano ADMIT: 2020   : 1940  PCP: Shantell Alvarez PA-C    MRN: [id number] LOS: 5 days   AGE/SEX: 80 y.o. male  ROOM: [facility]     Subjective   Subjective   Continues to do well.  Alert and appetite much improved.    Review of Systems     Objective   Objective   Vital Signs  Temp:  [98.3 °F (36.8 °C)-99.3 °F (37.4 °C)] 99.3 °F (37.4 °C)  Heart Rate:  [76-91] 80  Resp:  [16-20] 18  BP: (144-184)/(77-98) 169/92  SpO2:  [97 %-99 %] 97 %  on   ;   Device (Oxygen Therapy): room air  Body mass index is 18.67 kg/m².  Physical Exam   Constitutional: He is cooperative. He has a sickly appearance. No distress.   Neck: No JVD present. No tracheal deviation present.   Cardiovascular: Normal rate and regular rhythm.   No murmur heard.  Pulmonary/Chest: Effort normal and breath sounds normal. No respiratory distress.   Abdominal: Soft. Normal appearance and bowel sounds are normal. He exhibits no distension. There is no tenderness.   Musculoskeletal: He exhibits no edema.   Neurological: He is alert.   Skin: Skin is warm and dry.   Psychiatric: He has a normal mood and affect. His behavior is normal. Cognition and memory are impaired.   Nursing note and vitals reviewed.      Results Review:       I reviewed the patient's new clinical results.  Results from last 7 days   Lab Units 20  0540 20  0611 20  0704 20  2039   WBC 10*3/mm3 6.44 6.91 7.49 8.25   HEMOGLOBIN g/dL 10.2* 9.8* 9.6* 10.6*   PLATELETS 10*3/mm3 199 210 220 264     Results from last 7 days   Lab Units 09/10/20  0512 20  0536 20  0540 20  0611   SODIUM mmol/L 137 139 136 133*   POTASSIUM mmol/L 4.0 3.6 3.7 4.2   CHLORIDE mmol/L 109* 109* 107 106   CO2 mmol/L 17.8* 19.1* 19.3* 18.9*   BUN mg/dL 40* 27* 32* 29*   CREATININE mg/dL 1.74* 1.67* 2.01* 1.83*   GLUCOSE mg/dL 302* 114* 189* 190*   Estimated Creatinine Clearance: 26.8 mL/min (A) (by C-G formula based on SCr of 1.74 mg/dL (H)).  Results from  last 7 days   Lab Units 09/10/20  0512 09/09/20  0536 09/05/20 2039   ALBUMIN g/dL 3.80 3.90 4.30   BILIRUBIN mg/dL  --   --  0.2   ALK PHOS U/L  --   --  63   AST (SGOT) U/L  --   --  12   ALT (SGPT) U/L  --   --  7     Results from last 7 days   Lab Units 09/10/20  0512 09/09/20  0536 09/08/20  0540 09/07/20  0611  09/05/20 2039   CALCIUM mg/dL 8.4* 8.7 8.0* 7.8*   < > 9.5   ALBUMIN g/dL 3.80 3.90  --   --   --  4.30   MAGNESIUM mg/dL  --   --   --  2.0  --   --    PHOSPHORUS mg/dL 4.2 3.3  --   --   --   --     < > = values in this interval not displayed.       SARS-CoV-2 PCR   Date Value Ref Range Status   09/05/2020 Not Detected  Final     Glucose   Date/Time Value Ref Range Status   09/10/2020 1059 113 70 - 130 mg/dL Final   09/10/2020 0702 285 (H) 70 - 130 mg/dL Final   09/09/2020 2103 312 (H) 70 - 130 mg/dL Final   09/09/2020 1644 343 (H) 70 - 130 mg/dL Final   09/09/2020 1146 343 (H) 70 - 130 mg/dL Final   09/09/2020 0554 84 70 - 130 mg/dL Final   09/08/2020 2107 311 (H) 70 - 130 mg/dL Final       US Renal Bilateral  Narrative: US RENAL BILATERAL-     INDICATIONS: Acute renal failure     TECHNIQUE: ULTRASOUND OF THE KIDNEYS AND URINARY BLADDER.     COMPARISON: CT from 01/11/2020     FINDINGS:     The right kidney measures 11.0 centimeters, the left kidney measures  11.2 centimeters.     No renal lesion is identified. Mild left upper pole caliectasis. No  ziyad hydronephrosis. Echogenic nonobstructive stones are apparent in  each kidney. Trace right perinephric fluid.     The prostate is enlarged, 5.6 cm, PSA correlation recommended as  indicated. Prominent prostatic impression on the urinary bladder appears  grossly similar to the prior CT exam The urinary bladder otherwise  appears unremarkable. Bilateral ureteral jets were observed.     Impression: Mild left upper pole caliectasis. No ziyad hydronephrosis.  Nonobstructive stones are apparent in each kidney. Enlarged prostate  with prominent prostatic  impression on the urinary bladder.     This report was finalized on 9/8/2020 5:52 PM by Dr. Jorge L Alex M.D.           [START ON 9/11/2020] amLODIPine 10 mg Oral Q24H   amLODIPine 5 mg Oral Once   apixaban 2.5 mg Oral Q12H   ARIPiprazole 2 mg Oral Nightly   [START ON 9/11/2020] dexamethasone 1 mg Oral Daily With Breakfast   hydrALAZINE 50 mg Oral Q8H   insulin glargine 10 Units Subcutaneous Nightly   insulin lispro 0-9 Units Subcutaneous TID AC   insulin lispro 5 Units Subcutaneous TID With Meals   polyethylene glycol 17 g Oral Daily   senna-docusate sodium 1 tablet Oral Nightly   tamsulosin 0.4 mg Oral Daily      Diet Regular; Cardiac, Consistent Carbohydrate       Assessment/Plan     Active Hospital Problems    Diagnosis  POA   • **TIAGO (acute kidney injury) (CMS/[facility]) [N17.9]  Yes   • Current chronic use of systemic steroids [Z79.52]  Not Applicable   • Failure to thrive in adult [R62.7]  Yes   • Chronic anticoagulation [Z79.01]  Not Applicable   • Generalized weakness [R53.1]  Yes   • Hyperkalemia [E87.5]  Yes   • Type 2 diabetes mellitus with diabetic chronic kidney disease (CMS/[facility]) [E11.22]  Yes   • Tobacco use [Z72.0]  Yes   • CKD (chronic kidney disease) stage 3, GFR 30-59 ml/min (CMS/[facility]) [N18.3]  Yes   • Anemia of chronic disease [D63.8]  Yes   • Benign essential HTN [I10]  Yes   • Constipation [K59.00]  Yes   • Vitamin D deficiency [E55.9]  Yes   • Diabetic peripheral neuropathy (CMS/[facility]) [E11.42]  Yes   • Chronic fatigue [R53.82]  Yes   • Hyperlipidemia [E78.5]  Yes      Resolved Hospital Problems   No resolved problems to display.       80 y.o. male admitted with acute kidney injury and failure to thrive.  This has been going on for several weeks having just spent 3 weeks in a skilled nursing facility.  Remains hospitalized over renal issues.    Renal function overall stable.  Nephrology following.  Blood pressure uncontrolled and will increase his amlodipine back to home dose 10 mg daily.   Lisinopril and HCTZ discontinued on admit.  SNU placement was denied by his insurance.  Patient and wife both feel comfortable with his return home with home health.  Will plan on discharge tomorrow if blood pressure acceptable.  Will decrease Decadron as could be contributing to muscle weakness and certainly is contributing to hyperglycemia.  Recommended this be weaned off by his PCP after discharge.    · Eliquis (home med) for DVT prophylaxis.  · Full code.  · Discussed with patient, family, nursing staff and care team on multidisciplinary rounds.  · Anticipate discharge home with home health       Ramakrishna Orellana MD  [facility]  09/10/20  11:30    Patient was placed in face mask on first look.  I wore protective equipment throughout this patient encounter including a face mask, gloves and protective eyewear.  Hand hygiene was performed before donning protective equipment and after removal when leaving the room.

## 2020-09-10 NOTE — PLAN OF CARE
Pt hypertensive, otherwise Vss, RA, answers orientation questions correctly but often makes remarks inappropriate to the situation and seems forgetful. Incontinent. Plan is to discharge tomorrow home with home health apparently but physical therapy is uncertain patient will be able to ambulate safely at home. Ccp following. CTM

## 2020-09-11 ENCOUNTER — DOCUMENTATION (OUTPATIENT)
Dept: INTERNAL MEDICINE | Facility: HOSPITAL | Age: 80
End: 2020-09-11

## 2020-09-11 ENCOUNTER — READMISSION MANAGEMENT (OUTPATIENT)
Dept: CALL CENTER | Facility: HOSPITAL | Age: 80
End: 2020-09-11

## 2020-09-11 VITALS
DIASTOLIC BLOOD PRESSURE: 85 MMHG | RESPIRATION RATE: 18 BRPM | BODY MASS INDEX: 17.97 KG/M2 | TEMPERATURE: 98.1 F | SYSTOLIC BLOOD PRESSURE: 169 MMHG | HEIGHT: 67 IN | HEART RATE: 78 BPM | OXYGEN SATURATION: 99 % | WEIGHT: 114.5 LBS

## 2020-09-11 LAB
ALBUMIN SERPL-MCNC: 3.8 G/DL (ref 3.5–5.2)
ANION GAP SERPL CALCULATED.3IONS-SCNC: 9.9 MMOL/L (ref 5–15)
BUN SERPL-MCNC: 40 MG/DL (ref 8–23)
BUN/CREAT SERPL: 28.8 (ref 7–25)
CALCIUM SPEC-SCNC: 8.5 MG/DL (ref 8.6–10.5)
CHLORIDE SERPL-SCNC: 110 MMOL/L (ref 98–107)
CO2 SERPL-SCNC: 16.1 MMOL/L (ref 22–29)
CREAT SERPL-MCNC: 1.39 MG/DL (ref 0.76–1.27)
GFR SERPL CREATININE-BSD FRML MDRD: 49 ML/MIN/1.73
GLUCOSE BLDC GLUCOMTR-MCNC: 184 MG/DL (ref 70–130)
GLUCOSE BLDC GLUCOMTR-MCNC: 291 MG/DL (ref 70–130)
GLUCOSE SERPL-MCNC: 177 MG/DL (ref 65–99)
PHOSPHATE SERPL-MCNC: 3.6 MG/DL (ref 2.5–4.5)
POTASSIUM SERPL-SCNC: 3.6 MMOL/L (ref 3.5–5.2)
SODIUM SERPL-SCNC: 136 MMOL/L (ref 136–145)

## 2020-09-11 PROCEDURE — 63710000001 INSULIN LISPRO (HUMAN) PER 5 UNITS: Performed by: NURSE PRACTITIONER

## 2020-09-11 PROCEDURE — 82962 GLUCOSE BLOOD TEST: CPT

## 2020-09-11 PROCEDURE — 80069 RENAL FUNCTION PANEL: CPT | Performed by: INTERNAL MEDICINE

## 2020-09-11 PROCEDURE — 97110 THERAPEUTIC EXERCISES: CPT

## 2020-09-11 PROCEDURE — 63710000001 INSULIN LISPRO (HUMAN) PER 5 UNITS: Performed by: HOSPITALIST

## 2020-09-11 RX ORDER — AMLODIPINE BESYLATE 10 MG/1
10 TABLET ORAL
Qty: 30 TABLET | Refills: 0 | Status: SHIPPED | OUTPATIENT
Start: 2020-09-12 | End: 2020-09-16 | Stop reason: SDUPTHER

## 2020-09-11 RX ORDER — DEXAMETHASONE 1 MG
1 TABLET ORAL
Start: 2020-09-12 | End: 2020-09-15 | Stop reason: SDUPTHER

## 2020-09-11 RX ADMIN — POLYETHYLENE GLYCOL 3350 17 G: 17 POWDER, FOR SOLUTION ORAL at 08:39

## 2020-09-11 RX ADMIN — DEXAMETHASONE 1 MG: 0.5 TABLET ORAL at 08:39

## 2020-09-11 RX ADMIN — INSULIN LISPRO 5 UNITS: 100 INJECTION, SOLUTION INTRAVENOUS; SUBCUTANEOUS at 11:39

## 2020-09-11 RX ADMIN — TAMSULOSIN HYDROCHLORIDE 0.4 MG: 0.4 CAPSULE ORAL at 08:39

## 2020-09-11 RX ADMIN — INSULIN LISPRO 6 UNITS: 100 INJECTION, SOLUTION INTRAVENOUS; SUBCUTANEOUS at 11:39

## 2020-09-11 RX ADMIN — APIXABAN 2.5 MG: 2.5 TABLET, FILM COATED ORAL at 08:39

## 2020-09-11 RX ADMIN — HYDRALAZINE HYDROCHLORIDE 50 MG: 50 TABLET, FILM COATED ORAL at 06:52

## 2020-09-11 RX ADMIN — INSULIN LISPRO 5 UNITS: 100 INJECTION, SOLUTION INTRAVENOUS; SUBCUTANEOUS at 08:39

## 2020-09-11 RX ADMIN — AMLODIPINE BESYLATE 10 MG: 10 TABLET ORAL at 08:40

## 2020-09-11 RX ADMIN — ACETAMINOPHEN 650 MG: 325 TABLET, FILM COATED ORAL at 02:19

## 2020-09-11 NOTE — PLAN OF CARE
Problem: Patient Care Overview  Goal: Plan of Care Review  Outcome: Ongoing (interventions implemented as appropriate)  Flowsheets (Taken 9/11/2020 6807)  Progress: improving  Plan of Care Reviewed With: patient  Outcome Summary: Pt tolerated treatment well this date. Increased gait distance to 120ft w/ CGA only. Pt occasionally held onto wall rail for some support, though did not lose balance. Ambulates w/ decreased demian and forward flexed posture, though demonstrated overall improved safety awareness. If pt decides to d/c home, would recommend  for continued skilled PT for endurance and strength. Patient was intermittently wearing a face mask during this therapy encounter. Therapist used appropriate personal protective equipment including eye protection, mask, and gloves.  Mask used was standard procedure mask. Appropriate PPE was worn during the entire therapy session. Hand hygiene was completed before and after therapy session. Patient is not in enhanced droplet precautions.

## 2020-09-11 NOTE — PROGRESS NOTES
Case Management Discharge Note      Final Note: Pt was dc'd home with [facility]     Provided Post Acute Provider List?: N/A  N/A Provider List Comment: Spouse declined list at this time and requested referral to [facility]  Provided Post Acute Provider Quality & Resource List?: N/A  N/A Quality & Resource List Comment: Spouse declined CMS ratings at this time    Destination      No service has been selected for the patient.      Durable Medical Equipment      No service has been selected for the patient.      Dialysis/Infusion      No service has been selected for the patient.      Home Medical Care      Service Provider Request Status Selected Services Address Phone Number Fax Number    [facility] [facility] [address] [facility], [address], [place] [address] [phone]      Therapy      No service has been selected for the patient.      Community Resources      No service has been selected for the patient.        Transportation Services  Private: Car    Final Discharge Disposition Code: 06 - home with home health care

## 2020-09-11 NOTE — THERAPY TREATMENT NOTE
Patient Name: Courtney Medrano  : 1940    MRN: [id number]                              Today's Date: 2020       Admit Date: 2020    Visit Dx:     ICD-10-CM ICD-9-CM   1. Generalized weakness R53.1 780.79   2. Acute renal insufficiency N28.9 593.9   3. Hyperkalemia E87.5 276.7     Patient Active Problem List   Diagnosis   • Anxiety disorder   • Recurrent major depressive disorder, in full remission (CMS/[facility])   • Constipation   • Diabetes type 2, uncontrolled (CMS/[facility])   • GERD (gastroesophageal reflux disease)   • Hyperlipidemia   • Low back pain   • Prostatitis   • Pulmonary nodule   • Diabetic peripheral neuropathy (CMS/[facility])   • Chronic fatigue   • Vitamin D deficiency   • Noncompliance of patient with dietary regimen   • Uncontrolled type 2 diabetes mellitus (CMS/[facility])   • Constipation   • Leg pain   • Benign essential HTN   • Noncompliance with medications   • Perirectal abscess   • Anxiety   • CKD (chronic kidney disease) stage 3, GFR 30-59 ml/min (CMS/[facility])   • Anemia of chronic disease   • Low hemoglobin   • PAD (peripheral artery disease) (CMS/[facility])   • Polyp of colon   • Pyelonephritis   • Hematuria   • Tobacco use   • Type 2 diabetes mellitus with diabetic chronic kidney disease (CMS/[facility])   • Contusion of rib on right side   • Weight loss   • Generalized weakness   • Hyperkalemia   • TIAGO (acute kidney injury) (CMS/[facility])   • Current chronic use of systemic steroids   • Failure to thrive in adult   • Chronic anticoagulation     Past Medical History:   Diagnosis Date   • Type 2 diabetes mellitus (CMS/[facility])    • Vitamin D deficiency      Past Surgical History:   Procedure Laterality Date   • BACK SURGERY     • CYSTOSCOPY W/ URETERAL STENT PLACEMENT Left 2020    Procedure: CYSTOSCOPY URETERAL CATHETER/STENT INSERTION;  Surgeon: Clark Ramirez MD;  Location: [facility];  Service: Urology   • KIDNEY STONE SURGERY     • URINARY SURGERY      stent placement     General Information     Row  Name 09/11/20 0944          PT Evaluation Time/Intention    Document Type  therapy note (daily note)  -     Mode of Treatment  physical therapy  -     Row Name 09/11/20 0944          General Information    Existing Precautions/Restrictions  fall  -     Row Name 09/11/20 0944          Cognitive Assessment/Intervention- PT/OT    Orientation Status (Cognition)  oriented x 3  -       User Key  (r) = Recorded By, (t) = Taken By, (c) = Cosigned By    Initials Name Provider Type     Charlene Reid PTA Physical Therapy Assistant        Mobility     Row Name 09/11/20 0944          Bed Mobility Assessment/Treatment    Bed Mobility Assessment/Treatment  supine-sit  -SM     Supine-Sit [place] (Bed Mobility)  supervision  -     Sit-Supine [place] (Bed Mobility)  not tested  -     Assistive Device (Bed Mobility)  bed rails;head of bed elevated  -     Row Name 09/11/20 0944          Sit-Stand Transfer    Sit-Stand [place] (Transfers)  stand by assist  -     Row Name 09/11/20 0944          Gait/Stairs Assessment/Training    [place] Level (Gait)  contact guard  -     Distance in Feet (Gait)  120  -     Pattern (Gait)  step-through  -     Deviations/Abnormal Patterns (Gait)  demian decreased;stride length decreased  -SM     Bilateral Gait Deviations  forward flexed posture  -SM     Comment (Gait/Stairs)  occasionally used wall rails for support, though did not lose balance  -       User Key  (r) = Recorded By, (t) = Taken By, (c) = Cosigned By    Initials Name Provider Type     Charlene Reid PTA Physical Therapy Assistant        Obj/Interventions     Row Name 09/11/20 0945          Therapeutic Exercise    Lower Extremity (Therapeutic Exercise)  LAQ (long arc quad), bilateral;marching while seated  -     Lower Extremity Range of Motion (Therapeutic Exercise)  ankle dorsiflexion/plantar flexion, bilateral  -     Exercise Type (Therapeutic Exercise)  AROM (active range of  motion)  -SM     Position (Therapeutic Exercise)  seated  -SM     Sets/Reps (Therapeutic Exercise)  10 reps  -SM       User Key  (r) = Recorded By, (t) = Taken By, (c) = Cosigned By    Initials Name Provider Type    Charlene Coronel PTA Physical Therapy Assistant        Goals/Plan    No documentation.       Clinical Impression     Row Name 09/11/20 0946          Pain Assessment    Additional Documentation  Pain Scale: Numbers Pre/Post-Treatment (Group)  -SM     Row Name 09/11/20 0946          Pain Scale: Numbers Pre/Post-Treatment    Pain Scale: Numbers, Pretreatment  0/10 - no pain  -SM     Pain Scale: Numbers, Post-Treatment  0/10 - no pain  -SM     Row Name 09/11/20 0946          Positioning and Restraints    Pre-Treatment Position  in bed  -SM     Post Treatment Position  chair  -SM     In Chair  reclined;call light within reach;encouraged to call for assist;exit alarm on  -SM       User Key  (r) = Recorded By, (t) = Taken By, (c) = Cosigned By    Initials Name Provider Type    Charlene Coronel PTA Physical Therapy Assistant        Outcome Measures     Row Name 09/11/20 0946          How much help from another person do you currently need...    Turning from your back to your side while in flat bed without using bedrails?  4  -SM     Moving from lying on back to sitting on the side of a flat bed without bedrails?  3  -SM     Moving to and from a bed to a chair (including a wheelchair)?  3  -SM     Standing up from a chair using your arms (e.g., wheelchair, bedside chair)?  4  -SM     Climbing 3-5 steps with a railing?  3  -SM     To walk in hospital room?  3  -SM     AM-PAC 6 Clicks Score (PT)  20  -[facility] Name 09/11/20 0946          Functional Assessment    Outcome Measure Options  AM-PAC 6 Clicks Basic Mobility (PT)  -SM       User Key  (r) = Recorded By, (t) = Taken By, (c) = Cosigned By    Initials Name Provider Type    Charlene Coronel PTA Physical Therapy Assistant        Physical  Therapy Education                 Title: PT OT SLP Therapies (Done)     Topic: Physical Therapy (Done)     Point: Mobility training (Done)     Description:   Instruct learner(s) on safety and technique for assisting patient out of bed, chair or wheelchair.  Instruct in the proper use of assistive devices, such as walker, crutches, cane or brace.              Patient Friendly Description:   It's important to get you on your feet again, but we need to do so in a way that is safe for you. Falling has serious consequences, and your personal safety is the most important thing of all.        When it's time to get out of bed, one of us or a family member will sit next to you on the bed to give you support.     If your doctor or nurse tells you to use a walker, crutches, a cane, or a brace, be sure you use it every time you get out of bed, even if you think you don't need it.    Learning Progress Summary           Patient Acceptance, E,TB,D, VU,NR by  at 9/11/2020 0947    Acceptance, E,TB, VU,NR by JESSICA at 9/10/2020 1025    Acceptance, E,D, VU,NR by MS at 9/9/2020 1459    Acceptance, E,D, VU,NR by MS at 9/8/2020 1104    Acceptance, E,TB,D, VU,NR by  at 9/7/2020 1510    Acceptance, E, NR by  at 9/6/2020 1604    Acceptance, E, VU by AS at 9/6/2020 0451                   Point: Home exercise program (Done)     Description:   Instruct learner(s) on appropriate technique for monitoring, assisting and/or progressing patient with therapeutic exercises and activities.              Learning Progress Summary           Patient Acceptance, E,TB,D, VU,NR by  at 9/11/2020 0947    Acceptance, E,TB, VU,NR by DJ at 9/10/2020 1025    Acceptance, E,D, VU,NR by MS at 9/9/2020 1459    Acceptance, E,D, VU,NR by MS at 9/8/2020 1104    Acceptance, E,TB,D, VU,NR by SM at 9/7/2020 1510    Acceptance, E, VU by AS at 9/6/2020 0451                   Point: Body mechanics (Done)     Description:   Instruct learner(s) on proper positioning and  spine alignment for patient and/or caregiver during mobility tasks and/or exercises.              Learning Progress Summary           Patient Acceptance, E,TB,D, VU,NR by  at 9/11/2020 0947    Acceptance, E,TB, VU,NR by  at 9/10/2020 1025    Acceptance, E,D, VU,NR by MS at 9/9/2020 1459    Acceptance, E,D, VU,NR by MS at 9/8/2020 1104    Acceptance, E,TB,D, VU,NR by  at 9/7/2020 1510    Acceptance, E, VU by AS at 9/6/2020 0451                   Point: Precautions (Done)     Description:   Instruct learner(s) on prescribed precautions during mobility and gait tasks              Learning Progress Summary           Patient Acceptance, E,TB,D, VU,NR by  at 9/11/2020 0947    Acceptance, E,TB, VU,NR by  at 9/10/2020 1025    Acceptance, E,D, VU,NR by MS at 9/9/2020 1459    Acceptance, E,D, VU,NR by MS at 9/8/2020 1104    Acceptance, E,TB,D, VU,NR by  at 9/7/2020 1510    Acceptance, E, VU by AS at 9/6/2020 0451                               User Key     Initials Effective Dates Name Provider Type Discipline     04/03/18 -  Brenna Slater, PT Physical Therapist PT    MS 04/03/18 -  Balaji Azul, PT Physical Therapist PT     03/07/18 -  Charlene Reid, AMAYA Physical Therapy Assistant PT    DJ 10/25/19 -  Payal Terry, PT Physical Therapist PT    AS 08/10/20 -  Selwyn Lopez, RN Registered Nurse Nurse              PT Recommendation and Plan     Outcome Summary/Treatment Plan (PT)  Anticipated Discharge Disposition (PT): home with home health  Plan of Care Reviewed With: patient  Progress: improving  Outcome Summary: Pt tolerated treatment well this date. Increased gait distance to 120ft w/ CGA only. Pt occasionally held onto wall rail for some support, though did not lose balance. Ambulates w/ decreased demian and forward flexed posture, though demonstrated overall improved safety awareness. If pt decides to d/c home, would recommend  for continued skilled PT for endurance and strength.  Patient was intermittently wearing a face mask during this therapy encounter. Therapist used appropriate personal protective equipment including eye protection, mask, and gloves.  Mask used was standard procedure mask. Appropriate PPE was worn during the entire therapy session. Hand hygiene was completed before and after therapy session. Patient is not in enhanced droplet precautions.     Time Calculation:   PT Charges     Row Name 09/11/20 0951             Time Calculation    Start Time  0855  -      Stop Time  0918  -      Time Calculation (min)  23 min  -      PT Received On  09/11/20  -      PT - Next Appointment  09/12/20  -        User Key  (r) = Recorded By, (t) = Taken By, (c) = Cosigned By    Initials Name Provider Type     Charlene Reid PTA Physical Therapy Assistant        Therapy Charges for Today     Code Description Service Date Service Provider Modifiers Qty    73464602162 HC PT THER PROC EA 15 MIN 9/11/2020 Charlene Reid PTA GP 2          PT G-Codes  Outcome Measure Options: AM-PAC 6 Clicks Basic Mobility (PT)  AM-PAC 6 Clicks Score (PT): 20    Charlene Reid PTA  9/11/2020

## 2020-09-11 NOTE — PLAN OF CARE
Patient being discharged home with home health  Problem: Skin Injury Risk (Adult)  Goal: Identify Related Risk Factors and Signs and Symptoms  Outcome: Ongoing (interventions implemented as appropriate)  Goal: Skin Health and Integrity  Outcome: Ongoing (interventions implemented as appropriate)     Problem: Fall Risk (Adult)  Goal: Identify Related Risk Factors and Signs and Symptoms  Outcome: Ongoing (interventions implemented as appropriate)  Goal: Absence of Fall  Outcome: Ongoing (interventions implemented as appropriate)     Problem: Patient Care Overview  Goal: Plan of Care Review  Outcome: Ongoing (interventions implemented as appropriate)  Goal: Individualization and Mutuality  Outcome: Ongoing (interventions implemented as appropriate)  Goal: Discharge Needs Assessment  Outcome: Ongoing (interventions implemented as appropriate)  Goal: Interprofessional Rounds/Family Conf  Outcome: Ongoing (interventions implemented as appropriate)

## 2020-09-12 ENCOUNTER — TRANSITIONAL CARE MANAGEMENT TELEPHONE ENCOUNTER (OUTPATIENT)
Dept: CALL CENTER | Facility: HOSPITAL | Age: 80
End: 2020-09-12

## 2020-09-12 NOTE — OUTREACH NOTE
Call Center TCM Note      Responses   [facility] patient discharged from?  [place]   COVID-19 Test Status  Negative   Does the patient have one of the following disease processes/diagnoses(primary or secondary)?  Other   TCM attempt successful?  Yes   Call start time  1518   Call end time  1530   Discharge diagnosis  severe HTN, TIAGO, hyperkalemia, failure to thrive   Is patient permission given to speak with other caregiver?  Yes   List who call center can speak with  son, Osmin Medrano and spouse Vicky Medrano   Person spoke with today (if not patient) and relationship  both son and wife   Meds reviewed with patient/caregiver?  Yes   Is the patient having any side effects they believe may be caused by any medication additions or changes?  No   Does the patient have all medications ordered at discharge?  Yes   Is the patient taking all medications as directed (includes completed medication regime)?  Yes   Does the patient have a primary care provider?   Yes   Does the patient have an appointment with their PCP within 7 days of discharge?  Yes   Comments regarding PCP  Office Visit with Shantell Alvarez PA-C, Tuesday Sep 15, 2020 2:00 PM   Has the patient kept scheduled appointments due by today?  N/A   What is the Home health agency?   [facility] ([facility] denied)   Has home health visited the patient within 72 hours of discharge?  Yes   Pulse Ox monitoring  None   Psychosocial issues?  No   Did the patient receive a copy of their discharge instructions?  Yes   Nursing interventions  Reviewed instructions with patient [wife]   What is the patient's perception of their health status since discharge?  Improving   Is the patient/caregiver able to teach back signs and symptoms related to disease process for when to call PCP?  Yes   Is the patient/caregiver able to teach back signs and symptoms related to disease process for when to call 911?  Yes   Is the patient/caregiver able to teach back the hierarchy of who to  call/visit for symptoms/problems? PCP, Specialist, Home health nurse, Urgent Care, ED, 911  Yes   TCM call completed?  Yes          Milagros Coello RN    9/12/2020, 15:30 EDT

## 2020-09-12 NOTE — OUTREACH NOTE
Prep Survey      Responses   [facility] facility patient discharged from?  [place]   Is LACE score < 7 ?  No   Eligibility  [facility]   Date of Admission  09/05/20   Date of Discharge  09/11/20   Discharge Disposition  Home-Health Care Svc   Discharge diagnosis  severe HTN, TIAGO, hyperkalemia, failure to thrive   COVID-19 Test Status  Negative   Does the patient have one of the following disease processes/diagnoses(primary or secondary)?  Other   Does the patient have Home health ordered?  Yes   What is the Home health agency?   [facility]  (SNF denied)   Is there a DME ordered?  No   Prep survey completed?  Yes          Maria Luisa Ramirez RN

## 2020-09-14 DIAGNOSIS — E11.65 UNCONTROLLED TYPE 2 DIABETES MELLITUS WITH HYPERGLYCEMIA (HCC): Primary | ICD-10-CM

## 2020-09-14 NOTE — TELEPHONE ENCOUNTER
Milagros Winters requesting verbal orders to continue PT and get speech therapy eval -  pt choking on meds.  Milagros Winters states that pt is a lot weaker since seeing pt last.   Pt's glucose was 552 yesterday and wife has run out of test strips. Milagros Winters helped pt's wife order from mail order pharm but is need of short supply to [facility].

## 2020-09-15 ENCOUNTER — RESULTS ENCOUNTER (OUTPATIENT)
Dept: FAMILY MEDICINE CLINIC | Facility: CLINIC | Age: 80
End: 2020-09-15

## 2020-09-15 ENCOUNTER — OFFICE VISIT (OUTPATIENT)
Dept: FAMILY MEDICINE CLINIC | Facility: CLINIC | Age: 80
End: 2020-09-15

## 2020-09-15 VITALS
TEMPERATURE: 97.1 F | RESPIRATION RATE: 16 BRPM | BODY MASS INDEX: 18.05 KG/M2 | DIASTOLIC BLOOD PRESSURE: 66 MMHG | SYSTOLIC BLOOD PRESSURE: 139 MMHG | WEIGHT: 115 LBS | HEIGHT: 67 IN | HEART RATE: 78 BPM

## 2020-09-15 DIAGNOSIS — R10.9 FLANK PAIN: ICD-10-CM

## 2020-09-15 DIAGNOSIS — E11.22 TYPE 2 DIABETES MELLITUS WITH STAGE 3 CHRONIC KIDNEY DISEASE, WITH LONG-TERM CURRENT USE OF INSULIN (HCC): ICD-10-CM

## 2020-09-15 DIAGNOSIS — Z09 HOSPITAL DISCHARGE FOLLOW-UP: Primary | ICD-10-CM

## 2020-09-15 DIAGNOSIS — Z79.01 CHRONIC ANTICOAGULATION: Chronic | ICD-10-CM

## 2020-09-15 DIAGNOSIS — E78.49 OTHER HYPERLIPIDEMIA: ICD-10-CM

## 2020-09-15 DIAGNOSIS — R23.8 SKIN BREAKDOWN: ICD-10-CM

## 2020-09-15 DIAGNOSIS — E11.42 DIABETIC PERIPHERAL NEUROPATHY (HCC): ICD-10-CM

## 2020-09-15 DIAGNOSIS — Z79.4 TYPE 2 DIABETES MELLITUS WITH STAGE 3 CHRONIC KIDNEY DISEASE, WITH LONG-TERM CURRENT USE OF INSULIN (HCC): ICD-10-CM

## 2020-09-15 DIAGNOSIS — E55.9 VITAMIN D DEFICIENCY: ICD-10-CM

## 2020-09-15 DIAGNOSIS — R53.1 WEAKNESS: ICD-10-CM

## 2020-09-15 DIAGNOSIS — R53.82 CHRONIC FATIGUE: ICD-10-CM

## 2020-09-15 DIAGNOSIS — N18.30 TYPE 2 DIABETES MELLITUS WITH STAGE 3 CHRONIC KIDNEY DISEASE, WITH LONG-TERM CURRENT USE OF INSULIN (HCC): ICD-10-CM

## 2020-09-15 DIAGNOSIS — Z79.52 CURRENT CHRONIC USE OF SYSTEMIC STEROIDS: Chronic | ICD-10-CM

## 2020-09-15 PROCEDURE — 99496 TRANSJ CARE MGMT HIGH F2F 7D: CPT | Performed by: PHYSICIAN ASSISTANT

## 2020-09-15 RX ORDER — CALCIUM CITRATE/VITAMIN D3 200MG-6.25
TABLET ORAL
Qty: 300 EACH | Refills: 1 | Status: SHIPPED | OUTPATIENT
Start: 2020-09-15

## 2020-09-15 RX ORDER — DEXAMETHASONE 1 MG
2 TABLET ORAL
Start: 2020-09-15 | End: 2020-11-25

## 2020-09-15 RX ORDER — CALCIUM CITRATE/VITAMIN D3 200MG-6.25
TABLET ORAL
Qty: 300 EACH | Refills: 3 | Status: SHIPPED | OUTPATIENT
Start: 2020-09-15

## 2020-09-15 RX ORDER — ARIPIPRAZOLE 5 MG/1
5 TABLET ORAL DAILY
Qty: 30 TABLET | Refills: 1 | Status: SHIPPED | OUTPATIENT
Start: 2020-09-15 | End: 2020-11-25 | Stop reason: SDDI

## 2020-09-15 NOTE — PROGRESS NOTES
Transitional Care Follow Up Visit  Subjective     Courtney Medrano is a 80 y.o. male who presents for a transitional care management visit.    Within 48 business hours after discharge our office contacted him via telephone to coordinate his care and needs.      I reviewed and discussed the details of that call along with the discharge summary, hospital problems, inpatient lab results, inpatient diagnostic studies, and consultation reports with Courtney.     Current outpatient and discharge medications have been reconciled for the patient.  Reviewed by: Shantell Alvarez PA-C      Date of TCM Phone Call 9/11/2020   [facility]   Date of Admission 9/5/2020   Date of Discharge 9/11/2020   [place]     Risk for Readmission (LACE) Score: 13 (9/11/2020  6:00 AM)      History of Present Illness   Course During Hospital Stay:  9-5 to 9-     Already lost 14 lbs since came home.   Blood sugars vary; several glucose readings 400-500 range. Lowest reading was 191. Most readings 400-500s  10 units.  He started lower dose Decadron Friday---1mg;  Again, weight down 14 lbs since Friday; picks at his food.  I will d/w endocrine if can go back to 2mg and ? Adjust insulin.  Also depressed and recently added Abilify few mos ago and did help.  Need to increase dose.  He declines feeding tube.    He sees vascular doc and has him on Eliquis  He does have skin break down on bottom.  I will ask for wound care nurse to see him; already using barrier cream.  Needs help to walk in last few days. There is also concern about his Decadron causing muscle weakness.    Hospice is coming out tomorrow.  He does have living will.     I do want to go up to 2mg Decadron for now and see if will eat.  He will not take Ensure liquid. Try the pudding.---glycerna   He still smokes and declines PT exercises on his own.  He has CKD; need f/u Dr Osmin Fan MD.    Lab Results   Component Value Date    HGBA1C 9.00  (H) 09/06/2020     Lab Results   Component Value Date    WBC 6.44 09/08/2020    HGB 10.2 (L) 09/08/2020    HCT 33.3 (L) 09/08/2020    .4 (H) 09/08/2020     09/08/2020     Lab Results   Component Value Date    GLUCOSE 177 (H) 09/11/2020    CALCIUM 8.5 (L) 09/11/2020     09/11/2020    K 3.6 09/11/2020    CO2 16.1 (L) 09/11/2020     (H) 09/11/2020    BUN 40 (H) 09/11/2020    CREATININE 1.39 (H) 09/11/2020    EGFRIFAFRI 48 (L) 06/11/2020    EGFRIFNONA 49 (L) 09/11/2020    BCR 28.8 (H) 09/11/2020    ANIONGAP 9.9 09/11/2020       The following portions of the patient's history were reviewed and updated as appropriate: allergies, current medications, past family history, past medical history, past social history, past surgical history and problem list.    Review of Systems   Constitutional: Positive for activity change, appetite change, fatigue and unexpected weight change.   HENT: Negative for nosebleeds and trouble swallowing.    Eyes: Negative for pain and visual disturbance.   Respiratory: Positive for choking. Negative for chest tightness, shortness of breath and wheezing.    Cardiovascular: Negative for chest pain and palpitations.   Gastrointestinal: Negative for abdominal pain and blood in stool.   Endocrine: Negative.    Genitourinary: Positive for flank pain. Negative for difficulty urinating and hematuria.   Musculoskeletal: Negative for joint swelling.   Skin: Negative for color change and rash.   Allergic/Immunologic: Negative.    Neurological: Positive for weakness and light-headedness. Negative for syncope and speech difficulty.   Hematological: Negative for adenopathy.   Psychiatric/Behavioral: Positive for decreased concentration and dysphoric mood. Negative for agitation, confusion and suicidal ideas.   All other systems reviewed and are negative.      Objective   Physical Exam  Vitals signs and nursing note reviewed.   Constitutional:       General: He is not in acute  distress.     Appearance: He is well-developed. He is ill-appearing. He is not diaphoretic.   HENT:      Head: Normocephalic.      Nose: Nose normal.   Eyes:      General:         Right eye: No discharge.         Left eye: No discharge.      Conjunctiva/sclera: Conjunctivae normal.      Pupils: Pupils are equal, round, and reactive to light.   Neck:      Musculoskeletal: Normal range of motion and neck supple.   Cardiovascular:      Rate and Rhythm: Normal rate and regular rhythm.      Heart sounds: Normal heart sounds. No murmur.      Comments: Decreased pedal pulses  Pulmonary:      Effort: Pulmonary effort is normal.      Breath sounds: Normal breath sounds.   Abdominal:      Palpations: Abdomen is soft.      Tenderness: There is no abdominal tenderness. There is no guarding.   Musculoskeletal:      Comments: weakness   Skin:     General: Skin is warm and dry.      Findings: Bruising and erythema (buttocks red and skin breakdown present; nothing open now) present. No rash.   Neurological:      Mental Status: He is alert and oriented to person, place, and time. Mental status is at baseline.      Gait: Gait abnormal.   Psychiatric:         Mood and Affect: Affect is not inappropriate.      Comments: Flat          Assessment/Plan   Courtney was seen today for follow-up.    Diagnoses and all orders for this visit:    Other hyperlipidemia  -     glucose blood (True Metrix Blood Glucose Test) test strip; Use to test BG 3 times daily. DX Code: E11.42    Chronic fatigue  -     glucose blood (True Metrix Blood Glucose Test) test strip; Use to test BG 3 times daily. DX Code: E11.42    Diabetic peripheral neuropathy (CMS/HCC)  -     glucose blood (True Metrix Blood Glucose Test) test strip; Use to test BG 3 times daily. DX Code: E11.42    Vitamin D deficiency  -     glucose blood (True Metrix Blood Glucose Test) test strip; Use to test BG 3 times daily. DX Code: E11.42    Other orders  -     ARIPiprazole (Abilify) 5 MG  tablet; Take 1 tablet by mouth Daily. New dose for depression      Need to d/w RUSSELL Seth about insulin Rx and ? Go up on dose? Also want to go back on 2mg Decadron d/t 14 lb weight loss since Friday.   Raise dose Abilify d/t depression  Discussed end of life measures and has living will.   Stay off Metformin  Some flank pain and will order urine cx with home health  Home health to do wound care on bottom    Addendum; SHEEBA Seth, endocrine and recommends increasing Lantus to 25 units and keep appt in 10 days; ok to go back on Decadron 2mg daily.  I called Vicky 5:31 PM --voiced understanding of new directions

## 2020-09-16 RX ORDER — AMLODIPINE BESYLATE 10 MG/1
10 TABLET ORAL
Qty: 30 TABLET | Refills: 1 | Status: SHIPPED | OUTPATIENT
Start: 2020-09-16

## 2020-09-16 NOTE — TELEPHONE ENCOUNTER
Fang with [facility] called to verify pt's current meds.  Fang needs Rx for amlodipine 10 mg, Eliquis 2.5 mg as pt is no longer using [facility] (all other pharmacies removed from chart).    Fang is asking if pt is still taking pantoprazole, flomax, or Lexapro 20 mg. She is packaging pt's meds. Please advise.

## 2020-09-17 ENCOUNTER — APPOINTMENT (OUTPATIENT)
Dept: CT IMAGING | Facility: HOSPITAL | Age: 80
End: 2020-09-17

## 2020-09-17 ENCOUNTER — TELEPHONE (OUTPATIENT)
Dept: FAMILY MEDICINE CLINIC | Facility: CLINIC | Age: 80
End: 2020-09-17

## 2020-09-22 ENCOUNTER — READMISSION MANAGEMENT (OUTPATIENT)
Dept: CALL CENTER | Facility: HOSPITAL | Age: 80
End: 2020-09-22

## 2020-09-22 NOTE — OUTREACH NOTE
Medical Week 2 Survey      Responses   [facility] patient discharged from?  [place]   Does the patient have one of the following disease processes/diagnoses(primary or secondary)?  Other   Week 2 attempt successful?  No   Unsuccessful attempts  Attempt 1          Ermelinda Cartagena RN

## 2020-09-25 ENCOUNTER — READMISSION MANAGEMENT (OUTPATIENT)
Dept: CALL CENTER | Facility: HOSPITAL | Age: 80
End: 2020-09-25

## 2020-09-25 ENCOUNTER — OFFICE VISIT (OUTPATIENT)
Dept: ENDOCRINOLOGY | Age: 80
End: 2020-09-25

## 2020-09-25 VITALS
DIASTOLIC BLOOD PRESSURE: 62 MMHG | HEIGHT: 67 IN | SYSTOLIC BLOOD PRESSURE: 116 MMHG | WEIGHT: 124.6 LBS | BODY MASS INDEX: 19.56 KG/M2

## 2020-09-25 DIAGNOSIS — I10 BENIGN ESSENTIAL HTN: ICD-10-CM

## 2020-09-25 DIAGNOSIS — E11.65 UNCONTROLLED TYPE 2 DIABETES MELLITUS WITH HYPERGLYCEMIA (HCC): Primary | ICD-10-CM

## 2020-09-25 PROCEDURE — 99214 OFFICE O/P EST MOD 30 MIN: CPT | Performed by: NURSE PRACTITIONER

## 2020-09-25 RX ORDER — PEN NEEDLE, DIABETIC 32GX 5/32"
NEEDLE, DISPOSABLE MISCELLANEOUS
Qty: 100 EACH | Refills: 1 | Status: SHIPPED | OUTPATIENT
Start: 2020-09-25

## 2020-09-25 NOTE — PROGRESS NOTES
"Subjective   Mitchpari Medrano is a 80 y.o. male is here today for follow-up.  Chief Complaint   Patient presents with   • Diabetes     F/U uncontrolled type 2 diabetes, checks BS 3x a day, readings today, recent eye exam   • Vitamin D Deficiency   • Med Refill     needles     /62   Ht 170.2 cm (67\")   Wt 56.5 kg (124 lb 9.6 oz)   BMI 19.52 kg/m²   Current Outpatient Medications on File Prior to Visit   Medication Sig   • Alcohol Swabs pads Swab finger before obtaining glucose level for diabetes. E11.42   • amLODIPine (NORVASC) 10 MG tablet Take 1 tablet by mouth Daily.   • apixaban (ELIQUIS) 2.5 MG tablet tablet Take 1 tablet by mouth Every 12 (Twelve) Hours.   • ARIPiprazole (Abilify) 5 MG tablet Take 1 tablet by mouth Daily. New dose for depression   • Cholecalciferol (VITAMIN D3) 3000 units tablet Take 1,000 Units by mouth Daily.   • dexamethasone (DECADRON) 1 MG tablet Take 2 tablets by mouth Daily With Breakfast. Change back to 2mg daily 9-15-20   • DROPLET PEN NEEDLES 31G X 8 MM misc    • glucose blood (True Metrix Blood Glucose Test) test strip Use to test BG 3 times daily. DX Code: E11.42   • glucose blood test strip Use as instructed to test blood sugar once daily.  Dx Code: E11.9   • hydrALAZINE (APRESOLINE) 50 MG tablet Take 1 tablet by mouth 3 (Three) Times a Day.   • Insulin Glargine (LANTUS SOLOSTAR) 100 UNIT/ML injection pen Inject 25 Units under the skin into the appropriate area as directed Every Night. (Patient taking differently: Inject 20 Units under the skin into the appropriate area as directed Every Night.)   • Multiple Vitamins-Minerals (CENTRUM ADULTS PO) Take  by mouth.   • pantoprazole (PROTONIX) 40 MG EC tablet Take 1 tablet by mouth Daily.   • sodium bicarbonate 650 MG tablet Take 1 tablet by mouth 3 (Three) Times a Day.   • tamsulosin (FLOMAX) 0.4 MG capsule 24 hr capsule Take 1 capsule by mouth Daily. For urine flow (watch dizziness)   • True Metrix Blood Glucose Test test " strip TEST BLOOD SUGAR THREE TIMES DAILY     No current facility-administered medications on file prior to visit.      History reviewed. No pertinent family history.  Social History     Tobacco Use   • Smoking status: Current Every Day Smoker     Types: Cigars   • Smokeless tobacco: Never Used   • Tobacco comment: 1 cigar/day   Substance Use Topics   • Alcohol use: No   • Drug use: No     Allergies   Allergen Reactions   • Flexeril [Cyclobenzaprine] Hives         History of Present Illness   Encounter Diagnoses   Name Primary?   • Uncontrolled type 2 diabetes mellitus with hyperglycemia (CMS/[facility]) Yes   • Benign essential HTN    • Vitamin D deficiency    • Other hyperlipidemia    80-year-old male patient evaluated today for the above diagnoses.  He is accompanied by his son and his wife.  He was recently in the hospital for high potassium and kidney failure.  He also had stents placed in his lower extremities.  He has been told that he is no longer a candidate for surgery.  He likes to eat sweets will eat anything that sweet including chocolate.  His most recent A1c on September 6 was 9.0.  He has been admitted to hospice for end-of-life care.  He is currently taking Lantus insulin but only as needed.  He will check his blood sugars and if his blood sugars are too high they have been titrating the amount of Lantus to give him anywhere from 5 to 15 units.  He was advised to start on Lantus several weeks ago by his primary care provider when he left the hospital and he was advised to take 15 units daily consistently.  They apparently did not understand that.  His blood sugars have been as high as greater than 600 and as low as 72.  On the days he does take Lantus his blood sugars the next day are in satisfactory range.  They will then withhold the Lantus and the following day his blood sugars will be 3-500.  They were educated at today's visit on how to give basal insulin and to give it consistently.      The following  portions of the patient's history were reviewed and updated as appropriate: allergies, current medications, past family history, past medical history, past social history, past surgical history and problem list.    Review of Systems   Constitutional: Negative for appetite change, fatigue and unexpected weight change.   Cardiovascular: Negative for chest pain, palpitations and leg swelling.   Gastrointestinal: Negative for abdominal pain, constipation and diarrhea.   Endocrine: Negative for cold intolerance, heat intolerance, polydipsia, polyphagia and polyuria.   Neurological: Negative for dizziness, light-headedness, numbness and headaches.   Psychiatric/Behavioral: Negative for sleep disturbance.       Objective   Physical Exam  Vitals signs and nursing note reviewed.   Constitutional:       General: He is not in acute distress.     Appearance: He is well-developed. He is not diaphoretic.   HENT:      Head: Normocephalic and atraumatic.      Right Ear: External ear normal.      Left Ear: External ear normal.      Nose: Nose normal.   Eyes:      General:         Right eye: No discharge.         Left eye: No discharge.      Pupils: Pupils are equal, round, and reactive to light.   Neck:      Musculoskeletal: Normal range of motion and neck supple. No edema or erythema.      Thyroid: No thyromegaly.      Vascular: No carotid bruit.      Trachea: No tracheal deviation.   Cardiovascular:      Rate and Rhythm: Normal rate and regular rhythm.      Heart sounds: Normal heart sounds. No murmur. No friction rub. No gallop.    Pulmonary:      Effort: Pulmonary effort is normal. No respiratory distress.      Breath sounds: Normal breath sounds. No wheezing or rales.   Abdominal:      General: Bowel sounds are normal. There is no distension.      Palpations: Abdomen is soft.      Tenderness: There is no abdominal tenderness.   Musculoskeletal: Normal range of motion.         General: No deformity.   Lymphadenopathy:       Cervical: No cervical adenopathy.   Skin:     General: Skin is warm and dry.      Coloration: Skin is not pale.      Findings: No erythema or rash.   Neurological:      Mental Status: He is alert and oriented to person, place, and time.      Coordination: Coordination normal.   Psychiatric:         Behavior: Behavior normal.         Thought Content: Thought content normal.         Judgment: Judgment normal.       Results for orders placed or performed during the hospital encounter of 09/05/20   COVID-19,BIOTAP, NP/OP SWAB IN TRANSPORT MEDIA OR SALINE 24-36 HR TAT - Swab, Nasopharynx    Specimen: Nasopharynx; Swab   Result Value Ref Range    SARS-CoV-2 PCR Not Detected    Eosinophil Smear - Urine, Urine, Clean Catch    Specimen: Urine, Clean Catch   Result Value Ref Range    Eosinophil Smear 0 0 - 0 % EOS/100 Cells   COVID-19,BIOTAP, NP/OP SWAB IN TRANSPORT MEDIA OR SALINE 24-36 HR TAT - Swab, Nasopharynx    Specimen: Nasopharynx; Swab   Result Value Ref Range    SARS-CoV-2 PCR Not Detected Not Detected   Comprehensive Metabolic Panel    Specimen: Blood   Result Value Ref Range    Glucose 235 (H) 65 - 99 mg/dL    BUN 45 (H) 8 - 23 mg/dL    Creatinine 2.70 (H) 0.76 - 1.27 mg/dL    Sodium 131 (L) 136 - 145 mmol/L    Potassium 6.4 (C) 3.5 - 5.2 mmol/L    Chloride 99 98 - 107 mmol/L    CO2 16.7 (L) 22.0 - 29.0 mmol/L    Calcium 9.5 8.6 - 10.5 mg/dL    Total Protein 6.3 6.0 - 8.5 g/dL    Albumin 4.30 3.50 - 5.20 g/dL    ALT (SGPT) 7 1 - 41 U/L    AST (SGOT) 12 1 - 40 U/L    Alkaline Phosphatase 63 39 - 117 U/L    Total Bilirubin 0.2 0.0 - 1.2 mg/dL    eGFR Non African Amer 23 (L) >60 mL/min/1.73    Globulin 2.0 gm/dL    A/G Ratio 2.2 g/dL    BUN/Creatinine Ratio 16.7 7.0 - 25.0    Anion Gap 15.3 (H) 5.0 - 15.0 mmol/L   Protime-INR    Specimen: Blood   Result Value Ref Range    Protime 13.9 11.7 - 14.2 Seconds    INR 1.09 0.90 - 1.10   aPTT    Specimen: Blood   Result Value Ref Range    PTT 28.1 22.7 - 35.4 seconds    Urinalysis With Microscopic If Indicated (No Culture) - Urine, Clean Catch    Specimen: Urine, Clean Catch   Result Value Ref Range    Color, UA Yellow Yellow, Straw    Appearance, UA Clear Clear    pH, UA >=9.0 (H) 5.0 - 8.0    Specific Gravity, UA 1.017 1.005 - 1.030    Glucose,  mg/dL (Trace) (A) Negative    Ketones, UA Negative Negative    Bilirubin, UA Negative Negative    Blood, UA Negative Negative    Protein, UA 30 mg/dL (1+) (A) Negative    Leuk Esterase, UA Negative Negative    Nitrite, UA Negative Negative    Urobilinogen, UA 0.2 E.U./dL 0.2 - 1.0 E.U./dL   Troponin    Specimen: Blood   Result Value Ref Range    Troponin T 0.014 0.000 - 0.030 ng/mL   CBC Auto Differential    Specimen: Blood   Result Value Ref Range    WBC 8.25 3.40 - 10.80 10*3/mm3    RBC 3.39 (L) 4.14 - 5.80 10*6/mm3    Hemoglobin 10.6 (L) 13.0 - 17.7 g/dL    Hematocrit 33.0 (L) 37.5 - 51.0 %    MCV 97.3 (H) 79.0 - 97.0 fL    MCH 31.3 26.6 - 33.0 pg    MCHC 32.1 31.5 - 35.7 g/dL    RDW 12.9 12.3 - 15.4 %    RDW-SD 45.5 37.0 - 54.0 fl    MPV 9.6 6.0 - 12.0 fL    Platelets 264 140 - 450 10*3/mm3    Neutrophil % 62.4 42.7 - 76.0 %    Lymphocyte % 30.5 19.6 - 45.3 %    Monocyte % 5.9 5.0 - 12.0 %    Eosinophil % 0.2 (L) 0.3 - 6.2 %    Basophil % 0.2 0.0 - 1.5 %    Immature Grans % 0.8 (H) 0.0 - 0.5 %    Neutrophils, Absolute 5.13 1.70 - 7.00 10*3/mm3    Lymphocytes, Absolute 2.52 0.70 - 3.10 10*3/mm3    Monocytes, Absolute 0.49 0.10 - 0.90 10*3/mm3    Eosinophils, Absolute 0.02 0.00 - 0.40 10*3/mm3    Basophils, Absolute 0.02 0.00 - 0.20 10*3/mm3    Immature Grans, Absolute 0.07 (H) 0.00 - 0.05 10*3/mm3    nRBC 0.0 0.0 - 0.2 /100 WBC   Urinalysis, Microscopic Only - Urine, Clean Catch    Specimen: Urine, Clean Catch   Result Value Ref Range    RBC, UA 0-2 None Seen, 0-2 /HPF    WBC, UA 0-2 None Seen, 0-2 /HPF    Bacteria, UA None Seen None Seen /HPF    Squamous Epithelial Cells, UA 0-2 None Seen, 0-2 /HPF    Hyaline Casts, UA 0-2  None Seen /LPF    Methodology Automated Microscopy    Hemoglobin A1c    Specimen: Blood   Result Value Ref Range    Hemoglobin A1C 9.00 (H) 4.80 - 5.60 %   Basic Metabolic Panel    Specimen: Blood   Result Value Ref Range    Glucose 285 (H) 65 - 99 mg/dL    BUN 36 (H) 8 - 23 mg/dL    Creatinine 2.13 (H) 0.76 - 1.27 mg/dL    Sodium 131 (L) 136 - 145 mmol/L    Potassium 5.6 (H) 3.5 - 5.2 mmol/L    Chloride 106 98 - 107 mmol/L    CO2 17.2 (L) 22.0 - 29.0 mmol/L    Calcium 8.1 (L) 8.6 - 10.5 mg/dL    eGFR Non African Amer 30 (L) >60 mL/min/1.73    BUN/Creatinine Ratio 16.9 7.0 - 25.0    Anion Gap 7.8 5.0 - 15.0 mmol/L   CBC (No Diff)    Specimen: Blood   Result Value Ref Range    WBC 7.49 3.40 - 10.80 10*3/mm3    RBC 3.08 (L) 4.14 - 5.80 10*6/mm3    Hemoglobin 9.6 (L) 13.0 - 17.7 g/dL    Hematocrit 29.6 (L) 37.5 - 51.0 %    MCV 96.1 79.0 - 97.0 fL    MCH 31.2 26.6 - 33.0 pg    MCHC 32.4 31.5 - 35.7 g/dL    RDW 13.1 12.3 - 15.4 %    RDW-SD 45.6 37.0 - 54.0 fl    MPV 9.3 6.0 - 12.0 fL    Platelets 220 140 - 450 10*3/mm3   Basic Metabolic Panel    Specimen: Arm, Left; Blood   Result Value Ref Range    Glucose 163 (H) 65 - 99 mg/dL    BUN 35 (H) 8 - 23 mg/dL    Creatinine 2.23 (H) 0.76 - 1.27 mg/dL    Sodium 130 (L) 136 - 145 mmol/L    Potassium 5.6 (H) 3.5 - 5.2 mmol/L    Chloride 105 98 - 107 mmol/L    CO2 16.7 (L) 22.0 - 29.0 mmol/L    Calcium 8.2 (L) 8.6 - 10.5 mg/dL    eGFR Non African Amer 29 (L) >60 mL/min/1.73    BUN/Creatinine Ratio 15.7 7.0 - 25.0    Anion Gap 8.3 5.0 - 15.0 mmol/L   Basic Metabolic Panel    Specimen: Blood   Result Value Ref Range    Glucose 190 (H) 65 - 99 mg/dL    BUN 29 (H) 8 - 23 mg/dL    Creatinine 1.83 (H) 0.76 - 1.27 mg/dL    Sodium 133 (L) 136 - 145 mmol/L    Potassium 4.2 3.5 - 5.2 mmol/L    Chloride 106 98 - 107 mmol/L    CO2 18.9 (L) 22.0 - 29.0 mmol/L    Calcium 7.8 (L) 8.6 - 10.5 mg/dL    eGFR Non African Amer 36 (L) >60 mL/min/1.73    BUN/Creatinine Ratio 15.8 7.0 - 25.0    Anion  Gap 8.1 5.0 - 15.0 mmol/L   Magnesium    Specimen: Blood   Result Value Ref Range    Magnesium 2.0 1.6 - 2.4 mg/dL   CBC (No Diff)    Specimen: Blood   Result Value Ref Range    WBC 6.91 3.40 - 10.80 10*3/mm3    RBC 3.10 (L) 4.14 - 5.80 10*6/mm3    Hemoglobin 9.8 (L) 13.0 - 17.7 g/dL    Hematocrit 29.4 (L) 37.5 - 51.0 %    MCV 94.8 79.0 - 97.0 fL    MCH 31.6 26.6 - 33.0 pg    MCHC 33.3 31.5 - 35.7 g/dL    RDW 13.1 12.3 - 15.4 %    RDW-SD 45.0 37.0 - 54.0 fl    MPV 9.5 6.0 - 12.0 fL    Platelets 210 140 - 450 10*3/mm3   Basic Metabolic Panel    Specimen: Blood   Result Value Ref Range    Glucose 189 (H) 65 - 99 mg/dL    BUN 32 (H) 8 - 23 mg/dL    Creatinine 2.01 (H) 0.76 - 1.27 mg/dL    Sodium 136 136 - 145 mmol/L    Potassium 3.7 3.5 - 5.2 mmol/L    Chloride 107 98 - 107 mmol/L    CO2 19.3 (L) 22.0 - 29.0 mmol/L    Calcium 8.0 (L) 8.6 - 10.5 mg/dL    eGFR Non African Amer 32 (L) >60 mL/min/1.73    BUN/Creatinine Ratio 15.9 7.0 - 25.0    Anion Gap 9.7 5.0 - 15.0 mmol/L   CBC (No Diff)    Specimen: Blood   Result Value Ref Range    WBC 6.44 3.40 - 10.80 10*3/mm3    RBC 3.22 (L) 4.14 - 5.80 10*6/mm3    Hemoglobin 10.2 (L) 13.0 - 17.7 g/dL    Hematocrit 33.3 (L) 37.5 - 51.0 %    .4 (H) 79.0 - 97.0 fL    MCH 31.7 26.6 - 33.0 pg    MCHC 30.6 (L) 31.5 - 35.7 g/dL    RDW 12.9 12.3 - 15.4 %    RDW-SD 48.5 37.0 - 54.0 fl    MPV 9.9 6.0 - 12.0 fL    Platelets 199 140 - 450 10*3/mm3   Sodium, Urine, Random - Urine, Clean Catch    Specimen: Urine, Clean Catch   Result Value Ref Range    Sodium, Urine 101 mmol/L   Creatinine, Urine, Random - Urine, Clean Catch    Specimen: Urine, Clean Catch   Result Value Ref Range    Creatinine, Urine 24.1 mg/dL   Chloride, Urine, Random - Urine, Clean Catch    Specimen: Urine, Clean Catch   Result Value Ref Range    Chloride, Urine 62 mmol/L   Renal Function Panel    Specimen: Blood   Result Value Ref Range    Glucose 114 (H) 65 - 99 mg/dL    BUN 27 (H) 8 - 23 mg/dL    Creatinine 1.67  (H) 0.76 - 1.27 mg/dL    Sodium 139 136 - 145 mmol/L    Potassium 3.6 3.5 - 5.2 mmol/L    Chloride 109 (H) 98 - 107 mmol/L    CO2 19.1 (L) 22.0 - 29.0 mmol/L    Calcium 8.7 8.6 - 10.5 mg/dL    Albumin 3.90 3.50 - 5.20 g/dL    Phosphorus 3.3 2.5 - 4.5 mg/dL    Anion Gap 10.9 5.0 - 15.0 mmol/L    BUN/Creatinine Ratio 16.2 7.0 - 25.0    eGFR Non African Amer 40 (L) >60 mL/min/1.73   Renal Function Panel    Specimen: Blood   Result Value Ref Range    Glucose 302 (H) 65 - 99 mg/dL    BUN 40 (H) 8 - 23 mg/dL    Creatinine 1.74 (H) 0.76 - 1.27 mg/dL    Sodium 137 136 - 145 mmol/L    Potassium 4.0 3.5 - 5.2 mmol/L    Chloride 109 (H) 98 - 107 mmol/L    CO2 17.8 (L) 22.0 - 29.0 mmol/L    Calcium 8.4 (L) 8.6 - 10.5 mg/dL    Albumin 3.80 3.50 - 5.20 g/dL    Phosphorus 4.2 2.5 - 4.5 mg/dL    Anion Gap 10.2 5.0 - 15.0 mmol/L    BUN/Creatinine Ratio 23.0 7.0 - 25.0    eGFR Non African Amer 38 (L) >60 mL/min/1.73   Renal Function Panel    Specimen: Hand, Right; Blood   Result Value Ref Range    Glucose 177 (H) 65 - 99 mg/dL    BUN 40 (H) 8 - 23 mg/dL    Creatinine 1.39 (H) 0.76 - 1.27 mg/dL    Sodium 136 136 - 145 mmol/L    Potassium 3.6 3.5 - 5.2 mmol/L    Chloride 110 (H) 98 - 107 mmol/L    CO2 16.1 (L) 22.0 - 29.0 mmol/L    Calcium 8.5 (L) 8.6 - 10.5 mg/dL    Albumin 3.80 3.50 - 5.20 g/dL    Phosphorus 3.6 2.5 - 4.5 mg/dL    Anion Gap 9.9 5.0 - 15.0 mmol/L    BUN/Creatinine Ratio 28.8 (H) 7.0 - 25.0    eGFR Non African Amer 49 (L) >60 mL/min/1.73   POC Glucose Once    Specimen: Blood   Result Value Ref Range    Glucose 302 (H) 70 - 130 mg/dL   POC Glucose Once    Specimen: Blood   Result Value Ref Range    Glucose 87 70 - 130 mg/dL   POC Glucose Once    Specimen: Blood   Result Value Ref Range    Glucose 174 (H) 70 - 130 mg/dL   POC Glucose Once    Specimen: Blood   Result Value Ref Range    Glucose 242 (H) 70 - 130 mg/dL   POC Glucose Once    Specimen: Blood   Result Value Ref Range    Glucose 182 (H) 70 - 130 mg/dL   POC  Glucose Once    Specimen: Blood   Result Value Ref Range    Glucose 262 (H) 70 - 130 mg/dL   POC Glucose Once    Specimen: Blood   Result Value Ref Range    Glucose 242 (H) 70 - 130 mg/dL   POC Glucose Once    Specimen: Blood   Result Value Ref Range    Glucose 209 (H) 70 - 130 mg/dL   POC Glucose Once    Specimen: Blood   Result Value Ref Range    Glucose 179 (H) 70 - 130 mg/dL   POC Glucose Once    Specimen: Blood   Result Value Ref Range    Glucose 282 (H) 70 - 130 mg/dL   POC Glucose Once    Specimen: Blood   Result Value Ref Range    Glucose 350 (H) 70 - 130 mg/dL   POC Glucose Once    Specimen: Blood   Result Value Ref Range    Glucose 311 (H) 70 - 130 mg/dL   POC Glucose Once    Specimen: Blood   Result Value Ref Range    Glucose 84 70 - 130 mg/dL   POC Glucose Once    Specimen: Blood   Result Value Ref Range    Glucose 343 (H) 70 - 130 mg/dL   POC Glucose Once    Specimen: Blood   Result Value Ref Range    Glucose 343 (H) 70 - 130 mg/dL   POC Glucose Once    Specimen: Blood   Result Value Ref Range    Glucose 312 (H) 70 - 130 mg/dL   POC Glucose Once    Specimen: Blood   Result Value Ref Range    Glucose 285 (H) 70 - 130 mg/dL   POC Glucose Once    Specimen: Blood   Result Value Ref Range    Glucose 113 70 - 130 mg/dL   POC Glucose Once    Specimen: Blood   Result Value Ref Range    Glucose 227 (H) 70 - 130 mg/dL   POC Glucose Once    Specimen: Blood   Result Value Ref Range    Glucose 310 (H) 70 - 130 mg/dL   POC Glucose Once    Specimen: Blood   Result Value Ref Range    Glucose 184 (H) 70 - 130 mg/dL   POC Glucose Once    Specimen: Blood   Result Value Ref Range    Glucose 291 (H) 70 - 130 mg/dL         Assessment/Plan   Problems Addressed this Visit        Cardiovascular and Mediastinum    Hyperlipidemia    Benign essential HTN       Digestive    Vitamin D deficiency       Endocrine    Diabetes type 2, uncontrolled (CMS/[facility]) - Primary      Diagnoses       Codes Comments    Uncontrolled type 2 diabetes  mellitus with hyperglycemia (CMS/[facility])    -  Primary ICD-10-CM: E11.65  ICD-9-CM: 250.02     Benign essential HTN     ICD-10-CM: I10  ICD-9-CM: 401.1     Vitamin D deficiency     ICD-10-CM: E55.9  ICD-9-CM: 268.9     Other hyperlipidemia     ICD-10-CM: E78.49  ICD-9-CM: 272.4         Patient was seen and examined.  He has had fluctuations of blood sugars as result of not taking his insulin consistently.  He was increased to 15 units of Lantus insulin and does have improved blood sugars after he will take the insulin however on days he does not take it he has blood sugars as high as over 600.  We discussed being consistent with taking 12 units of Lantus once daily.  If his blood sugars start running greater than 200 they can contact the office and we can possibly address a sliding scale insulin.  His medication list was reviewed and updated for accuracy.  He was taken off of metformin in the hospital due to renal insufficiency.  He is in hospice care currently.  His labs were reviewed from his hospital records.  No other medications were added or changed.  He will follow-up in 6 months with labs prior.  Patient and his family were educated today regarding pathophysiology of diabetes as well as how to treat with basal insulin.  Blood pressures in satisfactory range.         Continue all current medications  Decrease Lantus to 12 units once daily but you need to take your Lantus every day consistently.  If your blood sugars start trending too high greater than 200 please let the office know and we can discuss adding a sliding scale insulin if needed.

## 2020-09-25 NOTE — OUTREACH NOTE
Medical Week 2 Survey      Responses   [facility] patient discharged from?  [place]   COVID-19 Test Status  Negative   Does the patient have one of the following disease processes/diagnoses(primary or secondary)?  Other   Week 2 attempt successful?  Yes   Call start time  1718   Discharge diagnosis  severe HTN, TIAGO, hyperkalemia, failure to thrive   Revoke  Change in health status-moved to LTC/SNF/Hospice [Wife reports that patient is now under Hospice care. ]   Call end time  1719   Is patient permission given to speak with other caregiver?  Yes   Person spoke with today (if not patient) and relationship  spouse, Rashid Coello RN

## 2020-09-25 NOTE — PATIENT INSTRUCTIONS
Continue all current medications  Decrease Lantus to 12 units once daily but you need to take your Lantus every day consistently.  If your blood sugars start trending too high greater than 200 please let the office know and we can discuss adding a sliding scale insulin if needed.

## 2020-09-29 NOTE — PLAN OF CARE
Problem: Patient Care Overview  Goal: Plan of Care Review  Outcome: Ongoing (interventions implemented as appropriate)  Flowsheets  Taken 9/10/2020 1025  Progress: no change  Plan of Care Reviewed With: patient  Taken 9/10/2020 1021  Outcome Summary: Pt req CGA for bed mobility and transfers today. He demonstrates a consistent posterior and R lat lean while sitting which worsens with seated HEP, but he is able to correct with vc. Pt amb 70' with r wx and CONSTANT vc to take bigger steps and to stay focused on task. He req occasional A to maneuver r wx, kacie when turning. Cont PT daily for ther ex, gt/transfer training, bed mobility, balance, HEP progression, and endurance as tolerated to prepare for d/c to facility.  Patient was intermittently wearing a face mask during this therapy encounter. Therapist used appropriate personal protective equipment including eye protection, mask, and gloves.  Mask used was standard procedure mask. Appropriate PPE was worn during the entire therapy session. Hand hygiene was completed before and after therapy session. Patient is not in enhanced droplet precautions.         LOV 5-  NOV 10-    Refills sent

## 2020-10-01 DIAGNOSIS — E78.49 OTHER HYPERLIPIDEMIA: ICD-10-CM

## 2020-10-01 DIAGNOSIS — E55.9 VITAMIN D DEFICIENCY: ICD-10-CM

## 2020-10-01 DIAGNOSIS — I10 BENIGN ESSENTIAL HTN: ICD-10-CM

## 2020-10-01 DIAGNOSIS — Z91.119 NONCOMPLIANCE OF PATIENT WITH DIETARY REGIMEN: ICD-10-CM

## 2020-10-01 DIAGNOSIS — E11.42 DIABETIC PERIPHERAL NEUROPATHY (HCC): ICD-10-CM

## 2020-10-01 DIAGNOSIS — Z91.14 NONCOMPLIANCE WITH MEDICATIONS: ICD-10-CM

## 2020-10-01 DIAGNOSIS — N41.9 PROSTATITIS, UNSPECIFIED PROSTATITIS TYPE: ICD-10-CM

## 2020-10-02 RX ORDER — TAMSULOSIN HYDROCHLORIDE 0.4 MG/1
1 CAPSULE ORAL DAILY
Qty: 90 CAPSULE | OUTPATIENT
Start: 2020-10-02

## 2020-10-02 RX ORDER — ESCITALOPRAM OXALATE 20 MG/1
20 TABLET ORAL DAILY
Qty: 90 TABLET | Refills: 1 | OUTPATIENT
Start: 2020-10-02

## 2020-10-02 RX ORDER — AMLODIPINE BESYLATE 5 MG/1
TABLET ORAL
Qty: 90 TABLET | Refills: 1 | OUTPATIENT
Start: 2020-10-02

## 2020-10-02 RX ORDER — GLIMEPIRIDE 4 MG/1
TABLET ORAL
Qty: 90 TABLET | Refills: 1 | OUTPATIENT
Start: 2020-10-02

## 2020-10-02 RX ORDER — SIMVASTATIN 40 MG
TABLET ORAL
Qty: 90 TABLET | Refills: 1 | OUTPATIENT
Start: 2020-10-02

## 2020-10-02 RX ORDER — LISINOPRIL 40 MG/1
TABLET ORAL
Qty: 90 TABLET | Refills: 1 | OUTPATIENT
Start: 2020-10-02

## 2020-10-02 RX ORDER — HYDROCHLOROTHIAZIDE 25 MG/1
TABLET ORAL
Qty: 90 TABLET | Refills: 1 | OUTPATIENT
Start: 2020-10-02

## 2020-11-06 ENCOUNTER — HOSPITAL ENCOUNTER (EMERGENCY)
Facility: HOSPITAL | Age: 80
Discharge: HOME | End: 2020-11-06
Attending: EMERGENCY MEDICINE | Admitting: EMERGENCY MEDICINE

## 2020-11-06 ENCOUNTER — APPOINTMENT (OUTPATIENT)
Dept: GENERAL RADIOLOGY | Facility: HOSPITAL | Age: 80
End: 2020-11-06

## 2020-11-06 VITALS
DIASTOLIC BLOOD PRESSURE: 77 MMHG | TEMPERATURE: 96.8 F | OXYGEN SATURATION: 99 % | BODY MASS INDEX: 22.91 KG/M2 | HEART RATE: 58 BPM | HEIGHT: 67 IN | RESPIRATION RATE: 17 BRPM | WEIGHT: 146 LBS | SYSTOLIC BLOOD PRESSURE: 152 MMHG

## 2020-11-06 DIAGNOSIS — S20.222A CONTUSION OF LEFT SIDE OF BACK, INITIAL ENCOUNTER: Primary | ICD-10-CM
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
## 2020-11-06 PROCEDURE — 71101 X-RAY EXAM UNILAT RIBS/CHEST: CPT

## 2020-11-06 PROCEDURE — 72110 X-RAY EXAM L-2 SPINE 4/>VWS: CPT

## 2020-11-06 PROCEDURE — 72072 X-RAY EXAM THORAC SPINE 3VWS: CPT

## 2020-11-06 PROCEDURE — 99282 EMERGENCY DEPT VISIT SF MDM: CPT

## 2020-11-06 RX ORDER — ESCITALOPRAM OXALATE 20 MG/1
20 TABLET ORAL DAILY
COMMUNITY

## 2020-11-06 RX ORDER — SENNA PLUS 8.6 MG/1
1 TABLET ORAL DAILY
COMMUNITY

## 2020-11-06 RX ORDER — QUETIAPINE FUMARATE 25 MG/1
25 TABLET, FILM COATED ORAL NIGHTLY
COMMUNITY

## 2020-11-06 RX ORDER — HYDROCODONE BITARTRATE AND ACETAMINOPHEN 5; 325 MG/1; MG/1
1 TABLET ORAL EVERY 4 HOURS PRN
COMMUNITY

## 2020-11-06 RX ORDER — HYDROCODONE BITARTRATE AND ACETAMINOPHEN 7.5; 325 MG/1; MG/1
1 TABLET ORAL ONCE
Status: DISCONTINUED | OUTPATIENT
Start: 2020-11-06 | End: 2020-11-06 | Stop reason: HOSPADM

## 2020-11-06 NOTE — ED PROVIDER NOTES
EMERGENCY DEPARTMENT ENCOUNTER    Room Number:  37/37  Date of encounter:  11/6/2020  PCP: Shantell Alvarez, ADRIEN  Historian: Patient     I used full protective equipment while examining this patient.  This includes face mask, gloves and protective eyewear.  I washed my hands before entering the room and immediately upon leaving the room.  Patient was wearing a surgical mask.      HPI:  Chief Complaint: Mid back pain  A complete HPI/ROS/PMH/PSH/SH/FH are unobtainable due to: None    Context: Courtney Medrano is a 80 y.o. male who presents to the ED c/o of mid back pain after falling yesterday.  Patient states he lost his balance and fell.  He hit his back on the edge of a cabinet.  He denies hitting his head, loss of consciousness, shortness of breath, neck pain, abdominal pain, shortness of breath,  extremity pain, or focal weakness..  Pain is constant.  It is worse with movement.  It is moderate in intensity.  Patient is on Eliquis.      PAST MEDICAL HISTORY  Active Ambulatory Problems     Diagnosis Date Noted   • Anxiety disorder 12/18/2015   • Recurrent major depressive disorder, in full remission (CMS/[facility]) 12/18/2015   • Constipation 12/18/2015   • Diabetes type 2, uncontrolled (CMS/[facility]) 12/18/2015   • GERD (gastroesophageal reflux disease) 12/18/2015   • Hyperlipidemia 12/18/2015   • Low back pain 12/18/2015   • Prostatitis 12/18/2015   • Pulmonary nodule 12/18/2015   • Diabetic peripheral neuropathy (CMS/[facility]) 02/22/2016   • Chronic fatigue 02/22/2016   • Vitamin D deficiency 05/24/2016   • Noncompliance of patient with dietary regimen 05/24/2016   • Uncontrolled type 2 diabetes mellitus (CMS/[facility]) 10/04/2016   • Constipation 03/07/2017   • Leg pain 03/07/2017   • Benign essential HTN 07/27/2018   • Noncompliance with medications 07/27/2018   • Perirectal abscess 02/01/2019   • Anxiety 02/01/2019   • CKD (chronic kidney disease) stage 3, GFR 30-59 ml/min 07/29/2019   • Anemia of chronic disease 07/29/2019   •  Low hemoglobin 07/29/2019   • PAD (peripheral artery disease) (CMS/[facility]) 07/29/2019   • Polyp of colon 07/29/2019   • Pyelonephritis 01/11/2020   • Hematuria 10/29/2019   • Tobacco use 08/29/2019   • Type 2 diabetes mellitus with diabetic chronic kidney disease (CMS/[facility]) 08/29/2019   • Contusion of rib on right side 02/11/2020   • Weight loss 02/28/2020   • Generalized weakness 09/05/2020   • Hyperkalemia 09/05/2020   • TIAGO (acute kidney injury) (CMS/[facility]) 09/05/2020   • Current chronic use of systemic steroids 09/06/2020   • Failure to thrive in adult 09/06/2020   • Chronic anticoagulation 09/06/2020     Resolved Ambulatory Problems     Diagnosis Date Noted   • No Resolved Ambulatory Problems     Past Medical History:   Diagnosis Date   • Type 2 diabetes mellitus (CMS/[facility])          PAST SURGICAL HISTORY  Past Surgical History:   Procedure Laterality Date   • BACK SURGERY     • CYSTOSCOPY W/ URETERAL STENT PLACEMENT Left 1/11/2020    Procedure: CYSTOSCOPY URETERAL CATHETER/STENT INSERTION;  Surgeon: Clark Ramirez MD;  Location: [facility];  Service: Urology   • KIDNEY STONE SURGERY     • URINARY SURGERY      stent placement         FAMILY HISTORY  No family history on file.      SOCIAL HISTORY  Social History     Socioeconomic History   • Marital status:      Spouse name: Not on file   • Number of children: Not on file   • Years of education: Not on file   • Highest education level: Not on file   Tobacco Use   • Smoking status: Current Every Day Smoker     Types: Cigars   • Smokeless tobacco: Never Used   • Tobacco comment: 1 cigar/day   Substance and Sexual Activity   • Alcohol use: No   • Drug use: No   • Sexual activity: Defer         ALLERGIES  Flexeril [cyclobenzaprine]       REVIEW OF SYSTEMS  Review of Systems      All systems have been reviewed and are negative except as as discussed in the HPI    PHYSICAL EXAM    I have reviewed the triage vital signs and nursing notes.    ED Triage Vitals    Temp Heart Rate Resp BP SpO2   11/06/20 0932 11/06/20 0932 11/06/20 0932 11/06/20 0940 11/06/20 0932   96.8 °F (36 °C) 68 18 128/67 95 %      Temp src Heart Rate Source Patient Position BP Location FiO2 (%)   11/06/20 0932 11/06/20 0932 -- -- --   Tympanic Monitor          Physical Exam  GENERAL: Awake, alert  HENT: NCAT, nares patent, moist mucous membranes  NECK: supple, no cervical spine tenderness  EYES: no scleral icterus  CV: regular rhythm, regular rate, no murmur  RESPIRATORY: normal effort, clear to auscultation bilaterally  ABDOMEN: soft, nontender, no CVA tenderness  MUSCULOSKELETAL: There is a small tender ecchymosis just left of midline in the mid back.  There is tenderness over the lower thoracic spine.  No step-offs.  Extremities are nontender and without obvious deformity.  There is normal range of motion in all extremities.  Pelvis is stable.  Chest is nontender.  NEURO: Strength, sensation, and coordination are grossly intact.  Speech and mentation are unremarkable.  No facial droop.  SKIN: warm, dry, no rash  PSYCH: Normal mood and affect      LAB RESULTS  No results found for this or any previous visit (from the past 24 hour(s)).    Ordered the above labs and independently reviewed the results.      RADIOLOGY  Xr Ribs Left With Pa Chest    Result Date: 11/6/2020  EMERGENCY CHEST AND LEFT RIB SERIES, 3 VIEWS OF THE THORACIC SPINE, AND 5 VIEWS OF THE LUMBAR SPINE 11/06/2020  CLINICAL HISTORY: Patient fell yesterday on his back, hit door handle on the way down, complaining of left-sided rib pain and mid to low back pain.  FINDINGS:  CHEST AND LEFT RIBS: Single PA view of the chest as well as 4 dedicated views of the left ribs are submitted for interpretation. The cardiomediastinal silhouette and the pulmonary vasculature are within normal limits. The lungs are clear. The costophrenic angles are sharp. No definite acute left rib fracture is seen and no pneumothorax is identified.      No active  disease is seen in the chest and no convincing acute left rib fracture is identified.  LUMBAR SPINE PLAIN FILMS: A total of 5 views of the lumbar spine including AP, bilateral oblique and lateral views of the entire lumbar spine and coned down lateral view of the lumbosacral junction are submitted for interpretation. On the lateral view of the lumbar spine the lumbar vertebral body heights are well-maintained. There is a moderate old compression fracture involving the superior body and endplate of T12 with about 40% loss of anterior vertebral body height and this compression fracture was present back on a chest CT 02/26/2020 and is an old compression fracture. On the oblique views the pars interarticularis are intact on the AP view. The visualized pedicles, transverse and spinous processes are intact.  IMPRESSION: No acute fracture is seen in the lumbar spine. There is an old compression fracture involving the superior body and endplate of T12 with 40% loss of anterior vertebral body height and this fracture was present back on chest CT 02/26/2020 and appears stable.  THORACIC SPINE PLAIN FILMS: 3 views of the thoracic spine including AP and lateral views of the entire thoracic spine with swimmer's lateral view of the lower cervical and upper thoracic spine are submitted for interpretation.  COMPARISON: This is correlated to sagittal recons on a chest CT 02/26/2020. Reidentified is moderate compression fracture involving the superior body and endplate of the T12 thoracic level with 40% to 50% loss of anterior and 10% loss of posterior vertebral body heights, unchanged. The remainder of the thoracic vertebral body heights are well-maintained with no additional compression fracture seen in the thoracic spine.  IMPRESSION:   No acute fracture is seen in the thoracic spine. There is moderate old compression fracture involving the superior body and endplate of the T12 thoracic level with 40% to 50% loss of anterior  vertebral body height, unchanged since chest CT 02/26/2020.        Xr Spine Thoracic 3 View    Result Date: 11/6/2020  EMERGENCY CHEST AND LEFT RIB SERIES, 3 VIEWS OF THE THORACIC SPINE, AND 5 VIEWS OF THE LUMBAR SPINE 11/06/2020  CLINICAL HISTORY: Patient fell yesterday on his back, hit door handle on the way down, complaining of left-sided rib pain and mid to low back pain.  FINDINGS:  CHEST AND LEFT RIBS: Single PA view of the chest as well as 4 dedicated views of the left ribs are submitted for interpretation. The cardiomediastinal silhouette and the pulmonary vasculature are within normal limits. The lungs are clear. The costophrenic angles are sharp. No definite acute left rib fracture is seen and no pneumothorax is identified.      No active disease is seen in the chest and no convincing acute left rib fracture is identified.  LUMBAR SPINE PLAIN FILMS: A total of 5 views of the lumbar spine including AP, bilateral oblique and lateral views of the entire lumbar spine and coned down lateral view of the lumbosacral junction are submitted for interpretation. On the lateral view of the lumbar spine the lumbar vertebral body heights are well-maintained. There is a moderate old compression fracture involving the superior body and endplate of T12 with about 40% loss of anterior vertebral body height and this compression fracture was present back on a chest CT 02/26/2020 and is an old compression fracture. On the oblique views the pars interarticularis are intact on the AP view. The visualized pedicles, transverse and spinous processes are intact.  IMPRESSION: No acute fracture is seen in the lumbar spine. There is an old compression fracture involving the superior body and endplate of T12 with 40% loss of anterior vertebral body height and this fracture was present back on chest CT 02/26/2020 and appears stable.  THORACIC SPINE PLAIN FILMS: 3 views of the thoracic spine including AP and lateral views of the entire  thoracic spine with swimmer's lateral view of the lower cervical and upper thoracic spine are submitted for interpretation.  COMPARISON: This is correlated to sagittal recons on a chest CT 02/26/2020. Reidentified is moderate compression fracture involving the superior body and endplate of the T12 thoracic level with 40% to 50% loss of anterior and 10% loss of posterior vertebral body heights, unchanged. The remainder of the thoracic vertebral body heights are well-maintained with no additional compression fracture seen in the thoracic spine.  IMPRESSION:   No acute fracture is seen in the thoracic spine. There is moderate old compression fracture involving the superior body and endplate of the T12 thoracic level with 40% to 50% loss of anterior vertebral body height, unchanged since chest CT 02/26/2020.        Xr Spine Lumbar Complete 4+vw    Result Date: 11/6/2020  EMERGENCY CHEST AND LEFT RIB SERIES, 3 VIEWS OF THE THORACIC SPINE, AND 5 VIEWS OF THE LUMBAR SPINE 11/06/2020  CLINICAL HISTORY: Patient fell yesterday on his back, hit door handle on the way down, complaining of left-sided rib pain and mid to low back pain.  FINDINGS:  CHEST AND LEFT RIBS: Single PA view of the chest as well as 4 dedicated views of the left ribs are submitted for interpretation. The cardiomediastinal silhouette and the pulmonary vasculature are within normal limits. The lungs are clear. The costophrenic angles are sharp. No definite acute left rib fracture is seen and no pneumothorax is identified.      No active disease is seen in the chest and no convincing acute left rib fracture is identified.  LUMBAR SPINE PLAIN FILMS: A total of 5 views of the lumbar spine including AP, bilateral oblique and lateral views of the entire lumbar spine and coned down lateral view of the lumbosacral junction are submitted for interpretation. On the lateral view of the lumbar spine the lumbar vertebral body heights are well-maintained. There is a  moderate old compression fracture involving the superior body and endplate of T12 with about 40% loss of anterior vertebral body height and this compression fracture was present back on a chest CT 02/26/2020 and is an old compression fracture. On the oblique views the pars interarticularis are intact on the AP view. The visualized pedicles, transverse and spinous processes are intact.  IMPRESSION: No acute fracture is seen in the lumbar spine. There is an old compression fracture involving the superior body and endplate of T12 with 40% loss of anterior vertebral body height and this fracture was present back on chest CT 02/26/2020 and appears stable.  THORACIC SPINE PLAIN FILMS: 3 views of the thoracic spine including AP and lateral views of the entire thoracic spine with swimmer's lateral view of the lower cervical and upper thoracic spine are submitted for interpretation.  COMPARISON: This is correlated to sagittal recons on a chest CT 02/26/2020. Reidentified is moderate compression fracture involving the superior body and endplate of the T12 thoracic level with 40% to 50% loss of anterior and 10% loss of posterior vertebral body heights, unchanged. The remainder of the thoracic vertebral body heights are well-maintained with no additional compression fracture seen in the thoracic spine.  IMPRESSION:   No acute fracture is seen in the thoracic spine. There is moderate old compression fracture involving the superior body and endplate of the T12 thoracic level with 40% to 50% loss of anterior vertebral body height, unchanged since chest CT 02/26/2020.          I ordered the above noted radiological studies. Reviewed by me and discussed with radiologist.  See dictation for official radiology interpretation.      PROCEDURES  Procedures      MEDICATIONS GIVEN IN ER    Medications   HYDROcodone-acetaminophen (NORCO) 7.5-325 MG per tablet 1 tablet (1 tablet Oral Not Given 11/6/20 0959)         PROGRESS, DATA ANALYSIS,  CONSULTS, AND MEDICAL DECISION MAKING    All labs have been independently reviewed by me.  All radiology studies have been reviewed by me and discussed with radiologist dictating the report.   EKG's independently viewed and interpreted by me.  I have reviewed the nurse's notes, vital signs, past medical history, and medication list.  Discussion below represents my analysis of pertinent findings related to patient's condition, differential diagnosis, treatment plan and final disposition.      ED Course as of Nov 06 1308   Fri Nov 06, 2020   0938 Old records reviewed.  Patient was last admitted here in September 2020 for acute kidney injury, generalized weakness, and failure to thrive.  He was last seen in the ER in January 2020 after falling.  He was found to have an acute compression fracture of T12.    [WH]   1126 X-rays interpreted by the radiologist and independently viewed by me.  There is an old T12 compression fracture which is unchanged.  No acute fracture of the thoracic or lumbar spine or left ribs.    [WH]   1219 X-ray results discussed with the patient and his family.  Patient states he has pain medication at home.  I advised him to apply cold compresses as needed.  Return precautions were discussed.    [WH]      ED Course User Index  [WH] Frank Olvera MD       AS OF 13:08 EST VITALS:    BP - 152/77  HR - 58  TEMP - 96.8 °F (36 °C) (Tympanic)  O2 SATS - 99%      DIAGNOSIS  Final diagnoses:   Contusion of left side of back, initial encounter   Fall, initial encounter         DISPOSITION  Discharge    DISCHARGE    Patient discharged in stable condition.    Reviewed implications of results, diagnosis, meds, responsibility to follow up, warning signs and symptoms of possible worsening, potential complications and reasons to return to ER, including worsening pain, new numbness/tingling/weakness, shortness of breath, or other concern..    Patient/Family voiced understanding of above  instructions.    Discussed plan for discharge, as there is no emergent indication for admission. Patient referred to primary care provider for BP management due to today's BP. Pt/family is agreeable and understands need for follow up and repeat testing.  Pt is aware that discharge does not mean that nothing is wrong but it indicates no emergency is present that requires admission and they must continue care with follow-up as given below or physician of their choice.     FOLLOW-UP  Shantell Alvarez, ADRIEN  [address] [address]  [phone]    Call in 3 days  If symptoms persist         Medication List      Changed    amLODIPine 10 MG tablet  Commonly known as: NORVASC  Take 1 tablet by mouth Daily.  What changed: how much to take     apixaban 2.5 MG tablet tablet  Commonly known as: ELIQUIS  Take 1 tablet by mouth Every 12 (Twelve) Hours.  What changed: how much to take     Insulin Glargine 100 UNIT/ML injection pen  Commonly known as: LANTUS SOLOSTAR  Inject 25 Units under the skin into the appropriate area as directed Every Night.  What changed: how much to take              Dictated utilizing Dragon dictation:  Much of this encounter note is an electronic transcription/translation of spoken language to printed text. The electronic translation of spoken language may permit erroneous, or at times, nonsensical words or phrases to be inadvertently transcribed; Although I have reviewed the note for such errors, some may still exist.     Frank Olvera MD  11/06/20 [id number]

## 2020-11-06 NOTE — DISCHARGE INSTRUCTIONS
Continue taking your home pain medication.  Apply cold compresses to affected area as needed.  Return to emergency department for worsening pain, shortness of breath, or other concern.  Follow-up with your primary care provider if symptoms persist.

## 2020-11-06 NOTE — ED TRIAGE NOTES
Pt fell on his back yesterday morning, states he hit a door handle in the middle of his back on the L side and is having worsening pain localized to that area now. Pt wore a mask and this RN wore a mask in triage .

## 2020-11-10 ENCOUNTER — EPISODE CHANGES (OUTPATIENT)
Dept: CASE MANAGEMENT | Facility: OTHER | Age: 80
End: 2020-11-10

## 2020-11-11 ENCOUNTER — APPOINTMENT (OUTPATIENT)
Dept: CT IMAGING | Facility: HOSPITAL | Age: 80
End: 2020-11-11

## 2020-11-11 ENCOUNTER — HOSPITAL ENCOUNTER (EMERGENCY)
Facility: HOSPITAL | Age: 80
Discharge: HOME | End: 2020-11-11
Attending: EMERGENCY MEDICINE | Admitting: EMERGENCY MEDICINE

## 2020-11-11 VITALS
BODY MASS INDEX: 22.76 KG/M2 | SYSTOLIC BLOOD PRESSURE: 185 MMHG | DIASTOLIC BLOOD PRESSURE: 80 MMHG | TEMPERATURE: 98.5 F | WEIGHT: 145 LBS | OXYGEN SATURATION: 97 % | RESPIRATION RATE: 16 BRPM | HEART RATE: 82 BPM | HEIGHT: 67 IN

## 2020-11-11 DIAGNOSIS — K59.00 CONSTIPATION, UNSPECIFIED CONSTIPATION TYPE: ICD-10-CM

## 2020-11-11 DIAGNOSIS — S22.080A COMPRESSION FRACTURE OF T12 VERTEBRA, INITIAL ENCOUNTER (HCC): Primary | ICD-10-CM

## 2020-11-11 DIAGNOSIS — S32.010A CLOSED COMPRESSION FRACTURE OF BODY OF L1 VERTEBRA (HCC): ICD-10-CM

## 2020-11-11 LAB
ALBUMIN SERPL-MCNC: 4.3 G/DL (ref 3.5–5.2)
ALBUMIN/GLOB SERPL: 1.6 G/DL
ALP SERPL-CCNC: 67 U/L (ref 39–117)
ALT SERPL W P-5'-P-CCNC: 7 U/L (ref 1–41)
ANION GAP SERPL CALCULATED.3IONS-SCNC: 11.3 MMOL/L (ref 5–15)
AST SERPL-CCNC: 12 U/L (ref 1–40)
BASOPHILS # BLD AUTO: 0.04 10*3/MM3 (ref 0–0.2)
BASOPHILS NFR BLD AUTO: 0.5 % (ref 0–1.5)
BILIRUB SERPL-MCNC: 0.3 MG/DL (ref 0–1.2)
BUN SERPL-MCNC: 31 MG/DL (ref 8–23)
BUN/CREAT SERPL: 16.9 (ref 7–25)
CALCIUM SPEC-SCNC: 10.2 MG/DL (ref 8.6–10.5)
CHLORIDE SERPL-SCNC: 101 MMOL/L (ref 98–107)
CO2 SERPL-SCNC: 26.7 MMOL/L (ref 22–29)
CREAT SERPL-MCNC: 1.83 MG/DL (ref 0.76–1.27)
DEPRECATED RDW RBC AUTO: 42.2 FL (ref 37–54)
EOSINOPHIL # BLD AUTO: 0.11 10*3/MM3 (ref 0–0.4)
EOSINOPHIL NFR BLD AUTO: 1.2 % (ref 0.3–6.2)
ERYTHROCYTE [DISTWIDTH] IN BLOOD BY AUTOMATED COUNT: 12.6 % (ref 12.3–15.4)
GFR SERPL CREATININE-BSD FRML MDRD: 36 ML/MIN/1.73
GLOBULIN UR ELPH-MCNC: 2.7 GM/DL
GLUCOSE SERPL-MCNC: 103 MG/DL (ref 65–99)
HCT VFR BLD AUTO: 34.9 % (ref 37.5–51)
HGB BLD-MCNC: 11.4 G/DL (ref 13–17.7)
IMM GRANULOCYTES # BLD AUTO: 0.05 10*3/MM3 (ref 0–0.05)
IMM GRANULOCYTES NFR BLD AUTO: 0.6 % (ref 0–0.5)
LIPASE SERPL-CCNC: 39 U/L (ref 13–60)
LYMPHOCYTES # BLD AUTO: 2.07 10*3/MM3 (ref 0.7–3.1)
LYMPHOCYTES NFR BLD AUTO: 23.3 % (ref 19.6–45.3)
MCH RBC QN AUTO: 30 PG (ref 26.6–33)
MCHC RBC AUTO-ENTMCNC: 32.7 G/DL (ref 31.5–35.7)
MCV RBC AUTO: 91.8 FL (ref 79–97)
MONOCYTES # BLD AUTO: 0.91 10*3/MM3 (ref 0.1–0.9)
MONOCYTES NFR BLD AUTO: 10.3 % (ref 5–12)
NEUTROPHILS NFR BLD AUTO: 5.69 10*3/MM3 (ref 1.7–7)
NEUTROPHILS NFR BLD AUTO: 64.1 % (ref 42.7–76)
NRBC BLD AUTO-RTO: 0 /100 WBC (ref 0–0.2)
PLATELET # BLD AUTO: 209 10*3/MM3 (ref 140–450)
PMV BLD AUTO: 10.3 FL (ref 6–12)
POTASSIUM SERPL-SCNC: 4.3 MMOL/L (ref 3.5–5.2)
PROT SERPL-MCNC: 7 G/DL (ref 6–8.5)
RBC # BLD AUTO: 3.8 10*6/MM3 (ref 4.14–5.8)
SODIUM SERPL-SCNC: 139 MMOL/L (ref 136–145)
WBC # BLD AUTO: 8.87 10*3/MM3 (ref 3.4–10.8)

## 2020-11-11 PROCEDURE — 74176 CT ABD & PELVIS W/O CONTRAST: CPT

## 2020-11-11 PROCEDURE — 99283 EMERGENCY DEPT VISIT LOW MDM: CPT

## 2020-11-11 PROCEDURE — 85025 COMPLETE CBC W/AUTO DIFF WBC: CPT | Performed by: PHYSICIAN ASSISTANT

## 2020-11-11 PROCEDURE — 80053 COMPREHEN METABOLIC PANEL: CPT | Performed by: PHYSICIAN ASSISTANT

## 2020-11-11 PROCEDURE — 83690 ASSAY OF LIPASE: CPT | Performed by: PHYSICIAN ASSISTANT

## 2020-11-11 RX ORDER — SODIUM CHLORIDE 0.9 % (FLUSH) 0.9 %
10 SYRINGE (ML) INJECTION AS NEEDED
Status: DISCONTINUED | OUTPATIENT
Start: 2020-11-11 | End: 2020-11-11 | Stop reason: HOSPADM

## 2020-11-11 RX ORDER — POLYETHYLENE GLYCOL 3350 17 G/17G
17 POWDER, FOR SOLUTION ORAL DAILY
Qty: 507 G | Refills: 0 | Status: SHIPPED | OUTPATIENT
Start: 2020-11-11 | End: 2020-11-25 | Stop reason: SDDI

## 2020-11-11 NOTE — ED NOTES
Patient was placed in face mask in first look.  Patient was wearing a face mask throughout our encounter.  I wore protective eye protection throughout the encounter.  Hand hygiene was performed before and after patient encounter.        Atul Patton RN  11/11/20 [id number]

## 2020-11-11 NOTE — ED NOTES
Pt presents to ED via POV from home w/cc back pain r/t falls.  Pt complains of lower back pain x 2 days.  Pt states he has had a few falls over the past couple of days and is now experiencing back pain.  Pt seen at this ER previously for same complaint, however states this is worse.  Pt masked at triage  Triage completed with appropriate PPE       Yesy Lou RN  11/11/20 0683

## 2020-11-11 NOTE — ED NOTES
Pt brought in w/ his son w/ c/o lower back pain x this am. Pt hd fallen last week and seen here and sent home with pain medicine. Son states he thinks it could be due to no BM x 4 or 5 days      Asiya Awad, RN  11/11/20 2147

## 2020-11-11 NOTE — ED PROVIDER NOTES
EMERGENCY DEPARTMENT ENCOUNTER    Room Number:  02/02  Date of encounter:  11/11/2020  PCP: Shantell Alvarez, ADRIEN  Historian: Patient/son      I used full protective equipment while examining this patient.  This includes face mask, gloves and protective eyewear.  I washed my hands before entering the room and immediately upon leaving the room      HPI:  Chief Complaint: Low back pain  A complete HPI/ROS/PMH/PSH/SH/FH are unobtainable due to: Nothing    Context: Courtney Medrano is a 80 y.o. male who presents to the ED c/o 2-day history of acute worsening low back pain on chronic low back pain.  Patient states he has chronic low back pain and takes pain medicine daily.  In addition patient had a mechanical fall on 11/6/2020.  Patient was seen in the ER at that time.  Patient had a negative lumbar spine and left rib x-ray series, as well as the thoracic spine series that showed a chronic T12 compression fracture.  According to son, the patient symptoms were improving until last night.  Patient this time has severe left of midline lumbar pain.  This pain is worse when he moves, and improves with rest.  He states the pain does seem to radiate around to the front of his groin.  He denies any numbness or tingling to his extremities.  He denies any dysuria.  Patient states he has been constipated for the past 5 days without a bowel movement.    Review of Medical Records  I reviewed ER visit from 11/6/2020.    PAST MEDICAL HISTORY  Active Ambulatory Problems     Diagnosis Date Noted   • Anxiety disorder 12/18/2015   • Recurrent major depressive disorder, in full remission (CMS/[facility]) 12/18/2015   • Constipation 12/18/2015   • Diabetes type 2, uncontrolled (CMS/[facility]) 12/18/2015   • GERD (gastroesophageal reflux disease) 12/18/2015   • Hyperlipidemia 12/18/2015   • Low back pain 12/18/2015   • Prostatitis 12/18/2015   • Pulmonary nodule 12/18/2015   • Diabetic peripheral neuropathy (CMS/[facility]) 02/22/2016   • Chronic fatigue  02/22/2016   • Vitamin D deficiency 05/24/2016   • Noncompliance of patient with dietary regimen 05/24/2016   • Uncontrolled type 2 diabetes mellitus (CMS/[facility]) 10/04/2016   • Constipation 03/07/2017   • Leg pain 03/07/2017   • Benign essential HTN 07/27/2018   • Noncompliance with medications 07/27/2018   • Perirectal abscess 02/01/2019   • Anxiety 02/01/2019   • CKD (chronic kidney disease) stage 3, GFR 30-59 ml/min 07/29/2019   • Anemia of chronic disease 07/29/2019   • Low hemoglobin 07/29/2019   • PAD (peripheral artery disease) (CMS/[facility]) 07/29/2019   • Polyp of colon 07/29/2019   • Pyelonephritis 01/11/2020   • Hematuria 10/29/2019   • Tobacco use 08/29/2019   • Type 2 diabetes mellitus with diabetic chronic kidney disease (CMS/[facility]) 08/29/2019   • Contusion of rib on right side 02/11/2020   • Weight loss 02/28/2020   • Generalized weakness 09/05/2020   • Hyperkalemia 09/05/2020   • TIAGO (acute kidney injury) (CMS/[facility]) 09/05/2020   • Current chronic use of systemic steroids 09/06/2020   • Failure to thrive in adult 09/06/2020   • Chronic anticoagulation 09/06/2020     Resolved Ambulatory Problems     Diagnosis Date Noted   • No Resolved Ambulatory Problems     Past Medical History:   Diagnosis Date   • Type 2 diabetes mellitus (CMS/[facility])          PAST SURGICAL HISTORY  Past Surgical History:   Procedure Laterality Date   • BACK SURGERY     • CYSTOSCOPY W/ URETERAL STENT PLACEMENT Left 1/11/2020    Procedure: CYSTOSCOPY URETERAL CATHETER/STENT INSERTION;  Surgeon: Clark Ramirez MD;  Location: [facility];  Service: Urology   • KIDNEY STONE SURGERY     • URINARY SURGERY      stent placement         FAMILY HISTORY  History reviewed. No pertinent family history.      SOCIAL HISTORY  Social History     Socioeconomic History   • Marital status:      Spouse name: Not on file   • Number of children: Not on file   • Years of education: Not on file   • Highest education level: Not on file   Tobacco Use   •  Smoking status: Current Every Day Smoker     Types: Cigars   • Smokeless tobacco: Never Used   • Tobacco comment: 1 cigar/day   Substance and Sexual Activity   • Alcohol use: No   • Drug use: No   • Sexual activity: Defer         ALLERGIES  Flexeril [cyclobenzaprine]        REVIEW OF SYSTEMS  All systems reviewed and negative except for those discussed in HPI.       PHYSICAL EXAM    I have reviewed the triage vital signs and nursing notes.    ED Triage Vitals [11/11/20 0601]   Temp Heart Rate Resp BP SpO2   98.5 °F (36.9 °C) 88 18 -- 96 %      Temp src Heart Rate Source Patient Position BP Location FiO2 (%)   -- -- -- -- --       Physical Exam  GENERAL: Alert and oriented, mild discomfort   HENT: head atraumatic, no nuchal rigidity  EYES: no scleral icterus, EOMI  CV: regular rhythm, regular rate, no murmur  RESPIRATORY: normal effort, CTA  ABDOMEN: soft, nontender  MUSCULOSKELETAL: Mild decreased range of motion of lumbar spine.  Mild upper lumbar tenderness without step-off  NEURO: 5/5 strength in lower extremities.  Sensation grossly intact.  2+ pedal pulses distally.  Negative straight leg raise bilaterally  SKIN: warm, dry        LAB RESULTS  Recent Results (from the past 24 hour(s))   Comprehensive Metabolic Panel    Collection Time: 11/11/20  7:46 AM    Specimen: Blood   Result Value Ref Range    Glucose 103 (H) 65 - 99 mg/dL    BUN 31 (H) 8 - 23 mg/dL    Creatinine 1.83 (H) 0.76 - 1.27 mg/dL    Sodium 139 136 - 145 mmol/L    Potassium 4.3 3.5 - 5.2 mmol/L    Chloride 101 98 - 107 mmol/L    CO2 26.7 22.0 - 29.0 mmol/L    Calcium 10.2 8.6 - 10.5 mg/dL    Total Protein 7.0 6.0 - 8.5 g/dL    Albumin 4.30 3.50 - 5.20 g/dL    ALT (SGPT) 7 1 - 41 U/L    AST (SGOT) 12 1 - 40 U/L    Alkaline Phosphatase 67 39 - 117 U/L    Total Bilirubin 0.3 0.0 - 1.2 mg/dL    eGFR Non African Amer 36 (L) >60 mL/min/1.73    Globulin 2.7 gm/dL    A/G Ratio 1.6 g/dL    BUN/Creatinine Ratio 16.9 7.0 - 25.0    Anion Gap 11.3 5.0 - 15.0  mmol/L   Lipase    Collection Time: 11/11/20  7:46 AM    Specimen: Blood   Result Value Ref Range    Lipase 39 13 - 60 U/L   CBC Auto Differential    Collection Time: 11/11/20  7:46 AM    Specimen: Blood   Result Value Ref Range    WBC 8.87 3.40 - 10.80 10*3/mm3    RBC 3.80 (L) 4.14 - 5.80 10*6/mm3    Hemoglobin 11.4 (L) 13.0 - 17.7 g/dL    Hematocrit 34.9 (L) 37.5 - 51.0 %    MCV 91.8 79.0 - 97.0 fL    MCH 30.0 26.6 - 33.0 pg    MCHC 32.7 31.5 - 35.7 g/dL    RDW 12.6 12.3 - 15.4 %    RDW-SD 42.2 37.0 - 54.0 fl    MPV 10.3 6.0 - 12.0 fL    Platelets 209 140 - 450 10*3/mm3    Neutrophil % 64.1 42.7 - 76.0 %    Lymphocyte % 23.3 19.6 - 45.3 %    Monocyte % 10.3 5.0 - 12.0 %    Eosinophil % 1.2 0.3 - 6.2 %    Basophil % 0.5 0.0 - 1.5 %    Immature Grans % 0.6 (H) 0.0 - 0.5 %    Neutrophils, Absolute 5.69 1.70 - 7.00 10*3/mm3    Lymphocytes, Absolute 2.07 0.70 - 3.10 10*3/mm3    Monocytes, Absolute 0.91 (H) 0.10 - 0.90 10*3/mm3    Eosinophils, Absolute 0.11 0.00 - 0.40 10*3/mm3    Basophils, Absolute 0.04 0.00 - 0.20 10*3/mm3    Immature Grans, Absolute 0.05 0.00 - 0.05 10*3/mm3    nRBC 0.0 0.0 - 0.2 /100 WBC       Ordered the above labs and independently reviewed the results.        RADIOLOGY  Ct Abdomen Pelvis Without Contrast    Result Date: 11/11/2020  CT ABDOMEN AND PELVIS WITHOUT CONTRAST  HISTORY: Lower abdominal pain with distention.  TECHNIQUE: Axial CT images of the abdomen and pelvis were obtained without administration of intravenous contrast. The patient was not given oral contrast. Coronal and sagittal reformats were obtained.  COMPARISON: CT abdomen and pelvis from 01/11/2020.  FINDINGS: Large amount of formed stool is seen throughout the colon, most suggestive of constipation. Majority of the stool burden is proximal. The small bowel loops demonstrate normal caliber.  Noncontrast attenuation of the liver is normal. No focal hepatic lesions identified on noncontrast imaging. The gallbladder is  unremarkable. The pancreas is normal without ductal dilatation. The spleen is normal in size. There is a small focal hypoattenuating subcentimeter lesion seen within the spleen on image 28, too small to accurately characterize. Low-density nodular thickening of the left adrenal gland is present. Both kidneys are normal in size and attenuation. Numerous renal vascular calcifications are present. Small punctate nephroliths may also be present. No hydronephrosis. The prostate gland is enlarged and indents the bladder base. There is marked calcified atherosclerotic plaque seen throughout the abdominal aorta and its branches. Infrarenal abdominal aorta is ectatic measuring up to 2.9 cm. Marked calcified plaque is seen in the region of the bilateral common iliac arteries. There are bilateral external iliac artery stents in place. There has been interval progression of the T12 compression fracture with nearly 50% anterior height loss now present. Pre and paravertebral haziness is demonstrated . There is also a new compression fracture of the L1 vertebral body with a horizontal fracture pain along the superior endplate. Minimal 5-10% anterior height loss is seen. There is new sclerosis seen within the posterolateral bilateral 11th rib that may represent subacute/healing fracture. Small sliding hiatal hernia is seen.      1. CT findings of marked constipation. 2. There is interval progression of the T12 compression deformity and new compression fracture along the superior L1 vertebral body. Pre and paravertebral haziness is demonstrated within these levels. Sclerosis seen within the posterolateral aspect of bilateral 11th rib likely representing subacute/healing rib fractures. 3. Left lower lobe atelectatic change with the filling defects within the left lower lobe bronchi that may represent mucous plugging versus other and follow-up with CT chest is recommended. 4. Prostatomegaly. 5. Marked calcified atherosclerotic disease  within the abdominal aorta and its branches.  These findings were discussed with Kiel Yung by telephone.  Radiation dose reduction techniques were utilized, including automated exposure control and exposure modulation based on body size.         I ordered the above noted radiological studies. Reviewed by me and discussed with radiologist.  See dictation for official radiology interpretation.      MEDICATIONS GIVEN IN ER    Medications   sodium chloride 0.9 % flush 10 mL (has no administration in time range)         PROGRESS, DATA ANALYSIS, CONSULTS, AND MEDICAL DECISION MAKING    All labs have been independently reviewed by me.  All radiology studies have been reviewed by me and discussed with radiologist dictating the report.   EKG's independently viewed and interpreted by me.  Discussion below represents my analysis of pertinent findings related to patient's condition, differential diagnosis, treatment plan and final disposition.    I have discussed case with Dr. Calderon, emergency room physician.  He has performed his own bedside examination and agrees with treatment plan.    ED Course as of Nov 11 1134   Wed Nov 11, 2020   0631 Patient presents with acute on chronic low back pain.  Patient did have a mechanical fall several days ago.  Differential diagnoses include but not limited to compression lumbar fracture, constipation, internal hemorrhage.    [EE]   0903 This was 1.4, 3 months ago.   Creatinine(!): 1.83 [EE]   0903 WBC: 8.87 [EE]   0903 Hemoglobin(!): 11.4 [EE]   0916 I discussed CT findings with Dr. Zaldivar.  Patient does have diffuse constipation.  There is evidence of progression of T12 compression fracture, as well as new minor L1 compression fracture.  Lastly there is some basilar atelectasis on the left that will need follow-up.    [EE]   0924 I updated patient and family on findings.  Plan to place patient in TLSO back brace, start patient MiraLAX, and have close follow-up.    I have offered  patient help with assisted living/nursing home facility however they state patient is in hospice and would prefer to stay at home.    [EE]      ED Course User Index  [EE] Kiel Yung PA       AS OF 11:34 EST VITALS:    BP - (!) 185/80  HR - 82  TEMP - 98.5 °F (36.9 °C)  O2 SATS - 97%        DIAGNOSIS  Final diagnoses:   Compression fracture of T12 vertebra, initial encounter (CMS/HCC)   Closed compression fracture of body of L1 vertebra (CMS/HCC)   Constipation, unspecified constipation type         DISPOSITION  Discharged           Kiel Yung PA  11/11/20 [id number]

## 2020-11-11 NOTE — ED PROVIDER NOTES
07:41 EST   Patient seen and examined with physician assistant.  Briefly patient presents for evaluation of low back pain.  Patient had a mechanical fall earlier this month and was seen here.  X-rays at that time were negative.  Patient has had increasing pain since then.  Patient now states he is unable to have a bowel movement.  Last bowel movement was 5 to 6 days ago.  Has having lower abdominal tenderness.      On exam patient is alert and cooperative and appears uncomfortable.  Patient having paroxysms of pain right back.  Patient has normal reflexes and normal sensation to his lower extremities.  Patient does have tenderness to lower back.    Plan will be discharge home      MD ATTESTATION NOTE    The NASRIN and I have discussed this patient's history, physical exam, and treatment plan.  I have reviewed the documentation and personally had a face to face interaction with the patient. I affirm the documentation and agree with the treatment and plan.  The attached note describes my personal findings.        Patient was wearing a face mask when I entered the room and they continued to wear a mask throughout their stay in the ED.  I wore PPE, including gloves, face mask with shield or face mask with goggles whenever I was in the room with patient.        Aamir Calderon MD  11/11/20 9809

## 2020-11-12 ENCOUNTER — PATIENT OUTREACH (OUTPATIENT)
Dept: CASE MANAGEMENT | Facility: OTHER | Age: 80
End: 2020-11-12

## 2020-11-12 ENCOUNTER — EPISODE CHANGES (OUTPATIENT)
Dept: CASE MANAGEMENT | Facility: OTHER | Age: 80
End: 2020-11-12

## 2020-11-12 NOTE — OUTREACH NOTE
Care Coordination Assessment    Documented/Reviewed By: Estefani Riley RN Date/time: 11/12/2020  2:58 PM   Assessment completed with: patient  Enrolled in care management program: No  Living arrangement: spouse  Support system: family, spouse  Type of residence: private residence  Home care services: Yes (Comment: [facility] and PT)  Equipment used at home: cane  Bed or wheelchair confined: No  Inadequate nutrition: No  Medication adherence problem: No  Experiencing side effects from current medications: No  History of fall(s) in last 6 months: Yes  Family aware of the patient's advance care planning wishes: Yes  Chronic pain: Yes

## 2020-11-12 NOTE — OUTREACH NOTE
Care Plan Note      Responses   Lifestyle Goals  Decrease falls risk, Exercise 150 min/wk - moderate activity, Fewer ER/urgent care visits, Increase physical activity, Less pain, Less sadness/anxiety, Medication management   Barriers  Not ready to change, Side effects of medication   Self Management  Medication Adherence   Annual Wellness Visit:   Patient Has Completed   Care Gaps Addressed  Flu Shot, Colon Cancer Screening   Colon Cancer Screening Type  Colonoscopy   Colonoscopy Status  Up to Date (< 10 yrs)   Colon Cancer Screening Completion at [facility] or Other  [facility]   Flu Shot Status  Up to Date   Flu Shot Completion at [facility] or Other  [facility]   Other Patient Education/Resources   24/7 [facility] Healthcare Nurse Call Line   24/7 Nurse Call Line Education Method  Verbal   Does patient have depression diagnosis?  Yes   Advanced Directives:  Patient Has   Medication Adherence  Medications understood   Goal Progress  Making Progress Toward Goal(s)   Health Literacy  Good        The main concerns and/or symptoms the patient would like to address are: denies needs    Education/instruction provided by Care Coordinator: Call to pt to fu ED visit after fall. Introduced self and role of ACM. Pt states he is doing well. He feels he is getting stronger with home health coming out to see him. He states [facility] health has SN and PT seeing him. Pt has had two Ed visits after falls. Discussed fall precautions with pt who verbalizes understanding. Pt difficult to engage answering most questions with a yes or no.   Per Ed notes pt had not had a BM for several days. Pt states the home health nurse was able to help him with that today. Discussed bowel regimine with pt. Encouraged good hydration and use of stool softeners or increasing  miralax when needed. Pt advised to follow up with his PCP after recent ED visits with falls.   No questions, concerns or needs regarding health wellness. Pt given number for 24/7  maye. Pt appreciative of phone call and declined to participate in care advising program. No needs identified.       Follow Up Outreach Due:none    Estefani Riley RN  Ambulatory     11/12/2020, 15:03 EST

## 2020-11-19 ENCOUNTER — OFFICE (OUTPATIENT)
Dept: URBAN - METROPOLITAN AREA CLINIC 75 | Facility: CLINIC | Age: 80
End: 2020-11-19
Payer: MEDICARE

## 2020-11-19 VITALS — HEIGHT: 67 IN

## 2020-11-19 DIAGNOSIS — D12.0 BENIGN NEOPLASM OF CECUM: ICD-10-CM

## 2020-11-19 DIAGNOSIS — K44.9 DIAPHRAGMATIC HERNIA WITHOUT OBSTRUCTION OR GANGRENE: ICD-10-CM

## 2020-11-19 DIAGNOSIS — K22.2 ESOPHAGEAL OBSTRUCTION: ICD-10-CM

## 2020-11-19 DIAGNOSIS — D12.3 BENIGN NEOPLASM OF TRANSVERSE COLON: ICD-10-CM

## 2020-11-19 DIAGNOSIS — K29.70 GASTRITIS, UNSPECIFIED, WITHOUT BLEEDING: ICD-10-CM

## 2020-11-19 DIAGNOSIS — D50.9 IRON DEFICIENCY ANEMIA, UNSPECIFIED: ICD-10-CM

## 2020-11-19 DIAGNOSIS — R93.3 ABNORMAL FINDINGS ON DIAGNOSTIC IMAGING OF OTHER PARTS OF DI: ICD-10-CM

## 2020-11-19 DIAGNOSIS — K21.9 GASTRO-ESOPHAGEAL REFLUX DISEASE WITHOUT ESOPHAGITIS: ICD-10-CM

## 2020-11-19 DIAGNOSIS — Z86.010 PERSONAL HISTORY OF COLONIC POLYPS: ICD-10-CM

## 2020-11-19 DIAGNOSIS — K31.89 OTHER DISEASES OF STOMACH AND DUODENUM: ICD-10-CM

## 2020-11-19 DIAGNOSIS — D12.2 BENIGN NEOPLASM OF ASCENDING COLON: ICD-10-CM

## 2020-11-19 DIAGNOSIS — D12.5 BENIGN NEOPLASM OF SIGMOID COLON: ICD-10-CM

## 2020-11-19 PROCEDURE — 99214 OFFICE O/P EST MOD 30 MIN: CPT | Mod: 95 | Performed by: NURSE PRACTITIONER

## 2020-11-19 NOTE — SERVICEHPINOTES
I had the pleasure of seeing Mr. Costello via telemedicine. As you know, he is a very pleasant 80-year-old male patient of Dr. Girard with a history of GERD, diarrhea and colon polyps. He was last seen via telemedicine 4/16/2020 and was doing well from a GI standpoint. He presents today for follow-up anticipation of scheduling surveillance colonoscopy.His main complaint is severe back pain secondary to a fracture which prompted ER visit and was given pain medication (hydrocodone) but does not feel that it is providing him much benefit. With regards to his GERD, he is well controlled with pantoprazole 40mg once daily. He denies upper GI complaints at today's office visit. He denies dysphagia, odynophagia, heartburn, reflux, nausea, vomiting, abdominal pain, bloating or distention.He reports a BM 2x per week. He states that he does not feel constipated or feel an urge to go and cannot. He reports two isolated episodes of BRBPR with a scant amount blood on the TP. Appetite is intact and weight stable. He denies blood in his stool, melena, unexplained weight loss, diarrhea, constipation, rectal pain/itching/burning, rectal protrusions, fecal leaking or incontinence. DATA REVIEWED:BREGD 12/27/2019 as below bx (-) HP and celiacBRCN 12/27/2019 as below bx TA x3BRCT A/P 1/3/2020 Colitis (but scoped in 12/27/2019 and CN was nl... over read stent in kidney o/w nl

## 2020-11-25 ENCOUNTER — OFFICE VISIT (OUTPATIENT)
Dept: FAMILY MEDICINE CLINIC | Facility: CLINIC | Age: 80
End: 2020-11-25

## 2020-11-25 VITALS
HEIGHT: 67 IN | BODY MASS INDEX: 21.97 KG/M2 | HEART RATE: 70 BPM | WEIGHT: 140 LBS | DIASTOLIC BLOOD PRESSURE: 50 MMHG | RESPIRATION RATE: 16 BRPM | SYSTOLIC BLOOD PRESSURE: 110 MMHG | TEMPERATURE: 97.3 F | OXYGEN SATURATION: 97 %

## 2020-11-25 DIAGNOSIS — E78.49 OTHER HYPERLIPIDEMIA: ICD-10-CM

## 2020-11-25 DIAGNOSIS — G89.29 CHRONIC MIDLINE LOW BACK PAIN, UNSPECIFIED WHETHER SCIATICA PRESENT: ICD-10-CM

## 2020-11-25 DIAGNOSIS — Z91.14 NONCOMPLIANCE WITH MEDICATIONS: ICD-10-CM

## 2020-11-25 DIAGNOSIS — I10 BENIGN ESSENTIAL HTN: ICD-10-CM

## 2020-11-25 DIAGNOSIS — D63.8 ANEMIA OF CHRONIC DISEASE: ICD-10-CM

## 2020-11-25 DIAGNOSIS — Z72.0 TOBACCO USE: ICD-10-CM

## 2020-11-25 DIAGNOSIS — I73.9 PAD (PERIPHERAL ARTERY DISEASE) (HCC): ICD-10-CM

## 2020-11-25 DIAGNOSIS — S22.070D COMPRESSION FRACTURE OF T10 VERTEBRA WITH ROUTINE HEALING, SUBSEQUENT ENCOUNTER: ICD-10-CM

## 2020-11-25 DIAGNOSIS — M54.50 CHRONIC MIDLINE LOW BACK PAIN, UNSPECIFIED WHETHER SCIATICA PRESENT: ICD-10-CM

## 2020-11-25 DIAGNOSIS — N18.30 STAGE 3 CHRONIC KIDNEY DISEASE, UNSPECIFIED WHETHER STAGE 3A OR 3B CKD (HCC): ICD-10-CM

## 2020-11-25 DIAGNOSIS — K59.00 CONSTIPATION, UNSPECIFIED CONSTIPATION TYPE: ICD-10-CM

## 2020-11-25 DIAGNOSIS — Z91.119 NONCOMPLIANCE OF PATIENT WITH DIETARY REGIMEN: Primary | ICD-10-CM

## 2020-11-25 DIAGNOSIS — R91.1 PULMONARY NODULE: ICD-10-CM

## 2020-11-25 DIAGNOSIS — E11.65 UNCONTROLLED TYPE 2 DIABETES MELLITUS WITH HYPERGLYCEMIA (HCC): ICD-10-CM

## 2020-11-25 PROCEDURE — 99213 OFFICE O/P EST LOW 20 MIN: CPT | Performed by: PHYSICIAN ASSISTANT

## 2020-11-25 NOTE — PROGRESS NOTES
"Subjective   Courtney Medrano is a 80 y.o. male.     History of Present Illness   Courtney Medrano 80 y.o. male /50 (BP Location: Left arm, Patient Position: Sitting, Cuff Size: Adult)   Pulse 70   Temp 97.3 °F (36.3 °C)   Resp 16   Ht 170.2 cm (67.01\")   Wt 63.5 kg (140 lb)   SpO2 97%   BMI 21.92 kg/m²  who presents today for back pain  he has a history of   Patient Active Problem List   Diagnosis   • Anxiety disorder   • Recurrent major depressive disorder, in full remission (CMS/HCC)   • Constipation   • Diabetes type 2, uncontrolled (CMS/HCC)   • GERD (gastroesophageal reflux disease)   • Hyperlipidemia   • Low back pain   • Prostatitis   • Pulmonary nodule   • Diabetic peripheral neuropathy (CMS/HCC)   • Chronic fatigue   • Vitamin D deficiency   • Noncompliance of patient with dietary regimen   • Uncontrolled type 2 diabetes mellitus (CMS/HCC)   • Constipation   • Leg pain   • Benign essential HTN   • Noncompliance with medications   • Perirectal abscess   • Anxiety   • CKD (chronic kidney disease) stage 3, GFR 30-59 ml/min   • Anemia of chronic disease   • Low hemoglobin   • PAD (peripheral artery disease) (CMS/HCC)   • Polyp of colon   • Pyelonephritis   • Hematuria   • Tobacco use   • Type 2 diabetes mellitus with diabetic chronic kidney disease (CMS/HCC)   • Contusion of rib on right side   • Weight loss   • Generalized weakness   • Hyperkalemia   • TIAGO (acute kidney injury) (CMS/HCC)   • Current chronic use of systemic steroids   • Failure to thrive in adult   • Chronic anticoagulation   .    Patient has been to the ER 3 times for back pain since January.  Last visit was 11/11/2020 had CT of the abdomen and pelvis without contrast and noted constipation, interval progression of the T12 compression deformity and new compression fracture along L1 vertebral body.  Also noted left lower lobe atelectasis with filling defect that may represent mucous plugging versus other and follow-up with chest " CT recommended--patient is under hospice care and has been seen for pulmonary disease with Dr. Mike.  Patient inquiring about pain management but is already under the care of hospitalist and is on morphine.. Patient is poor risk for surgery and also for procedures-he does have a back brace and has not used it.  We will have his son with him today asked hospitalist if they want me to refer him to pain management for further evaluation I can do this but need permission from hospice first-  He is under Hospice---he is on Morphine  Has DVT left leg and seeing vascular--on Eliquis  Patient still looks cachectic and is still losing weight.  Patient declines having feeding tube or other interventions.  His blood sugars run in the 4 and 500s but he is taking his insulin and followed by endocrinology.  I do want a note his blood pressure is in range today and uses on medication per nephrology for hypertension.  His blood pressure was very high when he was at the emergency room.  The following portions of the patient's history were reviewed and updated as appropriate: allergies, current medications, past family history, past medical history, past social history, past surgical history and problem list.    Review of Systems   Constitutional: Positive for activity change, appetite change and fatigue. Negative for unexpected weight change.   HENT: Positive for facial swelling. Negative for nosebleeds and trouble swallowing.    Eyes: Negative for pain and visual disturbance.   Respiratory: Negative for chest tightness, shortness of breath and wheezing.    Cardiovascular: Negative for chest pain and palpitations.   Gastrointestinal: Negative for abdominal pain and blood in stool.   Endocrine: Negative.    Genitourinary: Negative for difficulty urinating and hematuria.   Musculoskeletal: Positive for back pain. Negative for joint swelling.   Skin: Negative for color change and rash.   Allergic/Immunologic: Negative.     Neurological: Negative for syncope and speech difficulty.   Hematological: Negative for adenopathy.   Psychiatric/Behavioral: Negative for agitation and confusion.   All other systems reviewed and are negative.      Objective   Physical Exam  Vitals signs and nursing note reviewed.   Constitutional:       General: He is not in acute distress.     Appearance: He is well-developed. He is ill-appearing. He is not diaphoretic.   HENT:      Head: Normocephalic.      Nose: Nose normal.   Eyes:      General:         Right eye: No discharge.         Left eye: No discharge.      Conjunctiva/sclera: Conjunctivae normal.      Pupils: Pupils are equal, round, and reactive to light.   Neck:      Musculoskeletal: Normal range of motion and neck supple.   Cardiovascular:      Rate and Rhythm: Normal rate and regular rhythm.      Heart sounds: Normal heart sounds. No murmur.      Comments: Decreased pedal pulses  Pulmonary:      Effort: Pulmonary effort is normal.      Breath sounds: Normal breath sounds.   Abdominal:      Palpations: Abdomen is soft.      Tenderness: There is no abdominal tenderness. There is no guarding.   Musculoskeletal:      Comments: weakness   Skin:     General: Skin is warm and dry.      Coloration: Skin is not jaundiced.      Findings: Bruising present.   Neurological:      Mental Status: He is alert and oriented to person, place, and time. Mental status is at baseline.      Gait: Gait abnormal.   Psychiatric:         Mood and Affect: Affect is not inappropriate.         Thought Content: Thought content normal.         Judgment: Judgment normal.      Comments: Flat          Assessment/Plan   Diagnoses and all orders for this visit:    1. Noncompliance of patient with dietary regimen (Primary)    2. Noncompliance with medications    3. Anemia of chronic disease    4. PAD (peripheral artery disease) (CMS/[facility])    5. Stage 3 chronic kidney disease, unspecified whether stage 3a or 3b CKD    6. Benign  essential HTN    7. Uncontrolled type 2 diabetes mellitus with hyperglycemia (CMS/[facility])    8. Other hyperlipidemia    9. Constipation, unspecified constipation type    10. Pulmonary nodule    11. Tobacco use    12. Chronic midline low back pain, unspecified whether sciatica present    13. Compression fracture of T10 vertebra with routine healing, subsequent encounter      Stop smoking  Concern about back pain and is taking Morphine from Hospice. Asking today about epidural??  Need to talk to Hospice--I can refer if they advise this.  Concern that he is on Eliquis for DVT----also DMII and on insulin;  Has CKD and sees nephrologist.  He has many risk factors for any surgical type procedure and do not advise he pursue this.  I do not want him to have epidurals or procedures  Try back brace

## 2021-01-26 ENCOUNTER — TELEPHONE (OUTPATIENT)
Dept: ENDOCRINOLOGY | Age: 81
End: 2021-01-26

## 2021-02-26 ENCOUNTER — TELEPHONE (OUTPATIENT)
Dept: FAMILY MEDICINE CLINIC | Facility: CLINIC | Age: 81
End: 2021-02-26

## 2021-02-26 NOTE — TELEPHONE ENCOUNTER
CHANG GORE CALLING IN TO TELL DR DUVALL  THAT HER  NO LONGER NEEDS THE REFERRAL WITH DR PALMA FOR THE COLONSCOPY. CHANG IS REQUESTING A CALL BACK PLEASE . THANK YOU!

## 2021-03-02 DIAGNOSIS — Z23 IMMUNIZATION DUE: ICD-10-CM

## 2023-07-07 NOTE — DISCHARGE SUMMARY
Patient Name: Courtney Medrano  : 1940  MRN: [id number]    Date of Admission: 2020  Date of Discharge:  2020  Primary Care Physician: Shantell Alvarez PA-C      Chief Complaint:   Weakness - Generalized      Discharge Diagnoses     Active Hospital Problems    Diagnosis  POA   • **TIAGO (acute kidney injury) (CMS/[facility]) [N17.9]  Yes   • Current chronic use of systemic steroids [Z79.52]  Not Applicable   • Failure to thrive in adult [R62.7]  Yes   • Chronic anticoagulation [Z79.01]  Not Applicable   • Generalized weakness [R53.1]  Yes   • Hyperkalemia [E87.5]  Yes   • Type 2 diabetes mellitus with diabetic chronic kidney disease (CMS/[facility]) [E11.22]  Yes   • Tobacco use [Z72.0]  Yes   • CKD (chronic kidney disease) stage 3, GFR 30-59 ml/min (CMS/[facility]) [N18.3]  Yes   • Anemia of chronic disease [D63.8]  Yes   • Benign essential HTN [I10]  Yes   • Constipation [K59.00]  Yes   • Vitamin D deficiency [E55.9]  Yes   • Diabetic peripheral neuropathy (CMS/[facility]) [E11.42]  Yes   • Chronic fatigue [R53.82]  Yes   • Hyperlipidemia [E78.5]  Yes      Resolved Hospital Problems   No resolved problems to display.        Hospital Course     Mr. Medrano is a 80 y.o. male initially presented primarily with generalized weakness and failure to thrive.  This is been going on for quite some time as he recently spent 3 weeks in a skilled nursing facility.  His primary care doctor has tried him on Megace and Decadron to increase his appetite and hopefully his strength.  He presented with severe hypertension, acute kidney injury and mild hyperkalemia.  He was seen by nephrology and his creatinine has improved from 2.7 on admission to 1.39 today.  He is continue to work with physical therapy here and has shown some improvement.  His wife states he does not do exercises on his own at home.  Stressed the importance of this.  We attempted to get him into a skilled nursing facility again but his insurance denied this despite our  recommendations.  Family was agreeable to going home with home health and continued outpatient physical therapy.  Because of hyperglycemia and reported muscle weakness I did decrease his Decadron from 2 mg to 1 mg and encouraged him to follow-up with PCP about weaning this off.  Wife states he has gained 10 or 20 pound since being on this.  Did not want to discontinue abruptly as he has been on this for several weeks.  Blood pressure still slightly elevated but he seems stable for discharge.  Will discharge home with home health and close outpatient follow-up with PCP.      Day of Discharge     Doing well.  No new issues.  Eating well and doing PT.    Physical Exam:  Temp:  [97.2 °F (36.2 °C)-98.3 °F (36.8 °C)] 98.1 °F (36.7 °C)  Heart Rate:  [68-81] 78  Resp:  [18] 18  BP: (138-169)/(40-85) 169/85  Body mass index is 17.93 kg/m².  Physical Exam   Constitutional: He is cooperative. He has a sickly appearance. No distress.   Neck: No JVD present. No tracheal deviation present.   Cardiovascular: Normal rate and regular rhythm.   No murmur heard.  Pulmonary/Chest: Effort normal and breath sounds normal. No respiratory distress.   Abdominal: Soft. Normal appearance and bowel sounds are normal. He exhibits no distension. There is no tenderness.   Musculoskeletal: He exhibits no edema.   Neurological: He is alert.   Skin: Skin is warm and dry.   Psychiatric: He has a normal mood and affect. His behavior is normal. Cognition and memory are impaired.   Nursing note and vitals reviewed.      Consultants     Consult Orders (all) (From admission, onward)     Start     Ordered    09/06/20 0811  Inpatient Nephrology Consult  Once     Specialty:  Nephrology  Provider:  Osmin Fan MD    09/06/20 0810              Procedures     Imaging Results (All)     Procedure Component Value Units Date/Time    US Renal Bilateral [[id number]] Collected:  09/08/20 1745     Updated:  09/08/20 1755    Narrative:       US RENAL  BILATERAL-     INDICATIONS: Acute renal failure     TECHNIQUE: ULTRASOUND OF THE KIDNEYS AND URINARY BLADDER.     COMPARISON: CT from 01/11/2020     FINDINGS:     The right kidney measures 11.0 centimeters, the left kidney measures  11.2 centimeters.     No renal lesion is identified. Mild left upper pole caliectasis. No  ziyad hydronephrosis. Echogenic nonobstructive stones are apparent in  each kidney. Trace right perinephric fluid.     The prostate is enlarged, 5.6 cm, PSA correlation recommended as  indicated. Prominent prostatic impression on the urinary bladder appears  grossly similar to the prior CT exam The urinary bladder otherwise  appears unremarkable. Bilateral ureteral jets were observed.       Impression:       Mild left upper pole caliectasis. No ziyad hydronephrosis.  Nonobstructive stones are apparent in each kidney. Enlarged prostate  with prominent prostatic impression on the urinary bladder.     This report was finalized on 9/8/2020 5:52 PM by Dr. Jorge L Alex M.D.       XR Abdomen KUB [[id number]] Collected:  09/06/20 1408     Updated:  09/06/20 1413    Narrative:       ONE VIEW ABDOMEN     HISTORY: Constipation. Abdominal pain.     FINDINGS: There is a moderate amount of stool in the right colon and to  a lesser extent scattered in the remainder the colon. There is also  moderate amount of bowel gas scattered in nondilated small bowel. The  findings are consistent with history of constipation. There is no fecal  impaction.     This report was finalized on 9/6/2020 2:10 PM by Dr. Ismael Perez M.D.       XR Chest 1 View [[id number]] Collected:  09/06/20 0541     Updated:  09/06/20 0546    Narrative:       PORTABLE CHEST RADIOGRAPH     HISTORY: Cough.     COMPARISON: 02/26/2020     FINDINGS:  Cardiac silhouette is stable. Patient does appear to have a right  basilar infiltrate. No pneumothorax or pleural effusion is seen.       Impression:       Right basilar infiltrate, concerning for  pneumonia.     This report was finalized on 9/6/2020 5:43 AM by Dr. Concetta Cardenas M.D.       CT Head Without Contrast [[id number]] Collected:  09/05/20 2239     Updated:  09/05/20 2245    Narrative:       CT HEAD WITHOUT CONTRAST     HISTORY: Generalized weakness     COMPARISON: None available.     TECHNIQUE: Axial CT imaging was obtained through the brain. No IV  contrast was administered.     FINDINGS:  No acute intracranial hemorrhage is seen. There is diffuse atrophy.  There is extensive periventricular and deep white matter  microangiopathic change. 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  There is also an old right basal ganglia infarct. Tiny old infarct is  noted within the left cerebellar hemisphere. The paranasal sinuses and  mastoid air cells are essentially clear.       Impression:       No acute findings.     Radiation dose reduction techniques were utilized, including automated  exposure control and exposure modulation based on body size.     This report was finalized on 9/5/2020 10:42 PM by Dr. Concetta Cardenas M.D.           Results for orders placed during the hospital encounter of 06/02/17   Duplex Venous Lower Extremity - Bilateral CAR    Narrative · There was deep venous valvular incompetence noted in the left popliteal.  · All other veins appeared normal bilaterally.           Pertinent Labs     Results from last 7 days   Lab Units 09/08/20  0540 09/07/20  0611 09/06/20  0704 09/05/20  2039   WBC 10*3/mm3 6.44 6.91 7.49 8.25   HEMOGLOBIN g/dL 10.2* 9.8* 9.6* 10.6*   PLATELETS 10*3/mm3 199 210 220 264     Results from last 7 days   Lab Units 09/11/20  0738 09/10/20  0512 09/09/20  0536 09/08/20  0540   SODIUM mmol/L 136 137 139 136   POTASSIUM mmol/L 3.6 4.0 3.6 3.7   CHLORIDE mmol/L 110* 109* 109* 107   CO2 mmol/L 16.1* 17.8* 19.1* 19.3*   BUN mg/dL 40* 40* 27* 32*   CREATININE mg/dL 1.39* 1.74* 1.67* 2.01*   GLUCOSE mg/dL 177* 302* 114* 189*   Estimated Creatinine Clearance: 32.2  mL/min (A) (by C-G formula based on SCr of 1.39 mg/dL (H)).  Results from last 7 days   Lab Units 09/11/20  0738 09/10/20  0512 09/09/20  0536 09/05/20 2039   ALBUMIN g/dL 3.80 3.80 3.90 4.30   BILIRUBIN mg/dL  --   --   --  0.2   ALK PHOS U/L  --   --   --  63   AST (SGOT) U/L  --   --   --  12   ALT (SGPT) U/L  --   --   --  7     Results from last 7 days   Lab Units 09/11/20  0738 09/10/20  0512 09/09/20  0536 09/08/20  0540 09/07/20  0611 09/05/20 2039   CALCIUM mg/dL 8.5* 8.4* 8.7 8.0* 7.8*   < > 9.5   ALBUMIN g/dL 3.80 3.80 3.90  --   --   --  4.30   MAGNESIUM mg/dL  --   --   --   --  2.0  --   --    PHOSPHORUS mg/dL 3.6 4.2 3.3  --   --   --   --     < > = values in this interval not displayed.       Results from last 7 days   Lab Units 09/05/20 2039   TROPONIN T ng/mL 0.014     Results from last 7 days   Lab Units 09/08/20  1535   SODIUM UR mmol/L 101   CREATININE UR mg/dL 24.1   CHLORIDE UR mmol/L 62   EOSINOPHIL SMEAR % EOS/100 Cells 0         Invalid input(s): LDLCALC      Results from last 7 days   Lab Units 09/09/20  1826   COVID19  Not Detected       Test Results Pending at Discharge       Discharge Details        Discharge Medications      Changes to Medications      Instructions Start Date   amLODIPine 10 MG tablet  Commonly known as:  NORVASC  What changed:    · medication strength  · how much to take  · when to take this   10 mg, Oral, Every 24 Hours Scheduled   Start Date:  September 12, 2020     dexamethasone 1 MG tablet  Commonly known as:  DECADRON  What changed:    · medication strength  · how much to take  · when to take this   1 mg, Oral, Daily With Breakfast   Start Date:  September 12, 2020        Continue These Medications      Instructions Start Date   Alcohol Swabs pads   Swab finger before obtaining glucose level for diabetes. E11.42      apixaban 2.5 MG tablet tablet  Commonly known as:  ELIQUIS   2.5 mg, Oral, Every 12 Hours Scheduled      ARIPiprazole 2 MG tablet  Commonly  known as:  ABILIFY   2 mg, Oral, Nightly, For depression      CENTRUM ADULTS PO   Oral      Droplet Pen Needles 31G X 8 MM misc  Generic drug:  Insulin Pen Needle   No dose, route, or frequency recorded.      glucose blood test strip  Commonly known as:  True Metrix Blood Glucose Test   Use to test BG 3 times daily. DX Code: E11.42      hydrALAZINE 50 MG tablet  Commonly known as:  APRESOLINE   50 mg, Oral, 3 Times Daily      Insulin Glargine 100 UNIT/ML injection pen  Commonly known as:  LANTUS SOLOSTAR   10 Units, Subcutaneous, Nightly      pantoprazole 40 MG EC tablet  Commonly known as:  PROTONIX   40 mg, Oral, Daily      sodium bicarbonate 650 MG tablet   650 mg, Oral, 3 Times Daily      tamsulosin 0.4 MG capsule 24 hr capsule  Commonly known as:  FLOMAX   0.4 mg, Oral, Daily, For urine flow (watch dizziness)      Vitamin D3 75 MCG (3000 UT) tablet   1,000 Units, Oral, Daily         Stop These Medications    glimepiride 4 MG tablet  Commonly known as:  AMARYL     hydroCHLOROthiazide 25 MG tablet  Commonly known as:  HYDRODIURIL     lisinopril 40 MG tablet  Commonly known as:  PRINIVIL,ZESTRIL     megestrol 40 MG/ML suspension  Commonly known as:  Megace Oral     metFORMIN 1000 MG (OSM) 24 hr tablet  Commonly known as:  FORTAMET     omeprazole 20 MG capsule  Commonly known as:  PrilOSEC     ondansetron ODT 4 MG disintegrating tablet  Commonly known as:  ZOFRAN-ODT     sulfamethoxazole-trimethoprim 800-160 MG per tablet  Commonly known as:  BACTRIM DS,SEPTRA DS        ASK your doctor about these medications      Instructions Start Date   escitalopram 20 MG tablet  Commonly known as:  LEXAPRO   20 mg, Oral, Daily, For anxiety and depression             Allergies   Allergen Reactions   • Flexeril [Cyclobenzaprine] Hives         Discharge Disposition:  Home-Health Care Svc    Discharge Diet:  Diet Order   Procedures   • Diet Regular; Cardiac, Consistent Carbohydrate       Discharge Activity:   Activity Instructions      Activity as Tolerated            CODE STATUS:    Code Status and Medical Interventions:   Ordered at: 09/05/20 2337     Code Status:    CPR     Medical Interventions (Level of Support Prior to Arrest):    Full       Future Appointments   Date Time Provider Department Center   9/11/2020  1:00 PM [facility] None   9/15/2020  2:00 PM Shantell Alvarez PA-C [id number] None   9/17/2020  4:30 PM JOHN CT 3 BH JOHN CT JOHN   9/25/2020  2:45 PM Asia Hilton APRN [facility] None     Additional Instructions for the Follow-ups that You Need to Schedule     Ambulatory Referral to Home Health   As directed      Face to Face Visit Date:  9/11/2020    Follow-up provider for Plan of Care?:  I treated the patient in an acute care facility and will not continue treatment after discharge.    Follow-up provider:  SHANTELL ALVAREZ [[id number]]    Reason/Clinical Findings:  renal failure, deconditioning    Describe mobility limitations that make leaving home difficult:  Requires assistance of another to leave the home    Nursing/Therapeutic Services Requested:  Physical Therapy    PT orders:  Therapeutic exercise Strengthening Home safety assessment    Frequency:  1 Week 1         Discharge Follow-up with PCP   As directed       Currently Documented PCP:    Shantell Alvarez PA-C    PCP Phone Number:    [phone]     Follow Up Details:  1 to 2 weeks (or sooner if problems)           Follow-up Information     Shantell Alvarez PA-C .    Specialty:  Family Medicine  Why:  1 to 2 weeks (or sooner if problems)  Contact information:  [address]  [place] [address]  [phone]             [facility] HOME CARE REFERRAL [place] [facility] [place] .    Specialty:  Home Health Services  Contact information:  [address]  [place] [facility] [place] [address]                 Additional Instructions for the Follow-ups that You Need to Schedule     Ambulatory Referral to Home Health   As  directed      Face to Face Visit Date:  9/11/2020    Follow-up provider for Plan of Care?:  I treated the patient in an acute care facility and will not continue treatment after discharge.    Follow-up provider:  SHANTELL ALVAREZ [[id number]]    Reason/Clinical Findings:  renal failure, deconditioning    Describe mobility limitations that make leaving home difficult:  Requires assistance of another to leave the home    Nursing/Therapeutic Services Requested:  Physical Therapy    PT orders:  Therapeutic exercise Strengthening Home safety assessment    Frequency:  1 Week 1         Discharge Follow-up with PCP   As directed       Currently Documented PCP:    Shantell Alvarez, PA-C    PCP Phone Number:    [phone]     Follow Up Details:  1 to 2 weeks (or sooner if problems)           Time Spent on Discharge:  Greater than 30 minutes      Ramakrishna Orellana MD  [facility]  09/11/20  11:50 AM               This document is complete and the patient is ready for discharge.

## (undated) DEVICE — LOU CYSTO: Brand: MEDLINE INDUSTRIES, INC.

## (undated) DEVICE — IRRIGATOR TOOMEY 70CC

## (undated) DEVICE — NITINOL WIRE WITH HYDROPHILIC TIP: Brand: SENSOR

## (undated) DEVICE — CONTAINER,SPECIMEN,OR STERILE,4OZ: Brand: MEDLINE

## (undated) DEVICE — TIDISHIELD UROLOGY DRAIN BAGS FROSTY VINYL STERILE FITS SIEMENS UROSKOP ACCESS 20 PER CASE: Brand: TIDISHIELD

## (undated) DEVICE — GLV SURG BIOGEL LTX PF 7

## (undated) DEVICE — NITINOL STONE RETRIEVAL BASKET: Brand: ZERO TIP